# Patient Record
Sex: MALE | Race: WHITE
[De-identification: names, ages, dates, MRNs, and addresses within clinical notes are randomized per-mention and may not be internally consistent; named-entity substitution may affect disease eponyms.]

---

## 2017-12-21 NOTE — RAD
INDICATION: Cough     



COMPARISON: November 10, 2017

 

TECHNIQUE: PA and lateral dual-energy views were obtained.



FINDINGS: 



Bones/Soft Tissues: There are no acute bony findings. There is sternotomy



Cardiomediastinal: The cardiomediastinal silhouette is normal. 



Lungs: There are no infiltrates. There is mild right basilar atelectasis, unchanged



Pleura: There are no pleural effusions. 



Other: There is elevation right hemidiaphragm, unchanged



IMPRESSION:  NO ACTIVE DISEASE. CHRONIC ELEVATION OF THE RIGHT HEMIDIAPHRAGM

## 2017-12-21 NOTE — UC
Lauren BROCK Alfonso, scribed for Vitaliy Aviles MD on 12/21/17 at 1915 .





 General HPI





- HPI Summary


HPI Summary: 


 This patient is an 84 year old M presenting to [facility] referred by Dr. Montanez (

internist) accompanied by daughter with a chief complaint of productive cough 

since a few days ago. He will see Dr. Montanez tomorrow. The patient rates the 

pain 1/10 in severity. Symptoms aggravated and alleviated by nothing. Patient 

reports mild SOB. Patient denies fever, and CP.





- History of Current Complaint


Chief Complaint: UCRespiratory


Stated Complaint: COUGH


Time Seen by Provider: 12/21/17 19:06


Hx Obtained From: Patient


Onset/Duration: Gradual Onset, Lasting Days, Still Present


Timing: Constant


Current Severity: Mild


Pain Intensity: 1 - /10


Aggravating: nothing


Alleviating: nothing


Associated Signs & Symptoms: Positive: Other - mild SOB. Patient denies fever, 

and CP.





- Allergy/Home Medications


Allergies/Adverse Reactions: 


 Allergies











Allergy/AdvReac Type Severity Reaction Status Date / Time


 


No Known Allergies Allergy   Verified 05/07/16 15:28














PMH/Surg Hx/FS Hx/Imm Hx


Previously Healthy: No


Cardiovascular History: Other


Other Cardiovascular History: valvular disease





- Surgical History


Surgical History: Yes


Surgery Procedure, Year, and Place: FEB.6 2014 - AROTIC VALVE REPLACEMENT ( 

DAUGHTER WILL BRING AT TIME OF APT).  DIONNE KNEE REPLACEMENT





- Family History


Known Family History: Positive: Other - REVIEWED & NONCONTRIBUTORY





- Social History


Alcohol Use: None


Substance Use Type: None


Smoking Status (MU): Former Smoker


Type: Pipe


When Did the Patient Quit Smoking/Using Tobacco: 1985





Review of Systems


Constitutional: Other - Negative fever


Respiratory: Shortness Of Breath, Cough


Cardiovascular: Other - Negative CP


All Other Systems Reviewed And Are Negative: Yes





Physical Exam


Triage Information Reviewed: Yes


Vital Signs: 


 Initial Vital Signs











Temp  97.0 F   12/21/17 18:25


 


Pulse  70   12/21/17 18:25


 


Resp  20   12/21/17 18:25


 


BP  136/73   12/21/17 18:25


 


Pulse Ox  96   12/21/17 18:25











Vital Signs Reviewed: Yes





- Additional Comments





VITAL SIGNS: Reviewed.


GENERAL:  Patient is an elderly and nourished male who is lying comfortable in 

the stretcher.  Patient is not in any acute respiratory distress.


HEAD AND FACE: Normocephalic


EYES: PERRLA, EOMI x 2.


EARS: Hearing grossly intact.


MOUTH: Oropharynx within normal limits.


NECK: Supple, trachea is midline, no adenopathy, no JVD, no carotid bruit.


CHEST: Symmetric, no tenderness at palpation


LUNGS: Bilateral crackles. No wheezing.


CVS: Regular rate and rhythm, S1 and S2 present, no murmurs or gallops 

appreciated.


ABDOMEN: Soft, non-tender. Bowel sounds are normal. No abdominal abnormal 

pulsations.


EXTREMITIES: Full ROM in all major joints, no edema, no cyanosis or clubbing.


NEURO: Alert and oriented x 3. No acute neurological deficits. Speech is normal 

and follows commands.


SKIN: Dry and warm








Diagnostics





- Laboratory


Diagnostic Studies Completed/Ordered: CXR reveals, per radiologist, NO ACTIVE 

DISEASE. CHRONIC ELEVATION OF THE RIGHT HEMIDIAPHRAGM. [facility] physician has 

reviewed this radiology report.





Course/Dx





- Course


Course Of Treatment: This patient is an 84 year old M presenting to [facility] 

referred by Dr. Montanez (internist) accompanied by daughter with a chief 

complaint of productive cough since a few days ago. He will see Dr. Montanez 

tomorrow. The patient rates the pain 1/10 in severity. Symptoms aggravated and 

alleviated by nothing. Patient reports mild SOB. Patient denies fever, and CP. 

CXR reveals, per radiologist, NO ACTIVE DISEASE. CHRONIC ELEVATION OF THE RIGHT 

HEMIDIAPHRAGM. [facility] physician has reviewed this radiology report. Influenza A 

and B negative. Patient will be discharged with follow up from PCP. The patient 

is agreeable with this plan. The patient is hemodynamically stable, alert and 

oriented x3.





- Differential Dx - Multi-Symptom


Differential Diagnoses: Other - Pneumonia, Bronchitis, CHF,


Provider Diagnoses: Cough





Discharge





- Discharge Plan


Condition: Stable


Disposition: HOME


Patient Education Materials:  Acute Cough (ED)


Referrals: 


Vitaliy Montanez MD [Primary Care Provider] - 1 Day


Additional Instructions: 


RETURN TO THE EMERGENCY DEPARTMENT OR CONVENIENT CARE FOR CHANGING OR WORSENING 

SYMPTOMS.





The documentation as recorded by the Lauren mcdaniels Alfonso accurately reflects 

the service I personally performed and the decisions made by Stefan macedo Walter, MD.

## 2018-07-18 NOTE — ED
Psychiatric Complaint





- HPI Summary


HPI Summary: 





Pt with dementia who resides on dementia unit at [facility] presents w/ "

aggressive behavior". No other reports provided. Pt is poor historian.





- History Of Current Complaint


Chief Complaint: EDGeneral


Time Seen by Provider: 07/18/18 09:16


Hx Obtained From: Patient





- Allergies/Home Medications


Allergies/Adverse Reactions: 


 Allergies











Allergy/AdvReac Type Severity Reaction Status Date / Time


 


No Known Allergies Allergy   Verified 07/18/18 09:02











Home Medications: 


 Home Medications





Acetaminophen TAB* [Tylenol TAB*] 650 mg PO Q4H PRN 07/18/18 [History Confirmed 

07/18/18]


Aspirin EC TAB* [Ecotrin EC Low Dose 81 MG*] 81 mg PO DAILY 07/18/18 [History 

Confirmed 07/18/18]


Calcium Carbonate TAB* 1,250 mg PO BID 07/18/18 [History Confirmed 07/18/18]


Cholecalciferol TAB* [Vitamin D TAB*] 1,000 unit PO DAILY 07/18/18 [History 

Confirmed 07/18/18]


Donepezil TAB* [Aricept 5 MG TAB*] 10 mg PO BEDTIME 07/18/18 [History Confirmed 

07/18/18]


Enalapril TAB* [Vasotec TAB*] 10 mg PO DAILY 07/18/18 [History Confirmed 07/18/ 18]


Loperamide LIQ* [Imodium LIQ*] 2 mg PO DAILY PRN 07/18/18 [History Confirmed 07/ 18/18]


Metoprolol Tartrate TAB* [Lopressor TAB*] 50 mg PO BID 07/18/18 [History 

Confirmed 07/18/18]


Omega-3 Fatty Acids (Nf) [Fish Oil (NF)] 1,000 mg PO DAILY 07/18/18 [History 

Confirmed 07/18/18]


Simvastatin TAB(NF) [Zocor(NF)] 20 mg PO DAILY 07/18/18 [History Confirmed 07/18 /18]


Umeclidin/Vilant 62.5 MDI(NF) [ANORO 62.5/25 Ellipta DEVICE (NF)] 1 inh INH 

DAILY 07/18/18 [History Confirmed 07/18/18]


amLODIPine TAB* [Norvasc 5 mg TAB*] 5 mg PO DAILY 07/18/18 [History Confirmed 07 /18/18]











PMH/Surg Hx/FS Hx/Imm Hx


Previously Healthy: No - severe dementia


Endocrine/Hematology History: 


   Denies: Hx Diabetes


Cardiovascular History: Reports: Hx Hypertension - CONTROLLED WITH MEDS, Hx 

Peripheral Vascular Disease - RIGHT ILLIAC, Hx Rheumatic Fever - AS A TEEN, Hx 

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


   Denies: Hx Congestive Heart Failure, Hx Pacemaker/ICD, Other Cardiovascular 

Problems/Disorders


Respiratory History: 


   Denies: Hx Asthma, Hx Chronic Obstructive Pulmonary Disease (COPD), Other 

Respiratory Problems/Disorders


GI History: 


   Denies: Other GI Disorders


 History: 


   Denies: Hx Renal Disease


Musculoskeletal History: Reports: Hx Arthritis - HANDS, FEET


   Denies: Other Musculoskeletal History


Sensory History: Reports: Hx Cataracts - DIONNE, Hx Contacts or Glasses - GLASSES, 

Hx Hearing Aid


Opthamlomology History: Reports: Hx Cataracts - DIONNE, Hx Contacts or Glasses - 

GLASSES


Neurological History: Reports: Hx Nerve Disease - NEUROPATHY LEFT SIDE, Other 

Neuro Impairments/Disorders - DEMENTIA


Psychiatric History: 


   Denies: Hx Panic Disorder





- Cancer History


Cancer Type, Location and Year: PROSTATE


Hx Chemotherapy: Yes





- Surgical History


Surgery Procedure, Year, and Place: FEB.6 2014 - AROTIC VALVE REPLACEMENT ( 

DAUGHTER WILL BRING AT TIME OF APT).  DIONNE KNEE REPLACEMENT


Hx Anesthesia Reactions: Yes - AGGRESSIVE WITH VALVE REPLACEMENT


Infectious Disease History: Unable to Obtain/Confirm


Infectious Disease History: 


   Denies: Traveled Outside the [place] in Last 30 Days





- Family History


Known Family History: Positive: Other - level 5 caveat - dementia





- Social History


Occupation: Retired


Lives: At The Nursing Home


Alcohol Use: None


Hx Substance Use: No


Substance Use Type: Reports: None


Hx Tobacco Use: No


Smoking Status (MU): Former Smoker


Type: Pipe





Review of Systems





- ROS Summary


Review of Systems Summary: 





Level 5 caveat - significant dementia


Psychological: Other - aggressive behavior reported by nursing home staff


All Other Systems Reviewed And Are Negative: Yes





Physical Exam


Triage Information Reviewed: Yes


Vital Signs On Initial Exam: 


 Initial Vitals











Temp Pulse Resp BP Pulse Ox


 


 98 F   57   16   145/60   94 


 


 07/18/18 08:59  07/18/18 08:59  07/18/18 08:59  07/18/18 08:59  07/18/18 08:59











Vital Signs Reviewed: Yes


Appearance: Positive: Well-Appearing, No Pain Distress, Well-Nourished


Skin: Positive: Warm, Skin Color Reflects Adequate Perfusion, Dry - no s/sx of 

wounds/skin infection


Head/Face: Positive: Normal Head/Face Inspection - atraumatic


Eyes: Positive: Normal, EOMI, SILVIO, Conjunctiva Clear


ENT: Positive: Normal ENT inspection, Hearing grossly normal, Pharynx normal - 

mucosa moist - atraumatic, TMs normal.  Negative: Nasal congestion, Nasal 

drainage, Trismus, Muffled voice


Dental: Negative: Dental Fracture @


Neck: Positive: Supple, Nontender


Respiratory/Lung Sounds: Positive: Clear to Auscultation, Breath Sounds 

Present.  Negative: Rales, Rhonchi, Wheezes


Cardiovascular: Positive: RRR, S1, S2.  Negative: Leg Edema Left, Leg Edema 

Right


Abdomen Description: Positive: Nontender, No Organomegaly, Soft


Bowel Sounds: Positive: Present


Musculoskeletal: Positive: Strength/ROM Intact - spontaneous and appropriate


Neurological: Positive: Other - significant dementia - does not answer 

questions appropriately.  Negative: Alert, Oriented to Person Place, Time


Psychiatric: Positive: Normal - calm, seated on stretcher - pleasant and mostly 

cooperative w/ exam





Diagnostics





- Vital Signs


 Vital Signs











  Temp Pulse Resp BP Pulse Ox


 


 07/18/18 08:59  98 F  57  16  145/60  94














- Laboratory


Lab Results: 


 Lab Results











  07/18/18 07/18/18 Range/Units





  09:27 09:27 


 


WBC  7.1   (3.5-10.8)  10^3/ul


 


RBC  4.43   (4.00-5.40)  10^6/ul


 


Hgb  13.7 L   (14.0-18.0)  g/dl


 


Hct  40 L   (42-52)  %


 


MCV  91   (80-94)  fL


 


MCH  31   (27-31)  pg


 


MCHC  34   (31-36)  g/dl


 


RDW  15   (10.5-15)  %


 


Plt Count  174   (150-450)  10^3/ul


 


MPV  8.7   (7.4-10.4)  um3


 


Neut % (Auto)  65.4   (38-83)  %


 


Lymph % (Auto)  15.8 L   (25-47)  %


 


Mono % (Auto)  12.3 H   (0-7)  %


 


Eos % (Auto)  5.6   (0-6)  %


 


Baso % (Auto)  0.9   (0-2)  %


 


Absolute Neuts (auto)  4.6   (1.5-7.7)  10^3/ul


 


Absolute Lymphs (auto)  1.1   (1.0-4.8)  10^3/ul


 


Absolute Monos (auto)  0.9 H   (0-0.8)  10^3/ul


 


Absolute Eos (auto)  0.4   (0-0.6)  10^3/ul


 


Absolute Basos (auto)  0.1   (0-0.2)  10^3/ul


 


Absolute Nucleated RBC  0   10^3/ul


 


Nucleated RBC %  0   


 


Sodium   137  (135-145)  mmol/L


 


Potassium   4.1  (3.5-5.0)  mmol/L


 


Chloride   103  (101-111)  mmol/L


 


Carbon Dioxide   30  (22-32)  mmol/L


 


Anion Gap   4  (2-11)  mmol/L


 


BUN   21  (6-24)  mg/dL


 


Creatinine   1.17  (0.67-1.17)  mg/dL


 


Est GFR ( Amer)   71.7  (>60)  


 


Est GFR (Non-Af Amer)   59.2  (>60)  


 


BUN/Creatinine Ratio   17.9  (8-20)  


 


Glucose   107 H  ()  mg/dL


 


Calcium   9.3  (8.6-10.3)  mg/dL


 


Total Bilirubin   0.70  (0.2-1.0)  mg/dL


 


AST   22  (13-39)  U/L


 


ALT   24  (7-52)  U/L


 


Alkaline Phosphatase   68  ()  U/L


 


Total Protein   7.1  (6.4-8.9)  g/dL


 


Albumin   3.5  (3.2-5.2)  g/dL


 


Globulin   3.6  (2-4)  g/dL


 


Albumin/Globulin Ratio   1.0  (1-3)  


 


TSH   1.06  (0.34-5.60)  mcIU/mL


 


Salicylates   < 2.50  (<30)  mg/dL


 


Acetaminophen   < 15  mcg/mL


 


Serum Alcohol   < 10  (<10)  mg/dL











Result Diagrams: 


 07/18/18 09:27





 07/18/18 09:27


Lab Statement: Any lab studies that have been ordered have been reviewed, and 

results considered in the medical decision making process.





Course/Dx





- Course


Course Of Treatment: Pt w/ significant dementia presents from nursing home with 

"aggressive behevior". He is a poor historian and so w/u performed to assess 

for infection/injury. Labs are unremarkable for significant pathology and CXR 

is abnormal for effusion vs. infiltrate. W/o pt reporting sx of cough, SOB and 

having normal pulse ox, temp w/ normal WBC's, a CT was ordered to r/o new 

effusion. CT confirms infiltrate. CT brain was also ordered to r/o trauma - no 

acute findings but chronic atrophy correlates w/ dementia presentation.  

Discussed w/ Dr. Belcher.  Pt d/c'd back to nursing home w/ close f/u w/ PCP 

tomorrow. Danger s/sx of when to return to ED printed on d/c for nursing to 

review - nurse also made call to coordinate care plan (see nursing notes).





- Differential Dx/Clinical Impression


Provider Diagnosis: 


 Pneumonia








Discharge





- Sign-Out/Discharge


Documenting (check all that apply): Patient Departure





- Discharge Plan


Condition: Stable


Disposition: HOME


Prescriptions: 


Azithromycin TAB* [Zithromax TAB (Z-RANJAN) 250 mg #6 tabs] 250 mg PO DAILY #4 tab


ceFUROXime TAB(*) [Ceftin  MG(*)] 500 mg PO BID #9 tab


Patient Education Materials:  Bacterial Pneumonia (ED)


Referrals: 


Vitaliy Montanez MD [Primary Care Provider] - 


Additional Instructions: 


You appear to have a right sided pneumonia - complete medications as directed 

and follow-up with PCP in 2 days.


Take probiotics in between antibiotic doses to prevent diarrhea, c. diff


If you develop fever, chills, vomiting, shortness of breath or chest pain, 

return to the ED





- Billing Disposition and Condition


Condition: STABLE


Disposition: Home

## 2018-12-02 ENCOUNTER — HOSPITAL ENCOUNTER (EMERGENCY)
Dept: HOSPITAL 25 - ED | Age: 83
Discharge: HOME | End: 2018-12-02
Payer: MEDICARE

## 2018-12-02 VITALS — SYSTOLIC BLOOD PRESSURE: 108 MMHG | DIASTOLIC BLOOD PRESSURE: 56 MMHG

## 2018-12-02 DIAGNOSIS — Z87.891: ICD-10-CM

## 2018-12-02 DIAGNOSIS — I10: ICD-10-CM

## 2018-12-02 DIAGNOSIS — R55: ICD-10-CM

## 2018-12-02 DIAGNOSIS — Z96.653: ICD-10-CM

## 2018-12-02 DIAGNOSIS — Z95.2: ICD-10-CM

## 2018-12-02 DIAGNOSIS — R00.1: Primary | ICD-10-CM

## 2018-12-02 LAB
BASOPHILS # BLD AUTO: 0.1 10^3/UL (ref 0–0.2)
EOSINOPHIL # BLD AUTO: 0.2 10^3/UL (ref 0–0.6)
HCT VFR BLD AUTO: 38 % (ref 42–52)
HGB BLD-MCNC: 12.4 G/DL (ref 14–18)
INR PPP/BLD: 0.96 (ref 0.77–1.02)
LYMPHOCYTES # BLD AUTO: 1 10^3/UL (ref 1–4.8)
MCH RBC QN AUTO: 31 PG (ref 27–31)
MCHC RBC AUTO-ENTMCNC: 33 G/DL (ref 31–36)
MCV RBC AUTO: 93 FL (ref 80–94)
MONOCYTES # BLD AUTO: 1 10^3/UL (ref 0–0.8)
NEUTROPHILS # BLD AUTO: 8 10^3/UL (ref 1.5–7.7)
NRBC # BLD AUTO: 0 10^3/UL
NRBC BLD QL AUTO: 0.1
PLATELET # BLD AUTO: 162 10^3/UL (ref 150–450)
RBC # BLD AUTO: 4.06 10^6/UL (ref 4–5.4)
WBC # BLD AUTO: 10.4 10^3/UL (ref 3.5–10.8)

## 2018-12-02 PROCEDURE — 84443 ASSAY THYROID STIM HORMONE: CPT

## 2018-12-02 PROCEDURE — 99284 EMERGENCY DEPT VISIT MOD MDM: CPT

## 2018-12-02 PROCEDURE — 71046 X-RAY EXAM CHEST 2 VIEWS: CPT

## 2018-12-02 PROCEDURE — 82140 ASSAY OF AMMONIA: CPT

## 2018-12-02 PROCEDURE — 80053 COMPREHEN METABOLIC PANEL: CPT

## 2018-12-02 PROCEDURE — 83735 ASSAY OF MAGNESIUM: CPT

## 2018-12-02 PROCEDURE — 70450 CT HEAD/BRAIN W/O DYE: CPT

## 2018-12-02 PROCEDURE — 93005 ELECTROCARDIOGRAM TRACING: CPT

## 2018-12-02 PROCEDURE — 85610 PROTHROMBIN TIME: CPT

## 2018-12-02 PROCEDURE — 84484 ASSAY OF TROPONIN QUANT: CPT

## 2018-12-02 PROCEDURE — 96360 HYDRATION IV INFUSION INIT: CPT

## 2018-12-02 PROCEDURE — 86140 C-REACTIVE PROTEIN: CPT

## 2018-12-02 PROCEDURE — 83605 ASSAY OF LACTIC ACID: CPT

## 2018-12-02 PROCEDURE — 36415 COLL VENOUS BLD VENIPUNCTURE: CPT

## 2018-12-02 PROCEDURE — 83880 ASSAY OF NATRIURETIC PEPTIDE: CPT

## 2018-12-02 PROCEDURE — 85025 COMPLETE CBC W/AUTO DIFF WBC: CPT

## 2018-12-02 NOTE — ED
Syncope/Near Syncope





- HPI Summary


HPI Summary: 


An 86 y/o male presents brought in by [facility] ambulance presents to [facility] with a 

chief complaint of sycope.The patient is a LEVEL 5 CAVEAT: The history is 

limited due to the patient having dementia. Per daughter, Earl relayed the 

information that he was not responding at lunch time and that he was being sent 

out. The pt does not remember anything. The patient denies SOB. Per daughter 

the pt has a Hx of a heart valve replacement and he does not meet the threshold 

for O2.








- History Of Current Complaint


Chief Complaint: EDSyncope


Time Seen by Provider: 12/02/18 13:36


Hx Obtained From: Patient, Family/Caretaker


Hx From Patient Unobtainable Due To: Dementia


Onset/Duration: Sudden Onset, Lasting Hours, Resolved


Context: Witnessed


Activity At Onset: At Rest


Aggravating Factor(s): Nothing


Alleviating Factor(s): Nothing


Associated Signs And Symptoms: Negative





- Allergies/Home Medications


Allergies/Adverse Reactions: 


 Allergies











Allergy/AdvReac Type Severity Reaction Status Date / Time


 


No Known Allergies Allergy   Verified 07/18/18 09:02














PMH/Surg Hx/FS Hx/Imm Hx


Previously Healthy: No


Endocrine/Hematology History: 


   Denies: Hx Diabetes


Cardiovascular History: Reports: Hx Hypertension - CONTROLLED WITH MEDS, Hx 

Peripheral Vascular Disease - RIGHT ILLIAC, Hx Rheumatic Fever - AS A TEEN, Hx 

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


   Denies: Hx Congestive Heart Failure, Hx Pacemaker/ICD, Other Cardiovascular 

Problems/Disorders


Respiratory History: 


   Denies: Hx Asthma, Hx Chronic Obstructive Pulmonary Disease (COPD), Other 

Respiratory Problems/Disorders


GI History: 


   Denies: Other GI Disorders


 History: 


   Denies: Hx Renal Disease


Musculoskeletal History: Reports: Hx Arthritis - HANDS, FEET


   Denies: Other Musculoskeletal History


Sensory History: Reports: Hx Cataracts - DIONNE, Hx Contacts or Glasses - GLASSES, 

Hx Hearing Aid


Opthamlomology History: Reports: Hx Cataracts - DIONNE, Hx Contacts or Glasses - 

GLASSES


Neurological History: Reports: Hx Nerve Disease - NEUROPATHY LEFT SIDE, Other 

Neuro Impairments/Disorders - DEMENTIA


Psychiatric History: 


   Denies: Hx Panic Disorder





- Cancer History


Cancer Type, Location and Year: PROSTATE


Hx Chemotherapy: Yes





- Surgical History


Surgery Procedure, Year, and Place: FEB.6 2014 - AROTIC VALVE REPLACEMENT ( 

DAUGHTER WILL BRING AT TIME OF APT).  DIONNE KNEE REPLACEMENT


Hx Anesthesia Reactions: Yes - AGGRESSIVE WITH VALVE REPLACEMENT


Infectious Disease History: No


Infectious Disease History: 


   Denies: Traveled Outside the [place] in Last 30 Days





- Family History


Known Family History: Positive: Other - level 5 caveat - dementia





- Social History


Alcohol Use: None


Hx Substance Use: No


Substance Use Type: Reports: None


Hx Tobacco Use: No


Smoking Status (MU): Former Smoker


Type: Pipe





Review of Systems


Negative: Shortness Of Breath


Positive: Syncope


All Other Systems Reviewed And Are Negative: Yes





Physical Exam





- Summary


Physical Exam Summary: 





Appearance: The patient is well-nourished in no acute distress and in no acute 

pain.


 


Skin: The skin is warm and dry and skin color reflects adequate perfusion.





HEENT: The head is normocephalic and atraumatic. The pupils are equal and 

reactive. The conjunctivae are clear and without drainage. Nares are patent and 

without drainage. Mouth reveals moist mucous membranes and the throat is 

without erythema and exudate. The external ears are intact. The ear canals are 

patent and without drainage. The tympanic membranes are intact.


 


Neck: The neck is supple with full range of motion and non-tender. There are no 

carotid bruits. There is no neck vein distension.


 





Respiratory: Chest is non-tender. Lungs are clear to auscultation and breath 

sounds are symmetrical and equal.


 


Cardiovascular: Heart is regular rate and rhythm. There is no murmur or rub 

auscultated. There is no peripheral edema and pulses are symmetrical and equal.


 


Abdomen: The abdomen is soft and non-tender. There are normal bowel sounds 

heard in all four quadrants and there is no organomegaly palpated.


 


Musculoskeletal: There is no back tenderness noted. Extremities are non-tender 

with full range of motion. There is good capillary refill. There is no 

peripheral edema or calf tenderness elicited.


 


Neurological: Patient is alert and oriented to person, place and time. The 

patient has symmetrical motor strength in all four extremities. Cranial nerves 

are grossly intact. Deep tendon reflexes are symmetrical and equal in all four 

extremities.


 


Psychiatric: The patient has an appropriate affect and does not exhibit any 

anxiety or depression.


Triage Information Reviewed: Yes


Vital Signs On Initial Exam: 


 Initial Vitals











Temp Pulse Resp BP Pulse Ox


 


 97.7 F   67   19   113/61   93 


 


 12/02/18 13:12  12/02/18 13:12  12/02/18 13:12  12/02/18 13:12  12/02/18 13:12











Vital Signs Reviewed: Yes


Completion Of Physical Exam Limited Due To: Dementia





Diagnostics





- Vital Signs


 Vital Signs











  Temp Pulse Resp BP Pulse Ox


 


 12/02/18 13:12  97.7 F  67  19  113/61  93














- Laboratory


Result Diagrams: 


 12/02/18 14:20





 12/02/18 14:20


Lab Statement: Any lab studies that have been ordered have been reviewed, and 

results considered in the medical decision making process.





- Radiology


  ** CXR


Radiology Interpretation Completed By: Radiologist


Summary of Radiographic Findings: LOW LUNG VOLUMES, SMALL BIBASILAR INFILTRATES 

SUGGESTIVE OF ATELECTASIS.  ED physician has reviewed this imaging report.





- CT


  ** Brain


CT Interpretation Completed By: Radiologist


Summary of CT Findings: 1. NO EVIDENCE FOR ACUTE INTRACRANIAL ABNORMALITY.  2. 

ATROPHY AND FINDINGS CONSISTENT WITH CHRONIC SMALL VESSEL ISCHEMIC CHANGES.  ED 

physician has reviewed this imaging report.





- EKG


  ** 15:02


Cardiac Rate: Bradycardia - 55 bpm


EKG Rhythm: Sinus Rhythm


Summary of EKG Findings: Normal sinus rhythm, R defect.





Course/Dx


Course Of Treatment: Mr. Denis was sent into the emergency department because 

he had a near syncopal episode and was found to be bradycardic.  The ambulance 

found him to be not very alert and intermittently bradycardic and gave him 

atropine on the way in.  On arrival here he was awake and alert and nontoxic in 

appearance with stable vital signs.  He has dementia and cannot give any 

history.  Labs were obtained and he was kept on the monitor here and he 

remained stable for a period of time.  He may at some point need attention to 

this but his family is in agreement with that we do not need to do anything at 

this time and can send him back to the nursing home.





- Diagnoses


Provider Diagnoses: 


 Bradycardia








Discharge





- Sign-Out/Discharge


Documenting (check all that apply): Patient Departure - DC





- Discharge Plan


Condition: Stable


Disposition: HOME


Patient Education Materials:  Bradycardia (ED)


Referrals: 


Vitaliy Montanez MD [Primary Care Provider] -  (2-3 days)


Additional Instructions: 


Follow up with your PCP in 2-3 days. Return to the ED if you experience any new 

or worsening symptoms.





- Billing Disposition and Condition


Condition: STABLE


Disposition: Home





- Attestation Statements


Document Initiated by Scribe: Yes


Documenting Scribe: Cristofer Masterson


Provider For Whom Scribe is Documenting (Include Credential): Matias Jaramillo MD


Scribe Attestation: 


I, Cristofer Masterson, scribed for Matias Jaramillo MD on 12/02/18 at 2118. 


Scribe Documentation Reviewed: Yes


Provider Attestation: 


The documentation as recorded by the scribe, Cristofer Masterson accurately 

reflects the service I personally performed and the decisions made by me, 

Matias Jaramillo MD


Status of Scribe Document: Viewed

## 2018-12-29 ENCOUNTER — HOSPITAL ENCOUNTER (INPATIENT)
Dept: HOSPITAL 25 - ED | Age: 83
LOS: 18 days | Discharge: NURSING, REHAB OR LONG-TERM CARE | DRG: 884 | End: 2019-01-16
Attending: INTERNAL MEDICINE | Admitting: INTERNAL MEDICINE
Payer: MEDICARE

## 2018-12-29 DIAGNOSIS — I25.10: ICD-10-CM

## 2018-12-29 DIAGNOSIS — Z79.01: ICD-10-CM

## 2018-12-29 DIAGNOSIS — J81.1: ICD-10-CM

## 2018-12-29 DIAGNOSIS — E78.5: ICD-10-CM

## 2018-12-29 DIAGNOSIS — I27.20: ICD-10-CM

## 2018-12-29 DIAGNOSIS — Z79.82: ICD-10-CM

## 2018-12-29 DIAGNOSIS — R79.89: ICD-10-CM

## 2018-12-29 DIAGNOSIS — K58.9: ICD-10-CM

## 2018-12-29 DIAGNOSIS — R50.9: ICD-10-CM

## 2018-12-29 DIAGNOSIS — Z95.2: ICD-10-CM

## 2018-12-29 DIAGNOSIS — Z79.1: ICD-10-CM

## 2018-12-29 DIAGNOSIS — I11.9: ICD-10-CM

## 2018-12-29 DIAGNOSIS — I10: ICD-10-CM

## 2018-12-29 DIAGNOSIS — F03.91: Primary | ICD-10-CM

## 2018-12-29 DIAGNOSIS — M25.531: ICD-10-CM

## 2018-12-29 DIAGNOSIS — R55: ICD-10-CM

## 2018-12-29 DIAGNOSIS — Z95.5: ICD-10-CM

## 2018-12-29 DIAGNOSIS — Z66: ICD-10-CM

## 2018-12-29 DIAGNOSIS — I73.9: ICD-10-CM

## 2018-12-29 DIAGNOSIS — Z79.899: ICD-10-CM

## 2018-12-29 DIAGNOSIS — Z85.46: ICD-10-CM

## 2018-12-29 DIAGNOSIS — F05: ICD-10-CM

## 2018-12-29 DIAGNOSIS — M11.231: ICD-10-CM

## 2018-12-29 LAB
ALBUMIN SERPL BCG-MCNC: 3.1 G/DL (ref 3.2–5.2)
ALBUMIN/GLOB SERPL: 0.9 {RATIO} (ref 1–3)
ALP SERPL-CCNC: 61 U/L (ref 34–104)
ALT SERPL W P-5'-P-CCNC: 24 U/L (ref 7–52)
ANION GAP SERPL CALC-SCNC: 4 MMOL/L (ref 2–11)
APTT PPP: 25.6 SECONDS (ref 26–36.3)
AST SERPL-CCNC: 25 U/L (ref 13–39)
BASOPHILS # BLD AUTO: 0.1 10^3/UL (ref 0–0.2)
BUN SERPL-MCNC: 28 MG/DL (ref 6–24)
BUN/CREAT SERPL: 22.2 (ref 8–20)
CALCIUM SERPL-MCNC: 9 MG/DL (ref 8.6–10.3)
CHLORIDE SERPL-SCNC: 109 MMOL/L (ref 101–111)
CK SERPL-CCNC: 51 U/L (ref 10–223)
EOSINOPHIL # BLD AUTO: 0.2 10^3/UL (ref 0–0.6)
GLOBULIN SER CALC-MCNC: 3.3 G/DL (ref 2–4)
GLUCOSE SERPL-MCNC: 137 MG/DL (ref 70–100)
HCO3 SERPL-SCNC: 28 MMOL/L (ref 22–32)
HCT VFR BLD AUTO: 37 % (ref 42–52)
HGB BLD-MCNC: 11.9 G/DL (ref 14–18)
INR PPP/BLD: 0.98 (ref 0.77–1.02)
LYMPHOCYTES # BLD AUTO: 0.9 10^3/UL (ref 1–4.8)
MAGNESIUM SERPL-MCNC: 1.8 MG/DL (ref 1.9–2.7)
MCH RBC QN AUTO: 30 PG (ref 27–31)
MCHC RBC AUTO-ENTMCNC: 32 G/DL (ref 31–36)
MCV RBC AUTO: 93 FL (ref 80–94)
MONOCYTES # BLD AUTO: 0.7 10^3/UL (ref 0–0.8)
NEUTROPHILS # BLD AUTO: 6.1 10^3/UL (ref 1.5–7.7)
NRBC # BLD AUTO: 0 10^3/UL
NRBC BLD QL AUTO: 0
PLATELET # BLD AUTO: 181 10^3/UL (ref 150–450)
POTASSIUM SERPL-SCNC: 4.3 MMOL/L (ref 3.5–5)
PROT SERPL-MCNC: 6.4 G/DL (ref 6.4–8.9)
RBC # BLD AUTO: 3.91 10^6/UL (ref 4–5.4)
SODIUM SERPL-SCNC: 141 MMOL/L (ref 135–145)
TSH SERPL-ACNC: 1.89 MCIU/ML (ref 0.34–5.6)
WBC # BLD AUTO: 8 10^3/UL (ref 3.5–10.8)

## 2018-12-29 PROCEDURE — 80053 COMPREHEN METABOLIC PANEL: CPT

## 2018-12-29 PROCEDURE — 82553 CREATINE MB FRACTION: CPT

## 2018-12-29 PROCEDURE — 85652 RBC SED RATE AUTOMATED: CPT

## 2018-12-29 PROCEDURE — 85610 PROTHROMBIN TIME: CPT

## 2018-12-29 PROCEDURE — 71275 CT ANGIOGRAPHY CHEST: CPT

## 2018-12-29 PROCEDURE — 83735 ASSAY OF MAGNESIUM: CPT

## 2018-12-29 PROCEDURE — 85025 COMPLETE CBC W/AUTO DIFF WBC: CPT

## 2018-12-29 PROCEDURE — 84550 ASSAY OF BLOOD/URIC ACID: CPT

## 2018-12-29 PROCEDURE — 85379 FIBRIN DEGRADATION QUANT: CPT

## 2018-12-29 PROCEDURE — 80048 BASIC METABOLIC PNL TOTAL CA: CPT

## 2018-12-29 PROCEDURE — 87641 MR-STAPH DNA AMP PROBE: CPT

## 2018-12-29 PROCEDURE — 83880 ASSAY OF NATRIURETIC PEPTIDE: CPT

## 2018-12-29 PROCEDURE — 82550 ASSAY OF CK (CPK): CPT

## 2018-12-29 PROCEDURE — 36415 COLL VENOUS BLD VENIPUNCTURE: CPT

## 2018-12-29 PROCEDURE — 84443 ASSAY THYROID STIM HORMONE: CPT

## 2018-12-29 PROCEDURE — 94640 AIRWAY INHALATION TREATMENT: CPT

## 2018-12-29 PROCEDURE — 70450 CT HEAD/BRAIN W/O DYE: CPT

## 2018-12-29 PROCEDURE — 93306 TTE W/DOPPLER COMPLETE: CPT

## 2018-12-29 PROCEDURE — 71045 X-RAY EXAM CHEST 1 VIEW: CPT

## 2018-12-29 PROCEDURE — 99285 EMERGENCY DEPT VISIT HI MDM: CPT

## 2018-12-29 PROCEDURE — 87040 BLOOD CULTURE FOR BACTERIA: CPT

## 2018-12-29 PROCEDURE — 83605 ASSAY OF LACTIC ACID: CPT

## 2018-12-29 PROCEDURE — 84436 ASSAY OF TOTAL THYROXINE: CPT

## 2018-12-29 PROCEDURE — 93005 ELECTROCARDIOGRAM TRACING: CPT

## 2018-12-29 PROCEDURE — 94762 N-INVAS EAR/PLS OXIMTRY CONT: CPT

## 2018-12-29 PROCEDURE — 85730 THROMBOPLASTIN TIME PARTIAL: CPT

## 2018-12-29 PROCEDURE — 93970 EXTREMITY STUDY: CPT

## 2018-12-29 PROCEDURE — 80061 LIPID PANEL: CPT

## 2018-12-29 PROCEDURE — 81003 URINALYSIS AUTO W/O SCOPE: CPT

## 2018-12-29 PROCEDURE — 86140 C-REACTIVE PROTEIN: CPT

## 2018-12-29 PROCEDURE — C8929 TTE W OR WO FOL WCON,DOPPLER: HCPCS

## 2018-12-29 PROCEDURE — 84484 ASSAY OF TROPONIN QUANT: CPT

## 2018-12-29 NOTE — ED
Altered Mental Status





- HPI Summary


HPI Summary: 


Pt is an 84 y/o male brought in by EMS who presents to the ED c/o AMS. He was 

sent from [facility]. Pt was eating dinner then became unresponsive, 

possible syncope. He is normally walking around and talking. As per EMS, pt was 

mildly responsive, bradycardic, and hypotensive, with his HR in the 40s, BP in 

the 70's. Pt rouses to verbal, tactile and painful stimuli, and is combative. 

He was given a total of 1.5 mg Atropine by EMS, which didnt change his BP or 

mental status for EMS but pt's initial BP in the ED was 103 systolic and his HR 

was in the 60's (sinus rhythm). His daughter was left a message by [place]. Pt 

is a non-hospital DNR as per [facility]. No MOLST form is with pt. Vitals 

while in room: /58, HR 67 bpm, O2 sat 100%. Pt is a level 5 caveat due to 

his dementia and AMS. Spoke to daughter Petra Desai at [phone]. She is 

currently visitng in [place], but lives locally, and states her father has 

dementia and had a similar episode a few weeks ago. Dr. Montanez would like to do 

another sleep test and heart monitor soon. Her father does not have capacity, 

and daughter is his health care proxy and confirms that pt is a DNR DNI, which 

was witnessed by Jessy Trivedi, RN and Herlinda Pettit RN  by phone.








- History Of Current Complaint


Stated Complaint: UNRESPONSIVE


Hx Obtained From: Family/Caretaker - daughter Petra by phone, EMS, Other: - 

[facility]


Hx From Patient Unobtainable Due To: Altered Mental Status


Onset/Duration: Still Present, Suddenly - PTA


Timing: Constant


Severity Initially: Severe


Severity Currently: Mild


Character: Responsiveness - decreased


Aggravating Factor(s): Nothing


Alleviating Factor(s): Nothing


Associated Signs And Symptoms: Positive: Negative





- Allergies/Home Medications


Allergies/Adverse Reactions: 


 Allergies











Allergy/AdvReac Type Severity Reaction Status Date / Time


 


No Known Allergies Allergy   Verified 07/18/18 09:02














PMH/Surg Hx/FS Hx/Imm Hx


Previously Healthy: No


Endocrine/Hematology History: 


   Denies: Hx Diabetes


Cardiovascular History: Reports: Hx Hypertension - CONTROLLED WITH MEDS, Hx 

Peripheral Vascular Disease - RIGHT ILLIAC, Hx Rheumatic Fever - AS A TEEN, Hx 

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 


   Denies: Hx Congestive Heart Failure, Hx Pacemaker/ICD, Other Cardiovascular 

Problems/Disorders


Respiratory History: 


   Denies: Hx Asthma, Hx Chronic Obstructive Pulmonary Disease (COPD), Other 

Respiratory Problems/Disorders


GI History: 


   Denies: Other GI Disorders


 History: 


   Denies: Hx Renal Disease


Musculoskeletal History: Reports: Hx Arthritis - HANDS, FEET


   Denies: Other Musculoskeletal History


Sensory History: Reports: Hx Cataracts - DIONNE, Hx Contacts or Glasses - GLASSES, 

Hx Hearing Aid


Opthamlomology History: Reports: Hx Cataracts - DIONNE, Hx Contacts or Glasses - 

GLASSES


Neurological History: Reports: Hx Nerve Disease - NEUROPATHY LEFT SIDE, Other 

Neuro Impairments/Disorders - DEMENTIA


Psychiatric History: 


   Denies: Hx Panic Disorder





- Cancer History


Cancer Type, Location and Year: PROSTATE


Hx Chemotherapy: Yes





- Surgical History


Surgery Procedure, Year, and Place: FEB.6 2014 - AROTIC VALVE REPLACEMENT ( 

DAUGHTER WILL BRING AT TIME OF APT).  DIONNE KNEE REPLACEMENT


Hx Anesthesia Reactions: Yes - AGGRESSIVE WITH VALVE REPLACEMENT


Infectious Disease History: No


Infectious Disease History: 


   Denies: Traveled Outside the [place] in Last 30 Days





- Family History


Known Family History: Positive: Unknown - level 5 caveat - dementia





- Social History


[facility]


Alcohol Use: None


Hx Substance Use: No


Substance Use Type: Reports: None


Hx Tobacco Use: Yes


Smoking Status (MU): Former Smoker


Type: Pipe





Review of Systems


Positive: Other - syncope at dineer 


Positive: Other - Incontinence


Positive: incontinence - of yellow stool on admission into his brief 


Neurological: Other - AMS


Positive: Syncope


Positive: Other - combative with stimulation 


All Other Systems Reviewed And Are Negative: No





Physical Exam





- Summary


Physical Exam Summary: 


Appearance:chronically ill-appearing, no pain distress, well-nourished, 

combative, quiet for a few seconds then wakes up and swears


Skin: Warm, color reflects adequate perfusion, dry


Head: Normal Head/Face inspection, atraumatic


Eyes: Conjunctiva clear


ENT: Normal inspection


Neck: Supple, no nodes, no JVD


Respiratory: Rales in both bases, diminished breath sounds, no respiratory 

distress, breathing not labored


Cardio: RRR, No murmur, pulses normal, brisk capillary refill, no LE edema, 

midline chest scar 


Abdomen: Soft, nontender, incontinent of yellow stool, wearing Depends


Bowel sounds: Present 


Musculoskeletal: Strength Intact/ROM intact, no calf tenderness, no edema.


Psychological: dementia, combative then calm 


Neuro: Alert, muscle tone normal, no focal deficit, moves all extremities.





Triage Information Reviewed: Yes


Vital Signs On Initial Exam: 


 Initial Vitals











Temp Pulse Resp BP Pulse Ox


 


 96.7 F   67   22   103/58   99 


 


 12/29/18 18:49  12/29/18 18:49  12/29/18 18:49  12/29/18 18:49  12/29/18 18:49











Vital Signs Reviewed: Yes





Diagnostics





- Vital Signs


 Vital Signs











  Temp Pulse Resp BP Pulse Ox


 


 12/29/18 18:49  96.7 F  67  22  103/58  99














- Laboratory


Result Diagrams: 


 12/29/18 19:40





 12/29/18 19:40


Lab Statement: Any lab studies that have been ordered have been reviewed, and 

results considered in the medical decision making process.





- Radiology


  ** CXR


Radiology Interpretation Completed By: ED Physician


Summary of Radiographic Findings: Poor inspiration. No acute disease. Pending 

official radiology report.





- EKG


  ** 18:49


Cardiac Rate: NL - 61 bpm


EKG Rhythm: Sinus Rhythm


EKG Comparison: No Significant Change - Compared to 7/18/18.


Summary of EKG Findings: An EKG at 18:49 reveals nml AV/IV CT, nml QTc, 1st 

degree AVB, V1 with artifact.





Re-Evaluation





- Re-Evaluation


  ** First Eval


Re-Evaluation Time: 19:35


Change: Unchanged


Comment: Pt is lying comfortably getting his blood drawn.





  ** Second Eval


Re-Evaluation Time: 21:00


Change: Unchanged


Comment: /99 P 64. Pt going to CTA.





Altered Mental Statu Course/Dx





- Course


Course Of Treatment: Pt is an 84 y/o male brought in by EMS who presents to the 

ED c/o AMS. He was sent from [facility]. Pt was eating dinner then 

became unresponsive, possible syncope. As per EMS, pt was mildly responsive, 

bradycardic, and hypotensive, with his HR in the 40s, BP in the 70's. Pt rouses 

to verbal, tactile and painful stimuli, and is combative. Vitals while in room: 

/58, HR 67 bpm, O2 sat 100%. Pt is a level 5 caveat due to his dementia 

and AMS. Spoke to daughter who states her father has dementia and had a similar 

episode a few weeks ago. Dr. Montanez would like to do another sleep test and 

heart monitor soon. Her father is not at capacity, and is a DNR DNI. A physical 

exam revealed combative, quiet for a few seconds then wakes up and swears, 

Rales in both bases, diminished breath sounds, no respiratory distress, 

breathing not labored, midline chest surgical scar, incontinent of yellow stool

, wearing Depends. A CXR revealed poor inspiration and no acute disease. An EKG 

revealed 1st degree AVB, V1 with artifact. Bloodwork reviewed. Final dx are 

syncope and arrhythmia. Dr. Peacock accepts pt for admission. CTA chest was 

ordered for Dr. Peacock. MOLST form completed. Pt admitted to Dr. Peacock with CTA 

chest and CT brain pending.





- Diagnoses


Differential Diagnosis/HQI/PQRI: Hypoglycemia, Hypoxia, Intracranial Bleed, 

Medication Reaction, Metabolic Disorder


Provider Diagnoses: 


 Syncope, Arrhythmia








- Provider Notifications


Discussed Care Of Patient With: Addie Peacock


Time Discussed With Above Provider: 20:47


Instructed by Provider To: Admit As Inpatient





Discharge





- Sign-Out/Discharge


Documenting (check all that apply): Patient Departure - Admit





- Discharge Plan


Condition: Stable


Disposition: ADMITTED TO [facility]


Referrals: 


Vitaliy Montanez MD [Primary Care Provider] - 





- Billing Disposition and Condition


Condition: STABLE


Disposition: Admitted to [facility]





- Attestation Statements


Document Initiated by Pattie: Yes


Documenting Scribe: Indiana Franz


Provider For Whom Scribe is Documenting (Include Credential): Fiona Guerra MD


Scribe Attestation: 


Indiana BROCK scribed for Fiona Guerra MD on 12/29/18 at 2105. 


Scribe Documentation Reviewed: Yes


Provider Attestation: 


The documentation as recorded by the Indiana mcdaniels accurately reflects the 

service I personally performed and the decisions made by me, Fiona Guerra MD


Status of Scribe Document: Viewed
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
## 2018-12-29 NOTE — XMS REPORT
Continuity of Care Document (CCD)

 Created on:2018



Patient:Albina Denis

Sex:Male

:1933

External Reference #:[id number]





Demographics







 Address  [address] [place] [address]

 

 Home Phone  [phone]

 

 Email Address  [email or web address]

 

 Preferred Language  en

 

 Marital Status  Not  or 

 

 Congregation Affiliation  Unknown

 

 Race  White

 

 Ethnic Group  Not  or 









Author







 Name  Cyndy Fischer









Support







 Name  Relationship  Address  Phone

 

 Kim Desai  Unavailable  Unavailable  [phone]

 

 Cristofer Desai  Unavailable  Unavailable  [phone]

 

 Eladio Denis  Unavailable  Unavailable  [phone]









Care Team Providers







 Name  Role  Phone

 

 Vitaliy Montanez III, MD  Primary Care Physician  Unavailable









Payers







 Type  Date  Identification Numbers  Payment Provider  Subscriber

 

     Policy Number: [id number]  [facility]  Albina Denis









 PayID: [id number]  [address] 









 [place] [address]









     Policy Number: [id number]  Medicare  Albina Marquezn









 PayID: [id number]  [address]









 [place] [place] [address]









   Effective: 1998  Policy Number: [id number]  Medicare  Albina Marquezn









 Expires: 2015  PayID: [id number]  [address]









 [place] [address]









   Expires: 2015  Policy Number: [id number]  [facility]  Albina Denis









 PayID: [id number]  [address] [address]









 [place] [address]









   Expires: 2017  Policy Number:  [facility]  Albina Denis



     [id number]    









 PayID: [id number]  [address]









 [place] [address]







Advance Directives







 Type  Date  Description  Status  Comment

 

 Other Directive  2014  Health Care Proxy  Current and Verified  







Problems







 Date  Description  Provider  Status

 

 Onset: 10/05/2011  Benign essential hypertension  Vitaliy Montanez M.D.  
Active

 

 Onset: 10/05/2011  Pure hypercholesterolemia  Vitaliy Montanez M.D.  Active

 

 Onset: 10/05/2011  Aortic valve disorder  Vitaliy Montanez M.D.  Active

 

 Onset: 10/05/2011  Osteoarthritis of knee  Vitaliy Montanez M.D.  Active

 

 Onset: 10/05/2011  History of malignant neoplasm of  Vitaliy Montanez M.D.  
Active



   prostate    

 

 Onset: 10/10/2012  Mitral valve disorder  [facility]  Active

 

 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



   non-rheumatic    

 

 Onset: 04/10/2013  Aortic aneurysm  Vitaliy Montanez M.D.  Active

 

 Onset: 2014  Heart murmur  Qutaybeh S. Maghaydah, Active M.D.  

 

 Onset: 05/10/2017  Heart valve replacement  Vitaliy Montanez M.D.  Active

 

 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  Active



   behavioral disturbance    

 

 Onset: 2016  Essential hypertension  Vitaliy Montanez M.D.  Active







Family History







 Date  Family Member(s)  Problem(s)  Comments

 

   Father  Heart Disease  

 

   Father   due to Cancer  ()

 

   Mother   due to MI  () - possible

 

   Children  None  

 

   First Brother  MI  at 60's

 

   First Sister  knee replacement  

 

   Second Sister  Unknown  







Social History







 Type  Date  Description  Comments

 

 Birth Sex    Unknown  

 

 Marital Status      

 

 Occupation    Retired  works part time at



       [employer] in



       [employer], retired



       from bank

 

 Tobacco Use  Start: Unknown End:  Former Cigarette Smoker  



   Unknown    

 

 Smoking Status  Reviewed: 18  Former Cigarette Smoker  

 

 ETOH Use    Occasionally consumes  



     alcohol  

 

 Tobacco Use  Start: Unknown End:  Patient is a former  quit , 15-20



   Unknown  smoker  years. 1 pack per 3



       days, pipe twice a



       day

 

 Recreational Drug Use    Denies Drug Use  

 

 Exercise Type/Frequency    Exercises regularly  

 

 Exercise Type/Frequency    walks regularly.  







Allergies, Adverse Reactions, Alerts







 Description

 

 No Known Drug Allergies







Medications







 Medication  Date  Status  Form  Strength  Qnty  SIG  Indications  Ordering



                 Provider

 

 Melatonin    Active  Capsules  10mg  30cap  1 tab by            s  mouth at    Lisseth,



             bedtime as    M.D.



             needed for    



             insomnia    

 

 Antifungal    Active  Powder  2%  71gm  use on              skin    Lisseth,



             irritation    M.D.



             as needed    

 

 Desitin    Active  Cream  13%  1unit  apply            s  every    Lisseth,



             shift and    M.D.



             as needed    

 

 Amoxicillin  05/23  Active  Tablets  500mg  30tab  take one    Vitaliy E.



           s  by mouth    Lisseth,



             three    M.D.



             times a    



             day until    



             finished    

 

 Donepezil HCL    Active  Tablets  10mg  90tab  take 1  F03.90          s  tablet by    Lisseth,



             mouth once    M.D.



             daily at    



             bedtime    

 

 Memantine HCL    Active  Tablets  10mg  180ta  take one    .



           bs  tab by    Lisseth



             mouth    M.DMartell



             twice a    



             day    

 

 Bengay Greaseless    Active  Cream  10-15%  113gm  apply              twice    Lisseth,



             daily to    M.D.



             affected    



             area    

 

 Anoro Ellipta  11/10  Active  Aerosol  62.5-25mc  3unit  1 puff  R09.02  
Vitaliy Martell



         g/Inh  s  daily    SREE Montanez

 

 Depend Real-Fit    Active  Misc    150un  for use    Vitaliy [employer]  /2017        its  5-6 times    Lisseth



 L/XL Maximum            daily dx:    CLIFFDMartell



 Absorbency            c61,    



             n39.42,    



             f01.51    

 

 Depend Pant Large  10/31  Active  Misc    180un  use 4-6    Vitaliy Martell



           its  times a    Lisseth,



             day    MARISA.SANTIAGO

 

 Hydrochlorothiazi    Active  Tablets  12.5mg  90tab  1 by mouth    Vitalyi RODRÍGUEZ



         s  every day    SREE Montanez

 

 Imodium A-D    Active  Liquid  1mg/7.5ML  4oz  20    Martell



             milliliter    Lisseth,



             s (4mg) by    MARISA.SANTIAGO



             mouth with    



             1st loose    



             stool and    



             10    



             milliliter    



             s (2mg)    



             with    



             subsiquent    



             loose    



             stool as    



             needed    



             verbal ord    

 

 Fish Oil    Active  Capsules  1000mg  90cap  1 by mouth    Vitaliy RODRÍGUEZ



           s  daily dx    SREE Montanez

 

 Tylenol  10/15  Active  Tablets  325mg  100ta  2 tab by    Vitaliy RODRÍGUEZ



   /        bs  mouth    Lisseth,



             every 4    M.D.



             hours as    



             needed    



             pain    

 

 Vitamin D-1000    Active  Tablets  1000Unit  90tab  1 by mouth  M17.9  
Vitaliy RODRÍGUEZ



 Maximum Strength  /        s  every day    Lisseth,



             dx: m17.9    M.DMartell



             osteoarthi    



             tis    

 

 Amlodipine    Active  Tablets  10mg  45tab  1/2 by    Vitaliy RODRÍGUEZ



 Besylate          s  mouth    Lisseth,



             every day    M.DMartell

 

 Metoprolol    Active  Tablets  50mg  180ta  1 by mouth    Vitaliy RODRÍGUEZ



 Tartrate          bs  twice a    Lisseth,



             day dx:    M.D.



             htn    

 

 Simvastatin    Active  Tablets  20mg  90tab  take 1 by  E78.0  Vitaliy E.



           s  mouth    Lisseth,



             every day    M.D.

 

 Aspir-81    Active  Tablets DR  81mg  30tab  1 by mouth  E78.0  Vitaliy E.



   /        s  every day    Lisseth,



             dx:    M.DMartell



             hyperchole    



             sterol    

 

 Calcium 600    Active  Tablets  600mg  60tab  1 by mouth    Vitaliy E.



   /        s  twice a    Lisseth,



             day dx    M.D.



             code:    



             v70.0    



             routine    



             general    



             examinatio    



             n    

 

 Enalapril Maleate    Active  Tablets  10mg  90tab  1 by mouth    Vitaliy E.



   /        s  every day    SREE Montanez

 

                 

 

 Bactrim DS    Hx  Tablets  800-160mg  14tab  1 by mouth    Vitaliy E.



           s  twice a    Lisseth,



   -          day    M.DMartell



   10/17              



   /2018              

 

 Ampicillin    Hx  Capsules  500mg  12cap  take 1 tab    Vitaliy E.



           s  by mouth 4    Lisseth,



   -          times a    M.D.



             day for 3    



   /2018          days    

 

 Amoxicillin    Hx  Tablets  500mg  4tabs  take 4              tablets by    Lisseth,



   -          mouth 1    M.D.



   05/23          hour    



   /2018          before    



             dental    



             procedure    

 

 Namzaric  11/10  Hx  Caps ER  28-10mg  90cap  1 po daily  F03.  Vitaliy E.



       24HR    s      Devin Montanez M.D.



                 

 

 Namenda XR    Hx  Caps ER  28mg  90cap  1 by mouth  F03.90  Vitaliy E.



       24HR    s  every day    Devin Montanez M.D.



   11/10              



   /2017              

 

 Namenda XR  05/10  Hx  Caps ER  7mg  1caps  titration  F03.90  Vitaliy E.



       24HR      pack:    Lisseth



   -          start    M.DMaretll



             daily and    



   /          increase    



             dose as    



             per    



             directions    

 

 Debrox    Hx  Solution  6.5%  30ml  5 to 10    Vitaliy E.



             drops in    Lisseth,



   -          each ear    M.D.



             twice    



             daily; not    



             to exceed    



             beyond 4    



             days. Pt    



             may use    



             own.    

 

 Loperamide HCL    Hx  Liquid  1mg/5ML  200ml  10ml by    Vitaliy E.



             mouth as    Lisseth,



   -          needed for    M.D.



             diarrhea    



                 

 

 Systane    Hx  Solution  0.4-0.3%  60ml  one drop    Vitaliy RODRÍGUEZ



             in left    Lisseth,



   -          eye as    M.D.



   05/10          needed for    



             dry eye    



             syndrome    

 

 Keflex    Hx  Capsules  500mg  10cap  1 by mouth  680.2  Vitaliy E.



           s  twice a    Lisseth,



   -          day    M.D.



                 

 

 Imodium A-D    Hx  Liquid  1mg/ 5 ML  4Oz  20    Vitaliy E.



             milliliter    Lisseth,



   -          s (4mg) by    M.D.



             mouth with    



             1st loose    



             stool and    



             10    



             milliliter    



             s (2mg)    



             with    



             subsiquent    



             loose    



             stool as    



             needed    



             verbal    



             orde    

 

 Imodium A-D    Hx  Chewtabs  2mg  20uni  2 tabs by    Vitaliy E.



           ts  mouth as    Lisseth,



   -          needed for    M.D.



             diarrhea.    



                 

 

 Imodium A-D    Hx  Liquid  1mg/7.5ML  120ml  as needed    Vitaliy E.



             for    Lisseth,



   -          diarrhea    M.D.



                 

 

 Saline Nasal    Hx  Solution  0.65%  3unit  use prn    Vitaliy RODRÍGUEZ



 Spray          s      Lisseth,



   -              M.D.



                 

 

 Fish Oil Double    Hx  Capsules  1200mg  30cap  2 in    Qutaybeh



 Strength  /        s  a.m.and 1    S.



   -          at night    Maghaydah



                 , M.D.



                 

 

 Fish Oil Extra    Hx  Capsules  1200mg  30cap  1 by mouth    Vitaliy EMartell



 Strength          s  every day    Lisseth,



   -          DX code:    .D.



             v70.0    



             General    



             examinatio    



             n    

 

 Fish Oil Ultra    Hx  Capsules  1000mg  30cap  1 by mouth    Vitaliy EMartell



   /        s  daily dx    Devin Montanez          v70.0    .D.



                 

 

 Vitamin D    Hx  Tablets  1000Unit  30tab  1 po qd dx    Vitaliy RODRÍGUEZ



           s  code:    Lisseth



   -          v70.0    M.DMartell



   04/24          Routine    



   /2014          general    



             examinatio    



             n    

 

 Centrum    Hx  Tablets    30tab  take 1 po    Vitaliy Martell



           s  daily, Dx    Lisseth,



   -          code:    M.DMartell



             v70.0    



             General    



             Health    



             Screeing    



             examinatio    



             n    

 

 Donepezil HCL    Hx  Tablets  5mg  60tab  2 by mouth  F03.90  Martell



       Dispers    s  every day    Devin Montanez M.D.



   11/10              



   /2017              

 

 Amiodarone HCL    Hx  Tablets  200mg  100ta  1 po bid            bs  for 21    Ordering



   -          days    Provider



                 

 

 Lasix    Hx  Tablets  40mg  30tab  1 po qd            s  for 10    Ordering



   -          days    Provider



                 

 

 Potassium    Hx  Tablets ER  10Meq    1 po qd x    Other



 Chloride           10 days    Ordering



   -              Provider



                 

 

 Hydrochlorothiazi    Hx  Tablets  25mg  90tab  1 by mouth    iVtaliy RODRÍGUEZ



         s  every day    Devin Montanez          resume    M.D.



   05/12          after    



   /2017          finishing    



             lasix    

 

 Hydrochlorothiazi  04/10  Hx  Tablets  10mg  90tab  1 po qd    Vitaliy RODRÍGUEZ



         s      Devin Montanez M.D.



                 

 

 Anucort-HC  04/10  Hx  Suppository  25mg  10uni  1 tab  V10.46  Vitaliy Martell



           ts  daily per    Devin Montanez          rectum    M.DMartell



   10/10          after any    



   /2013          rectal    



             bleeding    

 

 Norvasc    Hx  Tablets  10mg  90tab  1 PO qd    Romario



   /        s      Devin Hansen M.D.,FACP



                 

 

 Anusol-HC    Hx  Suppository  25mg  25uni  1per    Vitaliy RODRÍGUEZ



           ts  rectum bid    Lisseth



   -          x 1 w,then    M.D.



   10/05          qd x 1 wk    



             then d/c    

 

 Glucosamine    Hx  Capsules  500-400    1 PO bid    Lisseth



   /0000              III,



   -              Vitaliy



   10/05              MD ANNE



                 

 

 Zocor  00/  Hx  Tablets  20mg  90tab  one po qhs    Vitaliy E.



   /0000        s      Devin Montanez M.D.



                 

 

 Norvasc    Hx  Tablets  5mg  90tab  1 PO qd    Pedro,



   /        nette Moe MD



   -              



                 

 

 Hydrochlorothiazi    Hx  Tablets  25mg  90tab  1 po qd    Vitaliy RODRÍGUEZ



 de  /        s      Devin Montanez M.D.



   04/10              



   /2013              

 

 Centrum Silver    Hx  Tablets  1  30tab  take 1 po    Lisseth



   /0000        s  daily, Dx    III,



   -          code:    Vitaliy



             v70.0    MD ANNE



   /          General    



             Health    



             Screeing    



             examinatio    



             n    

 

 Fish Oil    Hx  Capsules  1200/2000  30cap  1 by mouth    Vitaliy E.



 W/Vitamin D  /0000      mg  s  every day    Lisseth,



   -          dx code:    M.SANTIAGO



             v70.0    



             routine    



             general    



             screening/    



             examinatio    



             n    

 

 Vitamin A&D    Hx  Capsules  5000/400        Unknown



   /0000              



   -              



   10/10              



   /2012              

 

 Selenium    Hx  Tablets  200mcg        Unknown



   /0000              



   -              



                 

 

 Magnesium    Hx  Tablets  250mg    1 po qd    Unknown



   /0000              



   -              



                 

 

 Anusol HC    Hx    25mgM  21uni  1    Vitaliy E.



   /0000      Supp  ts  suppositor    Lisseth



   -          y per    M.DMartell



             rectum tid    



   /          max 2    



             weeks    

 

 Norvasc    Hx  Tablets  5mg  180ta  1 po bid    Vitaliy E.



   /0000        bs      Devin Montanez              MCAROL



                 

 

 Vasotec    Hx  Tablets  10mg  90tab  Take 1    Vitaliy E.



   /0000        s  Tablet    Lisseth,



   -          Daily    M.D.



   10/10              



   /2013              

 

 Vitamin D-1000    Hx  Tablets  1000Unit  30tab  1 po qd dx    Unknown



   /0000        s  code:    



   -          V70.0    



             Routine    



             general    



             examinatio    



             n    

 

 Cyclobenzaprine    Hx  Tablets  10mg    one by    Unknown



 HCL  /0000          mouth    



   -          three    



             times           day as    



             needed    



             spasm    

 

 Acetaminophen-Cod    Hx  Tablets  300-30mg    1 tab by    Unknown



 eine #3  /0000          mouth q 6    



   -          hrs prn    



             for pain    



   /2017              







Medications Administered in Office







 Medication  Date  Status  Form  Strength  Qnty  SIG  Indications  Ordering



                 Provider

 

 PPD    Administered  Injection          Vitaliy Montanez M.D.







Immunizations







 CPT Code  Status  Date  Vaccine  Lot #

 

 [id number]  Given  2017  Influenza Virus Vaccine, Quadrivalent, Split,  



       Preservative Free  

 

 [id number]  Given  10/21/2015  Influenza Virus Vaccine, Quadrivalent, Split,  



       Preservative Free  

 

 37337  Given  2015  Pneumococcal Conjugate Vaccine 13 Valent For  [id number]



       Intramuscular Use  

 

   Given  10/08/2014  Flu Vaccine NOS  

 

 87730  Given  10/10/2013  Flu Vaccine Split Virus Preservative Free For  
[id number]



       Indiv 3Yr Older  

 

   Given  10/05/2011  Fluzone Vaccine  

 

 78981  Given  10/05/2010  Influenza Virus 3Yrs & Over  [id number]

 

 15575  Given  2009  Influenza Virus Vaccine, Pandemic Formulation  

 

 93219  Given  2009  Tetanus And Diptheria (Td) For Adult Use  



       Preservative Free  

 

 86074  Given  2008  Influenza Virus 3Yrs & Over  

 

 76296  Given  10/04/2007  Influenza Virus 3Yrs & Over  [id number]

 

 96484  Given  10/03/2007  Influenza Virus 3Yrs & Over  

 

 51801  Given  1998  Pneumonia Vaccine  

 

 20106  Given  1998  Pneumovax (History By Patient)  







Vital Signs







 Date  Vital  Result  Comment

 

 2018  3:08pm  Height  68 inches  5'8" not checked









 Weight  207.00 lb  

 

 Heart Rate  69 /min  

 

 BP Systolic Sitting  120 mmHg  

 

 BP Diastolic Sitting  60 mmHg  

 

 O2 % BldC Oximetry  92 %  

 

 BMI (Body Mass Index)  31.5 kg/m2  









 2018 10:16am  Height  68 inches  5'8" not checked









 Weight  205.38 lb  

 

 Heart Rate  74 /min  

 

 BP Systolic Sitting  124 mmHg  

 

 BP Diastolic Sitting  68 mmHg  

 

 O2 % BldC Oximetry  95 %  

 

 BMI (Body Mass Index)  31.2 kg/m2  









 2018  1:59pm  Weight  209.25 lb  









 Heart Rate  62 /min  

 

 BP Systolic Sitting  122 mmHg  

 

 BP Diastolic Sitting  68 mmHg  

 

 O2 % BldC Oximetry  91 %  









 2018 10:39am  Weight  204.00 lb  









 Heart Rate  61 /min  

 

 BP Systolic Sitting  120 mmHg  

 

 BP Diastolic Sitting  72 mmHg  

 

 Body Temperature  97.1 F  

 

 O2 % BldC Oximetry  95 %  dropped to 91% with walking









 11/10/2017 11:09am  Height  68 inches  5'8"









 Weight  202.00 lb  

 

 Heart Rate  53 /min  

 

 BP Systolic Sitting  116 mmHg  

 

 BP Diastolic Sitting  64 mmHg  

 

 Body Temperature  97.5 F  

 

 O2 % BldC Oximetry  96 %  dropped to 82% with walking

 

 BMI (Body Mass Index)  30.7 kg/m2  









 05/10/2017  1:19pm  Height  68 inches  5'8"









 Weight  198.00 lb  

 

 Heart Rate  56 /min  

 

 BP Systolic Sitting  126 mmHg  

 

 BP Diastolic Sitting  82 mmHg  

 

 Respiratory Rate  14 /min  

 

 O2 % BldC Oximetry  97 %  87 %  after walking 120 feet

 

 BMI (Body Mass Index)  30.1 kg/m2  









 2016  2:13pm  Weight  190.00 lb  









 Heart Rate  62 /min  

 

 BP Systolic Sitting  128 mmHg  

 

 BP Diastolic Sitting  80 mmHg  

 

 Respiratory Rate  14 /min  

 

 O2 % BldC Oximetry  96 %  









 2016 11:49am  Weight  190.00 lb  









 Heart Rate  63 /min  

 

 BP Systolic Sitting  130 mmHg  

 

 BP Diastolic Sitting  73 mmHg  

 

 O2 % BldC Oximetry  93 %  









 2016  2:02pm  Height  68 inches  5'8"









 Weight  195.00 lb  

 

 Heart Rate  55 /min  

 

 BP Systolic Sitting  124 mmHg  

 

 BP Diastolic Sitting  72 mmHg  

 

 Respiratory Rate  14 /min  

 

 Body Temperature  97.7 F  

 

 O2 % BldC Oximetry  95 %  

 

 BMI (Body Mass Index)  29.6 kg/m2  









 2015  3:02pm  Height  68 inches  5'8"









 Weight  185.00 lb  

 

 Heart Rate  58 /min  

 

 BP Systolic Sitting  124 mmHg  

 

 BP Diastolic Sitting  82 mmHg  

 

 O2 % BldC Oximetry  97 %  

 

 BMI (Body Mass Index)  28.1 kg/m2  









 2015  2:45pm  Height  68 inches  5'8"









 Weight  187.00 lb  

 

 Heart Rate  60 /min  

 

 BP Systolic Sitting  144 mmHg  

 

 BP Diastolic Sitting  80 mmHg  

 

 Respiratory Rate  14 /min  

 

 BMI (Body Mass Index)  28.4 kg/m2  









 2014  9:57am  Height  66.75 inches  5'6.75"









 Weight  162.00 lb  

 

 Heart Rate  58 /min  

 

 BP Systolic Sitting  162 mmHg  

 

 BP Diastolic Sitting  68 mmHg  

 

 Body Temperature  96.7 F  

 

 BMI (Body Mass Index)  25.6 kg/m2  









 2014  1:15pm  Weight  166.00 lb  









 Heart Rate  58 /min  

 

 BP Systolic Sitting  100 mmHg  

 

 BP Diastolic Sitting  16 mmHg  

 

 Body Temperature  98.7 F  









 2014  1:56pm  Height  67 inches  5'7"









 Weight  165.00 lb  

 

 Heart Rate  82 /min  

 

 BP Systolic Sitting  144 mmHg  

 

 BP Diastolic Sitting  70 mmHg  

 

 Respiratory Rate  16 /min  

 

 BMI (Body Mass Index)  25.8 kg/m2  









 10/10/2013  9:59am  Height  67 inches  5'7"









 Weight  165.50 lb  

 

 Heart Rate  68 /min  

 

 BP Systolic Sitting  130 mmHg  

 

 BP Diastolic Sitting  64 mmHg  

 

 BMI (Body Mass Index)  25.9 kg/m2  









 04/10/2013  8:44am  Height  67.25 inches  5'7.25"









 Weight  174.00 lb  

 

 Heart Rate  74 /min  

 

 BP Systolic Sitting  114 mmHg  

 

 BP Diastolic Sitting  78 mmHg  

 

 BMI (Body Mass Index)  27.0 kg/m2  









 2012  9:33am  Height  67.25 inches  5'7.25"









 Weight  174.00 lb  

 

 Heart Rate  60 /min  irregular

 

 BP Systolic Sitting  154 mmHg  

 

 BP Diastolic Sitting  76 mmHg  

 

 BMI (Body Mass Index)  27.0 kg/m2  









 10/10/2012  8:54am  Height  67.56 inches  5'7.56"









 Weight  176.00 lb  

 

 Heart Rate  88 /min  

 

 BP Systolic Sitting  152 mmHg  

 

 BP Diastolic Sitting  72 mmHg  

 

 BMI (Body Mass Index)  27.1 kg/m2  









 2012  1:16pm  Height  68.50 inches  5'8.50"









 Weight  180.00 lb  

 

 Heart Rate  58 /min  

 

 BP Systolic Sitting  153 mmHg  

 

 BP Diastolic Sitting  68 mmHg  

 

 BMI (Body Mass Index)  27.0 kg/m2  









 10/05/2011 12:50pm  Height  68.50 inches  5'8.50"









 Weight  181.00 lb  

 

 Heart Rate  68 /min  

 

 BP Systolic Sitting  160 mmHg  

 

 BP Diastolic Sitting  80 mmHg  

 

 BMI (Body Mass Index)  27.1 kg/m2  









 2011 11:10am  Weight  180.00 lb  









 Heart Rate  79 /min  

 

 BP Systolic Sitting  138 mmHg  

 

 BP Diastolic Sitting  78 mmHg  

 

 O2 % BldC Oximetry  94 %  









 10/05/2010 10:07am  Height  67 inches  5'7"









 Weight  187.00 lb  

 

 Heart Rate  82 /min  

 

 BP Systolic Sitting  124 mmHg  

 

 BP Diastolic Sitting  70 mmHg  

 

 BMI (Body Mass Index)  29.3 kg/m2  









 2010  8:54am  Weight  184.00 lb  









 Heart Rate  74 /min  

 

 BP Systolic Sitting  120 mmHg  

 

 BP Diastolic Sitting  70 mmHg  









 2010 11:27am  Weight  186.00 lb  









 Heart Rate  64 /min  

 

 BP Systolic Sitting  130 mmHg  

 

 BP Diastolic Sitting  80 mmHg  









 2009  9:47am  Height  67 inches  5'7"









 Weight  182.00 lb  down 8#

 

 Heart Rate  68 /min  

 

 BP Systolic Sitting  124 mmHg  

 

 BP Diastolic Sitting  68 mmHg  

 

 BMI (Body Mass Index)  28.5 kg/m2  









 2009 11:15am  Height  67 inches  5'7"









 Weight  190.00 lb  

 

 Heart Rate  60 /min  

 

 BP Systolic Sitting  124 mmHg  

 

 BP Diastolic Sitting  70 mmHg  

 

 BMI (Body Mass Index)  29.8 kg/m2  









 2008 11:32am  Height  67 inches  5'7"









 Weight  186.00 lb  

 

 Heart Rate  76 /min  

 

 BP Systolic Sitting  110 mmHg  

 

 BP Diastolic Sitting  70 mmHg  

 

 BMI (Body Mass Index)  29.1 kg/m2  









 2008 12:13pm  Height  67 inches  5'7"









 Weight  190.00 lb  

 

 Heart Rate  76 /min  

 

 BP Systolic Sitting  146 mmHg  

 

 BP Diastolic Sitting  74 mmHg  

 

 BMI (Body Mass Index)  29.8 kg/m2  









 2007  9:37am  Height  67 inches  5'7"









 Weight  191.00 lb  

 

 Heart Rate  76 /min  

 

 BP Systolic Sitting  138 mmHg  

 

 BP Diastolic Sitting  76 mmHg  

 

 BMI (Body Mass Index)  29.9 kg/m2  







Results







 Test  Date  Facility  Test  Result  H/L  Range  Note

 

 Laboratory test  2018  [facility]  Ammonia  40 mcmol/L  N  16-
53  



 finding    [address]          



     [place] [address] [phone]          









 B-Type Natriuretic Peptide BNP  155 pg/mL  High  <=100  









 Laboratory test  2018  [facility]  Magnesium  1.9 mg/dL  N  
1.9-2.7  



 finding    [address]          



     [place] [address] [phone]          









 C Reactive Protein  4.89 mg/L  N  <8.01  

 

 Troponin-I (TnI)  0.01 ng/mL    <0.04  1

 

 TSH (Thyroid Stim Horm)  1.67 mcIU/mL  N  0.34-5.60  









 Inr/Protime  2018  [facility]  Inr  0.96  N  0.77-1.02  



      [address]          



     [place] [address] [phone]          

 

 CBC Auto Diff  2018  [facility]  White Blood  10.4  N  3.5-
10.8  



      [address]  Count  10^3/uL      



     [place] [address] [phone]          









 Red Blood Count  4.06 10^6/uL  N  4.00-5.40  

 

 Hemoglobin  12.4 g/dL  Low  14.0-18.0  

 

 Hematocrit  38 %  Low  42-52  

 

 Mean Corpuscular Volume  93 fL  N  80-94  

 

 Mean Corpuscular Hemoglobin  31 pg  N  27-31  

 

 Mean Corpuscular HGB Conc  33 g/dL  N  31-36  

 

 Red Cell Distribution Width  15 %  N  10.5-15  

 

 Platelet Count  162 10^3/uL  N  150-450  

 

 Mean Platelet Volume  9.2 fL  N  7.4-10.4  

 

 Abs Neutrophils  8.0 10^3/uL  High  1.5-7.7  

 

 Abs Lymphocytes  1.0 10^3/uL  N  1.0-4.8  

 

 Abs Monocytes  1.0 10^3/uL  High  0-0.8  

 

 Abs Eosinophils  0.2 10^3/uL  N  0-0.6  

 

 Abs Basophils  0.1 10^3/uL  N  0-0.2  

 

 Abs Nucleated RBC  0 10^3/uL      

 

 Granulocyte %  77.5 %      

 

 Lymphocyte %  9.6 %      

 

 Monocyte %  9.6 %      

 

 Eosinophil %  2.4 %      

 

 Basophil %  0.9 %      

 

 Nucleated Red Blood Cells %  0.1      









 Comp Metabolic Panel  2018  [facility]  Sodium  138 mmol/L  N
  135-145  



     [address]          



     [place] [address] [phone]          









 Potassium  4.3 mmol/L  N  3.5-5.0  

 

 Chloride  108 mmol/L  N  101-111  

 

 Co2 Carbon Dioxide  28 mmol/L  N  22-32  

 

 Anion Gap  2 mmol/L  N  2-11  

 

 Glucose  102 mg/dL  High    

 

 Blood Urea Nitrogen  27 mg/dL  High  6-24  

 

 Creatinine  1.27 mg/dL  High  0.67-1.17  

 

 BUN/Creatinine Ratio  21.3  High  8-20  

 

 Calcium  9.0 mg/dL  N  8.6-10.3  

 

 Total Protein  6.5 g/dL  N  6.4-8.9  

 

 Albumin  3.4 g/dL  N  3.2-5.2  

 

 Globulin  3.1 g/dL  N  2-4  

 

 Albumin/Globulin Ratio  1.1  N  1-3  

 

 Total Bilirubin  0.40 mg/dL  N  0.2-1.0  

 

 Alkaline Phosphatase  67 U/L  N    

 

 Alt  20 U/L  N  7-52  

 

 Ast  22 U/L  N  13-39  

 

 Egfr Non-  53.9    >60  

 

 Egfr   65.2    >60  2









 Laboratory test  2018  [facility]  Lactic Acid  0.9 mmol/L  N
  0.5-2.0  3



 finding    [address]          



     [place] [address]          



     [phone]          

 

 Urine Culture And  2018  [facility]  Urine  SEE RESULT      4
, 5



 Sensitivities    [address]  Culture  BELOW      



     [place] [address] [phone]          

 

 Urinalysis Profile  2018  [facility]  Urine Color  Straw      



     [address]          



     [place] [address] [phone]          









 Urine Appearance  Clear      

 

 Urine Specific Gravity  1.010  N  1.010-1.030  

 

 Urine pH  6.0  N  5-9  

 

 Urine Urobilinogen  Negative    Negative  

 

 Urine Ketones  Negative    Negative  

 

 Urine Protein  Negative    Negative  

 

 Urine Leukocytes  Negative    Negative  

 

 Urine Blood  Negative    Negative  

 

 Urine Nitrite  Negative    Negative  

 

 Urine Bilirubin  Negative    Negative  

 

 Urine Glucose  Negative    Negative  

 

 Urine White Blood Cell  Trace(0-5/hpf)    Absent  

 

 Urine Red Blood Cell  Trace(0-2/hpf)    Absent  

 

 Urine Bacteria  Absent    Absent  

 

 Urine Squamous Epithelial Cell  Present  Abnormal  Absent  









 Laboratory test  2018  [facility]  B-Type  96 pg/mL      6



 finding    [address]  Natriuretic        



     [place] [address]  Peptide BNP        



     [phone]          

 

 CBC Auto Diff  2018  [facility]  White Blood Count  7.1 10^3/
uL  N  3.5-10  



     [address]        .[address]  



     [place] [address] [phone]          









 Red Blood Count  4.43 10^6/uL  N  4.00-5.40  

 

 Hemoglobin  13.7 g/dL  Low  14.0-18.0  

 

 Hematocrit  40 %  Low  42-52  

 

 Mean Corpuscular Volume  91 fL  N  80-94  

 

 Mean Corpuscular Hemoglobin  31 pg  N  27-31  

 

 Mean Corpuscular HGB Conc  34 g/dL  N  31-36  

 

 Red Cell Distribution Width  15 %  N  10.5-15  

 

 Platelet Count  174 10^3/uL  N  150-450  

 

 Mean Platelet Volume  8.7 um3  N  7.4-10.4  

 

 Abs Neutrophils  4.6 10^3/uL  N  1.5-7.7  

 

 Abs Lymphocytes  1.1 10^3/uL  N  1.0-4.8  

 

 Abs Monocytes  0.9 10^3/uL  High  0-0.8  

 

 Abs Eosinophils  0.4 10^3/uL  N  0-0.6  

 

 Abs Basophils  0.1 10^3/uL  N  0-0.2  

 

 Abs Nucleated RBC  0 10^3/uL      

 

 Granulocyte %  65.4 %  N  38-83  

 

 Lymphocyte %  15.8 %  Low  25-47  

 

 Monocyte %  12.3 %  High  0-7  

 

 Eosinophil %  5.6 %  N  0-6  

 

 Basophil %  0.9 %  N  0-2  

 

 Nucleated Red Blood Cells %  0      









 Comp Metabolic Panel  2018  [facility]  Sodium  137 mmol/L  N
  135-145  



     [address]          



     [place] [address] [phone]          









 Potassium  4.1 mmol/L  N  3.5-5.0  

 

 Chloride  103 mmol/L  N  101-111  

 

 Co2 Carbon Dioxide  30 mmol/L  N  22-32  

 

 Anion Gap  4 mmol/L  N  2-11  

 

 Glucose  107 mg/dL  High    

 

 Blood Urea Nitrogen  21 mg/dL  N  6-24  

 

 Creatinine  1.17 mg/dL  N  0.67-1.17  

 

 BUN/Creatinine Ratio  17.9  N  8-20  

 

 Calcium  9.3 mg/dL  N  8.6-10.3  

 

 Total Protein  7.1 g/dL  N  6.4-8.9  

 

 Albumin  3.5 g/dL  N  3.2-5.2  

 

 Globulin  3.6 g/dL  N  2-4  

 

 Albumin/Globulin Ratio  1.0  N  1-3  

 

 Total Bilirubin  0.70 mg/dL  N  0.2-1.0  

 

 Alkaline Phosphatase  68 U/L  N    

 

 Alt  24 U/L  N  7-52  

 

 Ast  22 U/L  N  13-39  

 

 Egfr Non-  59.2    >60  

 

 Egfr   71.7    >60  7









 Laboratory test  2018  [facility]  Acetaminophen  < 15 g/mL
      8



 finding    [address]          



     [place] [address] [phone]          









 Alcohol  < 10 mg/dL  N  <10  

 

 Salicylate  < 2.50 mg/dL    <30  

 

 TSH (Thyroid Stim Horm)  1.06 mcIU/mL  N  0.34-5.60  









 Urine Drug  2018  [facility]  Amphetamine Ur  None Detected  
  None Detect  



 [facility] ED &    [address]  Screen        



 [place] [address] [phone]          









 Barbiturates Urine Screen  None Detected    None Detect  

 

 Benzodiazepine Urine Screen  None Detected    None Detect  

 

 Urine Cannabinoids Screen  None Detected    None Detect  

 

 Urine Cocaine Screen  None Detected    None Detect  

 

 Urine Opiates Screen  None Detected    None Detect  

 

 Urine Phencyclidine Screen  None Detected    None Detect  9









 Urinalysis Profile  2018  [facility]  Urine Color  Yellow    
  



     [address]          



     [place] [address] [phone]          









 Urine Appearance  Clear      

 

 Urine Specific Gravity  1.016  N  1.010-1.030  

 

 Urine pH  6.0  N  5-9  

 

 Urine Urobilinogen  Negative    Negative  

 

 Urine Ketones  Negative    Negative  

 

 Urine Protein  Negative    Negative  

 

 Urine Leukocytes  Negative    Negative  

 

 Urine Blood  Negative    Negative  

 

 *  *  Abnormal  Negative  10

 

 Urine Nitrite  Negative    Negative  

 

 Urine Bilirubin  Negative    Negative  

 

 Urine Glucose  Negative    Negative  









 Urine Culture And  2018  [facility]  Urine Culture  SEE 
RESULT      11, 12



 Sensitivities    [address]    BELOW      



     [place] [address] [phone]          

 

 Urinalysis Profile  2018  [facility]  Urine Color  Yellow    
  13



     [address]          



     [place] [address] [phone]          









 Urine Appearance  Clear      

 

 Urine Specific Gravity  1.019  N  1.010-1.030  

 

 Urine pH  6.0  N  5-9  

 

 Urine Urobilinogen  Negative    Negative  

 

 Urine Ketones  Negative    Negative  

 

 Urine Protein  Negative    Negative  

 

 Urine Leukocytes  Negative    Negative  

 

 Urine Blood  Negative    Negative  

 

 Urine Nitrite  Negative    Negative  

 

 Urine Bilirubin  Negative    Negative  

 

 Urine Glucose  Negative    Negative  









 Urine Culture And  2018  [facility]  Urine Culture  SEE 
RESULT      14



 Sensitivities    [address]    BELOW      



     [place] [address] [phone]          

 

 Lipid Profile  2018  [facility]  Triglycerides  110 mg/dL    
  15, 16



 (Trig/Chol/HDL)    [address]          



     [place] [address] [phone]          









 Cholesterol  158 mg/dL      17

 

 HDL Cholesterol  47.1 mg/dL      18

 

 LDL Cholesterol  89 mg/dL      19









 Comp Metabolic Panel  2018  [facility]  Sodium  139 mmol/L  N
  139-145  



      [address]          



     [place] [address] [phone]          









 Potassium  4.4 mmol/L  N  3.5-5.0  

 

 Chloride  103 mmol/L  N  101-111  

 

 Co2 Carbon Dioxide  28 mmol/L  N  22-32  

 

 Anion Gap  8 mmol/L  N  2-11  

 

 Glucose  94 mg/dL  N    

 

 Blood Urea Nitrogen  24 mg/dL  N  6-24  

 

 Creatinine  1.24 mg/dL  High  0.67-1.17  

 

 BUN/Creatinine Ratio  19.4  N  8-20  

 

 Calcium  9.5 mg/dL  N  8.6-10.3  

 

 Total Protein  7.0 g/dL  N  6.4-8.9  

 

 Albumin  3.5 g/dL  N  3.2-5.2  

 

 Globulin  3.5 g/dL  N  2-4  

 

 Albumin/Globulin Ratio  1.0  N  1-3  

 

 Total Bilirubin  0.40 mg/dL  N  0.2-1.0  

 

 Alkaline Phosphatase  67 U/L  N    

 

 Alt  24 U/L  N  7-52  

 

 Ast  25 U/L  N  13-39  

 

 Egfr Non-  55.4    >60  

 

 Egfr   71.3    >60  20









 Rapid Influenza  2017  [facility]  Influenza A  NEGATIVE    
Negative  21



 A & B Molecular    [address]  Molecular        



     [place] [address] [phone]          









 Influenza B Molecular  NEGATIVE    Negative  









 Basic Metabolic  11/10/2017  [facility]  Sodium  131 mmol/L  Low  
133-145  



 Panel    [address] [place] [address] [phone]          









 Potassium  4.1 mmol/L  N  3.5-5.0  

 

 Chloride  95 mmol/L  Low  101-111  

 

 Co2 Carbon Dioxide  31 mmol/L  N  22-32  

 

 Anion Gap  5 mmol/L  N  2-11  

 

 Glucose  92 mg/dL  N    

 

 Blood Urea Nitrogen  24 mg/dL  N  6-24  

 

 Creatinine  1.24 mg/dL  High  0.67-1.17  

 

 BUN/Creatinine Ratio  19.4  N  8-20  

 

 Calcium  9.7 mg/dL  N  8.6-10.3  

 

 Egfr Non-  55.5    >60  

 

 Egfr   71.4    >60  22









 Basic Metabolic  05/10/2017  [facility]  Sodium  128 mmol/L  Low  
133-145  



 Panel    [address]          



     [place] [address] [phone]          









 Potassium  4.5 mmol/L  N  3.5-5.0  

 

 Chloride  94 mmol/L  Low  101-111  

 

 Co2 Carbon Dioxide  29 mmol/L  N  22-32  

 

 Anion Gap  5 mmol/L  N  2-11  

 

 Glucose  102 mg/dL  High    

 

 Blood Urea Nitrogen  23 mg/dL  N  6-24  

 

 Creatinine  1.02 mg/dL  N  0.67-1.17  

 

 BUN/Creatinine Ratio  22.5  High  8-20  

 

 Calcium  9.3 mg/dL  N  8.6-10.3  

 

 Egfr Non-  69.6  N  >60  

 

 Egfr   89.5  N  >60  23









 Lipid Profile  2017  [facility]  Triglycerides  115 mg/dL  N 
   24, 25



 (Trig/Chol/HDL)    [address]          



     [place] [address] [phone]          









 Cholesterol  142 mg/dL  N    26

 

 HDL Cholesterol  44.2 mg/dL  N    27

 

 LDL Cholesterol  75 mg/dL  N    28









 Comp Metabolic Panel  2017  [facility]  Sodium  128 mmol/L  
Low  133-145  



     [address]          



     [place] [address] [phone]          









 Potassium  3.8 mmol/L  N  3.5-5.0  

 

 Chloride  96 mmol/L  Low  101-111  

 

 Co2 Carbon Dioxide  26 mmol/L  N  22-32  

 

 Anion Gap  6 mmol/L  N  2-11  

 

 Glucose  89 mg/dL  N    

 

 Blood Urea Nitrogen  21 mg/dL  N  6-24  

 

 Creatinine  1.07 mg/dL  N  0.67-1.17  

 

 BUN/Creatinine Ratio  19.6  N  8-20  

 

 Calcium  9.2 mg/dL  N  8.6-10.3  

 

 Total Protein  6.8 g/dL  N  6.4-8.9  

 

 Albumin  3.6 g/dL  N  3.2-5.2  

 

 Globulin  3.2 g/dL  N  2-4  

 

 Albumin/Globulin Ratio  1.1  N  1-3  

 

 Total Bilirubin  0.70 mg/dL  N  0.2-1.0  

 

 Alkaline Phosphatase  52 U/L  N    

 

 Alt  14 U/L  N  7-52  

 

 Ast  19 U/L  N  13-39  

 

 Egfr Non-  65.8  N  >60  

 

 Egfr   84.7  N  >60  29









 Urinalysis Profile  2017  [facility]  Urine Color  Yellow  N 
   30



     [address]          



     [place] [address] [phone]          









 Urine Appearance  Clear  N    

 

 Urine Specific Gravity  1.017  N  1.010-1.030  

 

 Urine pH  5.0  N  5-9  

 

 Urine Urobilinogen  Negative  N  Negative  

 

 Urine Ketones  Negative  N  Negative  

 

 Urine Protein  Negative  N  Negative  

 

 Urine Leukocytes  Negative  N  Negative  

 

 Urine Blood  Negative  N  Negative  

 

 Urine Nitrite  Negative  N  Negative  

 

 Urine Bilirubin  Negative  N  Negative  

 

 Urine Glucose  Negative  N  Negative  









 Urine Culture And  2017  [facility]  Urine Culture  SEE 
RESULT      31



 Sensitivities    [address]    BELOW      



     [place] [address] [phone]          

 

 Laboratory test  2016  [facility]  Erythrocyte Sed  35 mm/Hr  
N  0-40  



 finding    [address]  Rate        



     [place] [address] [phone]          









 C Reactive Protein  13.76 mg/L  High  < 5.00  32

 

 Uric Acid  8.3 mg/dL  High  4.4-7.6  









 Lipid Profile  2016  [facility]  Triglycerides  99 mg/dL  N  
  33



 (Trig/Chol/HDL)    [address]          



     [place] [address] [phone]          









 Cholesterol  150 mg/dL  N    34

 

 HDL Cholesterol  50.1 mg/dL  N    35

 

 LDL Cholesterol  80 mg/dL  N    36









 Comp Metabolic Panel  2016  [facility]  Sodium  137 mmol/L  N
  133-145  



     [address]          



     [place] [address] [phone]          









 Potassium  4.0 mmol/L  N  3.5-5.0  

 

 Chloride  103 mmol/L  N  101-111  

 

 Co2 Carbon Dioxide  26 mmol/L  N  22-32  

 

 Anion Gap  8 mmol/L  N  2-11  

 

 Glucose  93 mg/dL  N    

 

 Blood Urea Nitrogen  25 mg/dL  High  6-24  

 

 Creatinine  1.33 mg/dL  High  0.67-1.17  

 

 BUN/Creatinine Ratio  18.8  N  8-20  

 

 Calcium  9.5 mg/dL  N  8.6-10.3  

 

 Total Protein  6.9 g/dL  N  6.4-8.9  

 

 Albumin  3.9 g/dL  N  3.2-5.2  

 

 Globulin  3.0 g/dL  N  2-4  

 

 Albumin/Globulin Ratio  1.3  N  1-3  

 

 Total Bilirubin  0.70 mg/dL  N  0.2-1.0  

 

 Alkaline Phosphatase  54 U/L  N    

 

 Alt  23 U/L  N  7-52  

 

 Ast  26 U/L  N  13-39  

 

 Egfr Non-  51.3  N  >60  

 

 Egfr   66.0  N  >60  37









 Laboratory test  2016  [facility]  PSA Screening  4.854  High
  0-4.000  38



 finding    [address]    ng/mL      



     [place] [address] [phone]          

 

 Lipid Profile  2015  [facility]  Triglycerides  133 mg/dL  N 
   39



 (Trig/Chol/HDL)    [address]          



     [place] [address] [phone]          









 Cholesterol  170 mg/dL  N    40

 

 HDL Cholesterol  51.4 mg/dL  N    41

 

 LDL Cholesterol  92 mg/dL  N    42









 Comp Metabolic Panel  2015  [facility]  Sodium  134 mmol/L  N
  133-145  



     [address]          



     [place] [address] [phone]          









 Potassium  4.3 mmol/L  N  3.5-5.0  

 

 Chloride  103 mmol/L  N  101-111  

 

 Co2 Carbon Dioxide  26 mmol/L  N  22-32  

 

 Anion Gap  5 mmol/L  N  2-11  

 

 Glucose  93 mg/dL  N    

 

 Blood Urea Nitrogen  23 mg/dL  N  6-24  

 

 Creatinine  1.21 mg/dL  High  0.67-1.17  

 

 BUN/Creatinine Ratio  19.0  N  8-20  

 

 Calcium  9.6 mg/dL  N  8.6-10.3  

 

 Total Protein  7.4 g/dL  N  6.4-8.9  

 

 Albumin  4.0 g/dL  N  3.2-5.2  

 

 Globulin  3.4 g/dL  N  2-4  

 

 Albumin/Globulin Ratio  1.2  N  1-3  

 

 Total Bilirubin  0.70 mg/dL  N  0.2-1.0  

 

 Alkaline Phosphatase  61 U/L  N    

 

 Alt  19 U/L  N  7-52  

 

 Ast  26 U/L  N  13-39  

 

 Egfr Non-  57.6  N  >60  

 

 Egfr   74.0  N  >60  43









 Laboratory test  2015  [facility]  PSA Diagnostic  3.484  N  0
-4.000  44



 finding    [address]    ng/mL      



     [place] [address] [phone]          

 

 Creatinine  2014  [facility]  Creatinine  1.14 mg/dL  N  0.67-
1.17  



     [address]          



     [place] [address] [phone]          









 Egfr Non-  61.7  N  >60  

 

 Egfr   79.3  N  >60  45









 CBC No Diff  2014  [facility]  White Blood  6.3 10^3/uL    4.8
-10.8  



     [address]  Count        



     [place] [address] [phone]          









 Red Blood Count  4.52 10^6/uL    4.0-5.4  

 

 Hemoglobin  13.6 g/dL  Low  14.0-18.0  

 

 Hematocrit  42 %    42-52  

 

 Mean Corpuscular Volume  92 fL    80-94  

 

 Mean Corpuscular Hemoglobin  30 pg    27-31  

 

 Mean Corpuscular HGB Conc  33 g/dL    31-36  

 

 Red Cell Distribution Width  13 %    10.5-15  

 

 Platelet Count  231 10^3/uL    150-450  

 

 Mean Platelet Volume  9 um3    7.4-10.4  









 Inr/Protime  2014  [facility]  Inr  0.89    0.85-1.06  



     [address]          



     [place] [address] [phone]          

 

 Basic Metabolic  2014  [facility]  Sodium  138 mmol/L    133-
145  



 Panel    [address]          



     [place] [address] [phone]          









 Potassium  4.1 mmol/L    3.5-5.0  

 

 Chloride  101 mmol/L    101-111  

 

 Co2 Carbon Dioxide  30.0 mmol/L    22-32  

 

 Anion Gap  7.0 mmol/L    2-11  

 

 Glucose  82 mg/dL      

 

 Blood Urea Nitrogen  19 mg/dL    6-24  

 

 Creatinine  1.00 mg/dL    0.50-1.40  

 

 BUN/Creatinine Ratio  19.0    8-20  

 

 Calcium  9.9 mg/dL    8.1-9.9  

 

 Egfr Non-  71.9    >60  

 

 Egfr   92.5    >60  46









 CBC Auto Diff  2013  [facility]  White Blood  5.2 10^3/uL    
4.8-10.8  



     [address]  Count        



     [place] [address] [phone]          









 Red Blood Count  4.29 10^6/uL    4.0-5.4  

 

 Hemoglobin  13.7 g/dL  Low  14.0-18.0  

 

 Hematocrit  40 %  Low  42-52  

 

 Mean Corpuscular Volume  94 fL    80-94  

 

 Mean Corpuscular Hemoglobin  32 pg  High  27-31  

 

 Mean Corpuscular HGB Conc  34 g/dL    31-36  

 

 Red Cell Distribution Width  14 %    10.5-15  

 

 Platelet Count  198 10^3/uL    150-450  

 

 Mean Platelet Volume  9 um3    7.4-10.4  

 

 Abs Neutrophils  3.3 10^3/uL    1.5-7.7  

 

 Abs Lymphocytes  1.2 10^3/uL    1.0-4.8  

 

 Abs Monocytes  0.5 10^3/uL    0-0.8  

 

 Abs Eosinophils  0.1 10^3/uL    0-0.6  

 

 Abs Basophils  0 10^3/uL    0-0.2  

 

 Abs Nucleated RBC  0 10^3/uL      

 

 Granulocyte %  63.4 %    38-83  

 

 Lymphocyte %  22.7 %  Low  25-47  

 

 Monocyte %  10.6 %  High  1-9  

 

 Eosinophil %  2.5 %    0-6  

 

 Basophil %  0.8 %    0-2  

 

 Nucleated Red Blood Cells %  0.1      









 Comp Metabolic Panel  2013  [facility]  Sodium  138 mmol/L    
133-145  



     [address]          



     [place] [address] [phone]          









 Potassium  3.9 mmol/L    3.5-5.0  

 

 Chloride  102 mmol/L    101-111  

 

 Co2 Carbon Dioxide  30.0 mmol/L    22-32  

 

 Anion Gap  6.0 mmol/L    2-11  

 

 Glucose  94 mg/dL      

 

 Blood Urea Nitrogen  17 mg/dL    6-24  

 

 Creatinine  0.90 mg/dL    0.50-1.40  

 

 BUN/Creatinine Ratio  18.9    8-20  

 

 Calcium  9.3 mg/dL    8.1-9.9  

 

 Total Protein  6.7 g/dL    6.2-8.1  

 

 Albumin  3.9 g/dL    3.2-5.2  

 

 Globulin  2.8 g/dL    2-4  

 

 Albumin/Globulin Ratio  1.4    1-3  

 

 Total Bilirubin  0.9 mg/dL    0.4-1.5  

 

 Alkaline Phosphatase  51 U/L      

 

 Alt  4 U/L  Low  14-54  

 

 Ast  30 U/L    12-42  

 

 Egfr Non-  81.2    >60  

 

 Egfr   104.4    >60  47









 Lipid Profile  2013  [facility]  Triglycerides  77 mg/dL    40
-200  



 (Trig/Chol/HDL)    [address]          



     [place] [address] [phone]          









 Cholesterol  147 mg/dL    Less than 200  

 

 HDL Cholesterol  61 mg/dL  High  40-60  48

 

 Cholesterol/HDL Ratio  2.4 Average    1-4.44  

 

 LDL Cholesterol  70.6    Less Than 100  49









 Laboratory test  2013  [facility]  PSA Diagnostic  1.83 ng/mL
    0-4.0  50



 finding    [address]          



     [place] [address] [phone]          

 

 CBC Auto Diff  10/12/2012  [facility]  White Blood  5.6    4.8-10.8
  



     [address]  Count  10^3/uL      



     [place] [address] [phone]          









 Red Blood Count  4.30 10^6/uL    4.0-5.4  

 

 Hemoglobin  13.5 g/dL  Low  14.0-18.0  

 

 Hematocrit  40 %  Low  42-52  

 

 Mean Corpuscular Volume  93 fL    80-94  

 

 Mean Corpuscular Hemoglobin  31 pg    27-31  

 

 Mean Corpuscular HGB Conc  34 g/dL    31-36  

 

 Red Cell Distribution Width  13 %    10.5-15  

 

 Platelet Count  190 10^3/uL    150-450  

 

 Mean Platelet Volume  9 um3    7.4-10.4  

 

 Abs Neutrophils  3.1 10^3/uL    1.5-7.7  

 

 Abs Lymphocytes  1.4 10^3/uL    1.0-4.8  

 

 Abs Monocytes  0.9 10^3/uL  High  0-0.8  

 

 Abs Eosinophils  0.2 10^3/uL    0-0.6  

 

 Abs Basophils  0 10^3/uL    0-0.2  

 

 Abs Nucleated RBC  0 10^3/uL      

 

 Granulocyte %  55.7 %    38-83  

 

 Lymphocyte %  24.9 %  Low  25-47  

 

 Monocyte %  15.4 %  High  1-9  

 

 Eosinophil %  3.4 %    0-6  

 

 Basophil %  0.6 %    0-2  

 

 Nucleated Red Blood Cells %  0.1      









 Comp Metabolic Panel  10/12/2012  [facility]  Sodium  138 mmol/L    
133-145  



     [address] [place] [address] [phone]          









 Potassium  4.0 mmol/L    3.5-5.0  

 

 Chloride  101 mmol/L    101-111  

 

 Co2 Carbon Dioxide  31.0 mmol/L    22-32  

 

 Anion Gap  6.0 mmol/L    2-11  

 

 Glucose  93 mg/dL      

 

 Blood Urea Nitrogen  18 mg/dL    6-24  

 

 Creatinine  1.00 mg/dL    0.50-1.40  

 

 BUN/Creatinine Ratio  18.0    8-20  

 

 Calcium  10.0 mg/dL  High  8.1-9.9  

 

 Total Protein  7.4 GM/DL    6.2-8.1  

 

 Albumin  4.2 GM/DL    3.2-5.2  

 

 Globulin  3.2 GM/DL    2-4  

 

 Albumin/Globulin Ratio  1.3    1-3  

 

 Total Bilirubin  1.0 mg/dL    0.1-1.0  51

 

 Alkaline Phosphatase  60 U/L      

 

 Alt  19 U/L    14-54  

 

 Ast  31 U/L    12-42  

 

 Egfr Non-  72.1    >60  

 

 Egfr   92.7    >60  52









 Lipid Profile  10/12/2012  [facility]  Triglycerides  63 mg/dL    40
-200  



 (Trig/Chol/HDL)    [address] [place], [place] [id number] [phone]          









 Cholesterol  160 mg/dL    Less than 200  53

 

 HDL Cholesterol  72 mg/dL  High  40-60  54

 

 Cholesterol/HDL Ratio  2.2 AVERAGE    1-4.44  

 

 LDL Cholesterol  75.4 mg/dL    Less Than 100  









 Laboratory test  2012  [facility]  PSA,Diagnostic  1.38 NG/ML
    0-4  55



 finding    [address]          



     [place] [address] [phone]          

 

 Laboratory test  2011  [facility]  PSA,Diagnostic  1.16 NG/ML
    0-4  56



 finding    [address]          



     [place] [address] [phone]          

 

 DR [facility]  2011    TSH  0.94    0.34-5.6  



 Panel        MIU/ML    0  

 

 Comp Metabolic  2011    Sodium  141 mmol/L    135-145  



 Panel              









 Potassium  4.0 mmol/L    3.5-5.0  

 

 Chloride  103 mmol/L    101-111  

 

 Co2 (Carbon Dioxide)  31.0 mmol/L    22-32  

 

 Anion Gap  7.0 mmol/L    2-11  57

 

 Glucose  101 mg/dL  High    

 

 BUN  14 mg/dL    6-24  

 

 Creatinine  1.00 mg/dL    0.50-1.40  

 

 One Over Creatinine  1.00      

 

 BUN/Creatinine Ratio  14.0    8-20  

 

 Calcium  9.6 mg/dL    8.1-9.9  

 

 Total Protein  7.0 GM/DL    6.2-8.1  

 

 Albumin  4.1 GM/DL    3.2-5.2  

 

 Globulin  2.9 GM/DL    2-4  

 

 Albumin/Globulin Ratio  1.4    1-3  

 

 Bilirubin Total  0.9 mg/dL    0.4-1.5  58

 

 Alkaline Phosphatase  51 U/L      

 

 Alt (SGPT)  16 U/L  Low  17-63  

 

 Ast (Sgot)  24 U/L    12-42  

 

 eGFR Non-  72.3    > 60  

 

 eGFR   92.9    > 60  59









 Lipid Profile (Trig/Chol/HDL)  2011    Triglyceride  99 mg/dL      









 Cholesterol  141 mg/dL    Less Than 200  60

 

 High Density Lipoprotein  50 mg/dL    40-60  61

 

 Cholesterol/HDL Ratio  2.82 AVERAGE    1-4.97  

 

 Low Density Lipoprotein  71 mg/dL    Less Than 100  62









 CBC Auto Diff  2011    White Blood Count  5.2 CUMM    4.8-10.8  









 Red Cell Count  4.21 CUMM  Low  4.6-6.2  

 

 Hemoglobin  13.4 g/dL  Low  14.0-18.0  

 

 Hematocrit  39 %  Low  42-52  

 

 Mean Corpuscular Volume  92 um3    80-94  

 

 Mean Corpuscular Hemoglob  32 pg  High  27-31  

 

 Mean Corpuscular HGB Cone  35 g/dL    32-36  

 

 Redcell Distribution WDTH  14 %    10.5-15  

 

 Platelet Count  209 CUMM    150-450  

 

 Mean Platelet Volume  9.1 um3    7.4-10.4  

 

 Gran %  60.8 %    38-83  

 

 Lymph %  23.7 %  Low  25-47  

 

 Mononuclear %  10.9 %  High  1-9  

 

 Eosinophil %  4.0 %    0-6  

 

 Basophil %  0.6 %    0-2  

 

 Abs Lymphs  1.2    1.0-4.8  

 

 Abs Mononuclear  0.6    0-0.8  

 

 Absolute Neutrophil Count  3.1    1.5-7.7  

 

 Abs Eosinophils  0.2    0-0.6  

 

 Abs Basophils  0    0-0.2  









 Laboratory test  2011  [facility]  PSA,Diagnostic  0.83 NG/ML
    0-4  63



 finding    [address] [place], [place] [id number] [phone]          

 

 Laboratory test  10/29/2010  [facility]  Release Date  11/01/10    
  64



 finding    [address] [place] [address] [phone]          

 

 RBC Leukoreduced  10/29/2010  [facility]  RBC Leukoreduced  
[id number]      65



     [address]  [facility]        O <SEE      



     [place] [address]    NOTE>      



     [phone]          









 RBC Leukoreduced  [id number]      O <SEE NOTE>      66

 

 Patient Blood Type  O POSITIVE      

 

 Antibody Screen  NEGATIVE      









 Comp Metabolic Panel  2010  [facility]  Sodium  139 mmol/L    
135-145  



     [address] [place] [address] [phone]          









 Potassium  3.8 mmol/L    3.5-5.0  

 

 Chloride  102 mmol/L    101-111  

 

 Co2 (Carbon Dioxide)  29.0 mmol/L    22-32  

 

 Anion Gap  8.0 mmol/L    2-11  67

 

 Glucose  85 mg/dL      68

 

 BUN  18 mg/dL    6-24  

 

 Creatinine  1.00 mg/dL    0.50-1.40  

 

 One Over Creatinine  1.00      

 

 BUN/Creatinine Ratio  18.0    8-20  

 

 Calcium  9.5 mg/dL    8.1-9.9  69

 

 Total Protein  7.2 GM/DL    6.2-8.1  

 

 Albumin  4.0 GM/DL    3.2-5.2  

 

 Globulin  3.2 GM/DL    2-4  

 

 Albumin/Globulin Ratio  1.3    1-3  

 

 Bilirubin Total  0.9 mg/dL    0.4-1.5  70

 

 Alkaline Phosphatase  67 U/L      

 

 Alt (SGPT)  18 U/L    17-63  

 

 Ast (Sgot)  28 U/L    12-42  

 

 eGFR Non-  77.0    > 60  

 

 eGFR   93.2    > 60  71









 DR Montanez's Lab  2010  [facility]  TSH  0.92 MIU/ML    0.34-
5.60  



 Panel    [address]          



     [place] [address] [phone]          

 

 CBC With  2010  [facility]  White Blood  6.1 CUMM    4.8-10.8
  



 Electronic Diff    [address]  Count        



     [place] [address] [phone]          









 Red Cell Count  4.51 CUMM  Low  4.6-6.2  

 

 Hemoglobin  14.3 g/dL    14.0-18.0  

 

 Hematocrit  42 %    42-52  

 

 Mean Corpuscular Volume  93 um3    80-94  

 

 Mean Corpuscular Hemoglob  32 pg  High  27-31  

 

 Mean Corpuscular HGB Cone  34 g/dL    32-36  

 

 Redcell Distribution WDTH  14 %    10.5-15  

 

 Platelet Count  217 CUMM    150-450  

 

 Mean Platelet Volume  8.2 um3    7.4-10.4  

 

 Gran %  66.3 %    38-83  

 

 Lymph %  18.9 %  Low  25-47  

 

 Mononuclear %  11.4 %  High  1-9  

 

 Eosinophil %  2.8 %    0-6  

 

 Basophil %  0.6 %    0-2  

 

 Abs Lymphs  1.2    1.0-4.8  

 

 Abs Mononuclear  0.7    0-0.8  

 

 Absolute Neutrophil Count  4.0    1.5-7.7  

 

 Abs Eosinophils  0.2    0-0.6  

 

 Abs Basophils  0    0-0.2  72









 Lipid Profile  2010  [facility]  Triglyceride  121 mg/dL    40
-200  



 (Trig/Chol/HDL)    [address]          



     [place] [address] [phone]          









 Cholesterol  163 mg/dL    Less Than 200  73

 

 High Density Lipoprotein  52 mg/dL    40-60  74

 

 Cholesterol/HDL Ratio  3.13 AVERAGE    1-4.97  

 

 Low Density Lipoprotein  87 mg/dL    Less Than 100  75









 Laboratory test  2010  [facility]  PSA,Diagnostic  0.57 NG/ML
    0-4  76



 finding    [address]          



     [place] [address] [phone]          

 

 Lipid Profile  2009  [facility]  Triglyceride  73 mg/dL    40-
200  77



 (Trig/Chol/HDL)    [address]          



     [place] [address] [phone]          









 Cholesterol  153 mg/dL    Less Than 200  78

 

 High Density Lipoprotein  52 mg/dL    40-60  79

 

 Cholesterol/HDL Ratio  2.94 AVERAGE    1-4.97  

 

 Low Density Lipoprotein  86 mg/dL    Less Than 100  80









 Comp Metabolic Panel  2009  [facility]  Sodium  138 mmol/L    
135-145  



     [address]          



     [place] [address] [phone]          









 Potassium  4.0 mmol/L    3.5-5.0  

 

 Chloride  106 mmol/L    101-111  

 

 Co2 (Carbon Dioxide)  30.0 mmol/L    22-32  

 

 Anion Gap  2.0 mmol/L    2-11  81

 

 Glucose  102 mg/dL  High    82

 

 BUN  22 mg/dL    6-24  

 

 Creatinine  1.00 mg/dL    0.50-1.40  

 

 One Over Creatinine  1.00      

 

 BUN/Creatinine Ratio  22.0  High  8-20  

 

 Calcium  9.1 mg/dL    8.1-9.9  83

 

 Total Protein  6.2 GM/DL    6.2-8.1  

 

 Albumin  3.5 GM/DL    3.2-5.2  

 

 Globulin  2.7 GM/DL    2-4  

 

 Albumin/Globulin Ratio  1.3    1-3  

 

 Bilirubin Total  0.9 mg/dL    0.4-1.5  84

 

 Alkaline Phosphatase  64 U/L      

 

 Alt (SGPT)  22 U/L    17-63  

 

 Ast (Sgot)  28 U/L    12-42  

 

 eGFR Non-  77.2    > 60  

 

 eGFR   93.4    > 60  85









 CBC With  2009  [facility]  White Blood  6.4 CUMM    4.8-10.8
  



 Electronic Diff    [address]  Count        



     [place] [address] [phone]          









 Red Cell Count  4.12 CUMM  Low  4.6-6.2  

 

 Hemoglobin  12.9 g/dL  Low  14.0-18.0  

 

 Hematocrit  39 %  Low  42-52  

 

 Mean Corpuscular Volume  93 um3    80-94  

 

 Mean Corpuscular Hemoglob  31 pg    27-31  

 

 Mean Corpuscular HGB Cone  33 g/dL    32-36  

 

 Redcell Distribution WDTH  13 %    10.5-15  

 

 Platelet Count  273 CUMM    150-450  

 

 Mean Platelet Volume  8.1 um3    7.4-10.4  

 

 Gran %  69.8 %    38-83  

 

 Lymph %  15.6 %  Low  25-47  

 

 Mononuclear %  10.4 %  High  1-9  

 

 Eosinophil %  3.6 %    0-6  

 

 Basophil %  0.6 %    0-2  

 

 Abs Lymphs  1.0    1.0-4.8  

 

 Abs Mononuclear  0.7    0-0.8  

 

 Absolute Neutrophil Count  4.4    1.5-7.7  

 

 Abs Eosinophils  0.2    0-0.6  

 

 Abs Basophils  0    0-0.2  86









 Laboratory test  2009  [facility]  TSH  0.61 MIU/ML    0.34-
5.60  



 finding    [address] [place] [address] [phone]          

 

 Laboratory test  2009  [facility]  PSA,Diagnost  0.40 NG/ML  
  0-4  87



 finding    [address]  [place] [address] [phone]          

 

 Laboratory test  2008  [facility]  PSA,Diagnost  0.35 NG/ML  
  0-4  88



 finding    [address] [place] [address] [phone]          

 

 Comp Metabolic  2008  [facility]  Sodium  141 mmol/L    135-
145  89



 Panel    [address] [place] [address] [phone]          









 Potassium  3.7 mmol/L    3.5-5.0  

 

 Chloride  107 mmol/L    101-111  

 

 Co2 (Carbon Dioxide)  31.0 mmol/L    22-32  

 

 Anion Gap  3.0 mmol/L    2-11  90

 

 Glucose  92 mg/dL      

 

 BUN  15 mg/dL    6-24  

 

 Creatinine  1.1 mg/dL    0.5-1.4  

 

 One Over Creatinine  0.90      

 

 BUN/Creatinine Ratio  13.6    8-20  

 

 Calcium  9.3 mg/dL    8.1-9.9  91

 

 Total Protein  7.0 GM/DL    6.2-8.1  

 

 Albumin  3.7 GM/DL    3.2-5.2  

 

 Globulin  3.3 GM/DL    2-4  

 

 Albumin/Globulin Ratio  1.1    1-3  

 

 Bilirubin Total  0.8 mg/dL    0.4-1.5  

 

 Alkaline Phosphatase  67 U/L      

 

 Alt (SGPT)  21 U/L    17-63  

 

 Ast (Sgot)  32 U/L    12-42  









 Lipid Profile  2008  [facility]  Triglyceride  88 mg/dL    40-
200  



 (Trig/Chol/HDL)    [address]          



     [place] [address] [phone]          









 Cholesterol  186 mg/dL    Less Than 200  92

 

 High Density Lipoprotein  52 mg/dL    40-60  93

 

 Cholesterol/HDL Ratio  3.58 AVERAGE    1-4.97  

 

 Low Density Lipoprotein  116 mg/dL  High  Less Than 100  94









 Laboratory test  2008  [facility]  TSH  0.44 MIU/ML    0.34-
5.60  



 finding    [address]          



     [place] [address] [phone]          

 

 CBC With  2008  [facility]  White Blood  4.9 CUMM    4.8-10.8
  



 Electronic Diff    [address]  Count        



     [place] [address] [phone]          









 Red Cell Count  4.32 CUMM  Low  4.6-6.2  

 

 Hemoglobin  13.2 g/dL  Low  14.0-18.0  

 

 Hematocrit  39 %  Low  42-52  

 

 Mean Corpuscular Volume  90 um3    80-94  

 

 Mean Corpuscular Hemoglob  31 pg    27-31  

 

 Mean Corpuscular HGB Cone  34 g/dL    32-36  

 

 Redcell Distribution WDTH  14 %    10.5-15  

 

 Platelet Count  240 CUMM    150-450  

 

 Mean Platelet Volume  8.8 um3    7.4-10.4  

 

 Gran %  63.8 %    38-83  

 

 Lymph %  20.2 %    20-45  

 

 Mononuclear %  12.3 %  High  1-9  

 

 Eosinophil %  2.9 %    0-6  

 

 Basophil %  0.8 %    0-2  

 

 Abs Lymphs  1.0    1.0-4.8  

 

 Abs Mononuclear  0.6    0-0.8  

 

 Absolute Neutrophil Count  3.1    1.5-7.7  

 

 Abs Eosinophils  0.1    0-0.6  

 

 Abs Basophils  0    0-0.2  









 Laboratory test  2008  [facility]  PSA Screening  0.24 NG/ML 
   0-4  95



 finding    [address]          



     [place] [address] [phone]          









 1  Troponin-I testing on Plasma Separator Tubes (PST) has a



   known false positive rate of 0.20-0.40%.  All positive



   troponins reflex immediate secondary confirmatory testing.

 

 2  *******Because ethnic data is not always readily available,



   this report includes an eGFR for both -Americans and



   non- Americans.****



   The National Kidney Disease Education Program (NKDEP) does



   not endorse the use of the MDRD equation for patients that



   are not between the ages of 18 and 70, are pregnant, have



   extremes of body size, muscle mass, or nutritional status,



   or are non- or non-.



   According to the National Kidney Foundation, irrespective of



   diagnosis, the stage of the disease is based on the level of



   kidney function:



   Stage Description                      GFR(mL/min/1.73 m(2))



   1     Kidney damage with normal or decreased GFR       90



   2     Kidney damage with mild decrease in GFR          60-89



   3     Moderate decrease in GFR                         30-59



   4     Severe decrease in GFR                           15-29



   5     Kidney failure                       <15 (or dialysis)

 

 3  [facility] Severe Sepsis and Septic Shock Management Bundle Measure



   requires all lactic acids initially measuring >2.0 mmol/L be



   repeated.

 

 4  [id number]

 

 5  SEE RESULT BELOW



   -----------------------------------------------------------------------------
---------------



   Name:  ALBINA DENIS               : 1933    Attend Dr: Vitaliy Montanez III, MD



   Acct:  [id number]  Unit: [id number]  AGE: 85            Location:  [facility]



   Re18                        SEX: M             Status:    REG REF



   -----------------------------------------------------------------------------
---------------



   



   SPEC: [id number]         NATALIO:       [facility] DR: Vitaliy Montanez III, MD



   REQ:  [id number]              RECD:   



   STATUS: COMP             OTHR DR: Earl



   _



   SOURCE: URINE          SPDESC:



   ORDERED:  Urine Culture



   COMMENTS: [id number]



   QUERIES:  Urine Source: Random



   



   -----------------------------------------------------------------------------
---------------



   Procedure                         Result                         Reported   
        Site



   -----------------------------------------------------------------------------
---------------



   Urine Culture  Final                                             18-
1414      ML



   



   Organism 1                     PROTEUS MIRABILIS



   [place] Count                10-25,000 (Moderate) CFU/ML



   



   1. PROTEUS MIRABILIS



   M.I.C.      RX



   --------- ------



   Ampicillin                     <=2         S



   Cefazolin                      8           S



   Cefepime                       <=1         S



   Ceftriaxone                    <=1         S



   Ciprofloxacin                  <=0.25      S



   Gentamicin                     <=1         S



   Levofloxacin                   <=0.12      S



   Meropenem                      0.5         S



   Nitrofurantoin                 128         R



   Tetracycline                   >=16        R



   Pipercillin/Tazobactam         <=4         S



   Trimethoprim/Sulfamethoxazole  <=20        S



   Amoxicillin/Clavulanic Acid    <=2         S



   Aztreonam                      <=1         S



   



   



   Contact the Microbiology Department for any additional



   antibiotic reporting.



   



   -----------------------------------------------------------------------------
---------------



   * ML - Main Lab



   .



   



   ** END OF REPORT **



   



   DEPARTMENT OF PATHOLOGY,  [address] [place] [address]



   Phone # [phone]      Fax #[phone]



   Andrea Jerome M.D. Director     [facility] # [id number]

 

 6  >100 to <200 pg/mL: likely compensated congestive heart



   failure (CHF)



   200 to 400 pg/mL: likely moderate CHF



   >400 pg/mL: likely moderate to severe CHF

 

 7  *******Because ethnic data is not always readily available,



   this report includes an eGFR for both -Americans and



   non- Americans.****



   The National Kidney Disease Education Program (NKDEP) does



   not endorse the use of the MDRD equation for patients that



   are not between the ages of 18 and 70, are pregnant, have



   extremes of body size, muscle mass, or nutritional status,



   or are non- or non-.



   According to the National Kidney Foundation, irrespective of



   diagnosis, the stage of the disease is based on the level of



   kidney function:



   Stage Description                      GFR(mL/min/1.73 m(2))



   1     Kidney damage with normal or decreased GFR       90



   2     Kidney damage with mild decrease in GFR          60-89



   3     Moderate decrease in GFR                         30-59



   4     Severe decrease in GFR                           15-29



   5     Kidney failure                       <15 (or dialysis)

 

 8  Therapeutic concentration: <50 ug/mL



   Toxic concentration: >120 ug/mL

 

 9  The urine specimen was tested at the listed cutoffs:



   Drug class                       test level



   (ng/mL)



   Amphetamines                        500



   Barbiturates                        200



   Benzodiazepine metabolites          200



   Cocaine metabolites                 150



   Cannabinoids                         50



   Opiates                             300



   Pcp                                  25



   



   Specimen was received without chain of custody. Results



   should be used for medical purposes only.

 

 10  *Ascorbic acid is present which may interfere with detection



   of blood.

 

 11  [id number]

 

 12  SEE RESULT BELOW



   -----------------------------------------------------------------------------
---------------



   Name:  ALBINA DENIS               : 1933    Attend Dr: Vitaliy Montanez III, MD



   Acct:  [id number]  Unit: [id number]  AGE: 85            Location:  [facility]



   Re18                        SEX: M             Status:    REG REF



   -----------------------------------------------------------------------------
---------------



   



   SPEC: [id number]         NATALIO:   18-    SUBM DR: Vitaliy Montanez III, MD



   REQ:  [id number]              RECD:   



   STATUS: COMP



   _



   SOURCE: URINE          SPDESC:



   ORDERED:  Urine Culture



   COMMENTS: [id number]



   



   -----------------------------------------------------------------------------
---------------



   Procedure                         Result                         Reported   
        Site



   -----------------------------------------------------------------------------
---------------



   Urine Culture  Final                                             18-
0945      ML



   



   Organism 1                     AEROCOCCUS URINAE



   [place] Count                ,000 (Many) CFU/ML



   Organism 2                     NORMAL IVIS



   [place] Count                1-10,000 (Few) CFU/ML



   



   Aerococcus isolates are too fastidious for routine



   susceptibility studies.  Aerococcus are usually susceptible



   to penicillin, amoxicillin, piperacillin, cefipime, rifampin



   and vancomycin.  Moderate to good activity occurs with the



   quinolones, tetracyclines and erythromycin.  (Meghna's



   Color [place] and Textbook of Diagnostic Microbiology 6th Ed.



   2006, p. 705-6.)



   



   -----------------------------------------------------------------------------
---------------



   * ML - Main Lab



   .



   



   



   



   



   



   



   



   



   



   



   



   



   



   



   ** END OF REPORT **



   



   DEPARTMENT OF PATHOLOGY,  [address] [place] [address]



   Phone # [phone]      Fax #[phone]



   Andrea Jerome M.D. Director     [facility] # [id number]

 

 13  [id number]

 

 14  SEE RESULT BELOW



   -----------------------------------------------------------------------------
---------------



   Name:  ALBINA DENIS               : 1933    Attend Dr: Vitaliy Montanez III, MD



   Acct:  [id number]  Unit: [id number]  AGE: 85            Location:  [facility]



   Re18                        SEX: M             Status:    REG REF



   -----------------------------------------------------------------------------
---------------



   



   SPEC: [id number]         NATALIO:       [facility] DR: Vitaliy Montanez III, MD



   REQ:  [id number]              RECD:   



   STATUS: COMP



   _



   SOURCE: URINE          [facility]:



   ORDERED:  Urine Culture



   COMMENTS: [id number]



   Urine Source: Random



   



   -----------------------------------------------------------------------------
---------------



   Procedure                         Result                         Reported   
        Site



   -----------------------------------------------------------------------------
---------------



   Urine Culture  Final                                             18-
1404      ML



   



   Organism 1                     AEROCOCCUS URINAE



   [place] Count                25-50,000 (Moderate) CFU/ML



   Organism 2                     NORMAL IVIS



   [place] Count                10-25,000 (Moderate) CFU/ML



   



   Aerococcus isolates are too fastidious for routine



   susceptibility studies.  Aerococcus are usually susceptible



   to penicillin, amoxicillin, piperacillin, cefipime, rifampin



   and vancomycin.  Moderate to good activity occurs with the



   quinolones, tetracyclines and erythromycin.  (Meghna's



   Color [place] and Textbook of Diagnostic Microbiology 6th Ed.



   2006, p. 705-6.)



   



   -----------------------------------------------------------------------------
---------------



   * ML - Main Lab



   .



   



   



   



   



   



   



   



   



   



   



   



   



   ** END OF REPORT **



   



   DEPARTMENT OF PATHOLOGY,  [address] [place] [address]



   Phone # [phone]      Fax #[phone]



   Andrea Jerome M.D. Director     [facility] # [id number]

 

 15  [id number]

 

 16  Desirable: <150



   Borderline High: 150-199



   High: 200-499



   Very High: >500

 

 17  Desirable: <200



   Borderline High: 200-239



   High: >239

 

 18  Low: <40



   Desirable: 40-60



   High: >60

 

 19  Desirable: <100



   Near Optimal: 100-129



   Borderline High: 130-159



   High: 160-189



   Very High: >189

 

 20  *******Because ethnic data is not always readily available,



   this report includes an eGFR for both -Americans and



   non- Americans.****



   The National Kidney Disease Education Program (NKDEP) does



   not endorse the use of the MDRD equation for patients that



   are not between the ages of 18 and 70, are pregnant, have



   extremes of body size, muscle mass, or nutritional status,



   or are non- or non-.



   According to the National Kidney Foundation, irrespective of



   diagnosis, the stage of the disease is based on the level of



   kidney function:



   Stage Description                      GFR(mL/min/1.73 m(2))



   1     Kidney damage with normal or decreased GFR       90



   2     Kidney damage with mild decrease in GFR          60-89



   3     Moderate decrease in GFR                         30-59



   4     Severe decrease in GFR                           15-29



   5     Kidney failure                       <15 (or dialysis)

 

 21  : [id number]

 

 22  *******Because ethnic data is not always readily available,



   this report includes an eGFR for both -Americans and



   non- Americans.****



   The National Kidney Disease Education Program (NKDEP) does



   not endorse the use of the MDRD equation for patients that



   are not between the ages of 18 and 70, are pregnant, have



   extremes of body size, muscle mass, or nutritional status,



   or are non- or non-.



   According to the National Kidney Foundation, irrespective of



   diagnosis, the stage of the disease is based on the level of



   kidney function:



   Stage Description                      GFR(mL/min/1.73 m(2))



   1     Kidney damage with normal or decreased GFR       90



   2     Kidney damage with mild decrease in GFR          60-89



   3     Moderate decrease in GFR                         30-59



   4     Severe decrease in GFR                           15-29



   5     Kidney failure                       <15 (or dialysis)

 

 23  *******Because ethnic data is not always readily available,



   this report includes an eGFR for both -Americans and



   non- Americans.****



   The National Kidney Disease Education Program (NKDEP) does



   not endorse the use of the MDRD equation for patients that



   are not between the ages of 18 and 70, are pregnant, have



   extremes of body size, muscle mass, or nutritional status,



   or are non- or non-.



   According to the National Kidney Foundation, irrespective of



   diagnosis, the stage of the disease is based on the level of



   kidney function:



   Stage Description                      GFR(mL/min/1.73 m(2))



   1     Kidney damage with normal or decreased GFR       90



   2     Kidney damage with mild decrease in GFR          60-89



   3     Moderate decrease in GFR                         30-59



   4     Severe decrease in GFR                           15-29



   5     Kidney failure                       <15 (or dialysis)

 

 24  [id number]

 

 25  Desirable <150



   Borderline high 150-199



   High 200-499



   Very High >500

 

 26  Desirable <200



   Borderline high 200-239



   High >239

 

 27  Low <40



   Desirable: 40-60



   High: >60

 

 28  Desirable: <100 mg/dL



   Near Optimal: 100-129 mg/dL



   Borderline High: 130-159 mg/dL



   High: 160-189 mg/dL



   Very High: >189 mg/dL

 

 29  *******Because ethnic data is not always readily available,



   this report includes an eGFR for both -Americans and



   non- Americans.****



   The National Kidney Disease Education Program (NKDEP) does



   not endorse the use of the MDRD equation for patients that



   are not between the ages of 18 and 70, are pregnant, have



   extremes of body size, muscle mass, or nutritional status,



   or are non- or non-.



   According to the National Kidney Foundation, irrespective of



   diagnosis, the stage of the disease is based on the level of



   kidney function:



   Stage Description                      GFR(mL/min/1.73 m(2))



   1     Kidney damage with normal or decreased GFR       90



   2     Kidney damage with mild decrease in GFR          60-89



   3     Moderate decrease in GFR                         30-59



   4     Severe decrease in GFR                           15-29



   5     Kidney failure                       <15 (or dialysis)

 

 30  [id number]

 

 31  SEE RESULT BELOW



   -----------------------------------------------------------------------------
---------------



   Name:  ALBINA DENIS               : 1933    Attend Dr: Vitaliy Montanez III, MD



   Acct:  [id number]  Unit: [id number]  AGE: 84            Location:  [facility]



   Re17                        SEX: M             Status:    REG REF



   -----------------------------------------------------------------------------
---------------



   



   SPEC: [id number]         NATALIO:   17-    [facility] DR: Vitaliy Montanez III, MD



   REQ:  [id number]              RECD:   



   STATUS: COMP



   _



   SOURCE: URINE          SPDESC:



   ORDERED:  Urine Culture



   COMMENTS: [id number]



   



   -----------------------------------------------------------------------------
---------------



   Procedure                         Result                         Reported   
        Site



   -----------------------------------------------------------------------------
---------------



   Urine Culture  Final                                             17-
1329      ML



   No Growth (<1,000 CFU/mL)



   



   -----------------------------------------------------------------------------
---------------



   * ML - MAIN LAB ([facility])



   .



   



   



   



   



   



   



   



   



   



   



   



   



   



   



   



   



   



   



   



   



   



   



   



   



   



   



   ** END OF REPORT **



   



   * ML=Testing performed at Main Lab



   DEPARTMENT OF PATHOLOGY,  [address] [place] [address]



   Phone # [phone]      Fax #[phone]



   Andrea Jerome M.D. Director     [facility] # [id number]

 

 32  Acute inflammation:  >10.00

 

 33  Desirable <150



   Borderline high 150-199



   High 200-499



   Very High >500

 

 34  Desirable <200



   Borderline high 200-239



   High >239

 

 35  Low <40



   Desirable: 40-60



   High: >60

 

 36  Desirable: <100 mg/dL



   Near Optimal: 100-129 mg/dL



   Borderline High: 130-159 mg/dL



   High: 160-189 mg/dL



   Very High: >189 mg/dL

 

 37  *******Because ethnic data is not always readily available,



   this report includes an eGFR for both -Americans and



   non- Americans.****



   The National Kidney Disease Education Program (NKDEP) does



   not endorse the use of the MDRD equation for patients that



   are not between the ages of 18 and 70, are pregnant, have



   extremes of body size, muscle mass, or nutritional status,



   or are non- or non-.



   According to the National Kidney Foundation, irrespective of



   diagnosis, the stage of the disease is based on the level of



   kidney function:



   Stage Description                      GFR(mL/min/1.73 m(2))



   1     Kidney damage with normal or decreased GFR       90



   2     Kidney damage with mild decrease in GFR          60-89



   3     Moderate decrease in GFR                         30-59



   4     Severe decrease in GFR                           15-29



   5     Kidney failure                       <15 (or dialysis)

 

 38  Serum levels of PSA measured using the [employer]jel[employer] Rezzcard



   DXI Hybritech immunoassay should not be interpreted as



   absolute evidence of the presence or absence of disease.



   The PSA value should be used in conjunction with other



   pertinent clinical diagnostic procedures.



   



   The values obtained with different assay methods or kits



   cannot be used interchangeably.

 

 39  Desirable <150



   Borderline high 150-199



   High 200-499



   Very High >500

 

 40  Desirable <200



   Borderline high 200-239



   High >239

 

 41  Low <40



   Desirable: 40-60



   High: >60

 

 42  Desirable <100



   Near Optimal 100-129



   Borderline high 130-159



   High 160-189



   Very High >189

 

 43  *******Because ethnic data is not always readily available,



   this report includes an eGFR for both -Americans and



   non- Americans.****



   The National Kidney Disease Education Program (NKDEP) does



   not endorse the use of the MDRD equation for patients that



   are not between the ages of 18 and 70, are pregnant, have



   extremes of body size, muscle mass, or nutritional status,



   or are non- or non-.



   According to the National Kidney Foundation, irrespective of



   diagnosis, the stage of the disease is based on the level of



   kidney function:



   Stage Description                      GFR(mL/min/1.73 m(2))



   1     Kidney damage with normal or decreased GFR       90



   2     Kidney damage with mild decrease in GFR          60-89



   3     Moderate decrease in GFR                         30-59



   4     Severe decrease in GFR                           15-29



   5     Kidney failure                       <15 (or dialysis)

 

 44  Serum levels of PSA measured using the [employer]



   DXI Hybritech immunoassay should not be interpreted as



   absolute evidence of the presence or absence of disease.



   The PSA value should be used in conjunction with other



   pertinent clinical diagnostic procedures.



   



   The values obtained with different assay methods or kits



   cannot be used interchangeably.

 

 45  *******Because ethnic data is not always readily available,



   this report includes an eGFR for both -Americans and



   non- Americans.****



   The National Kidney Disease Education Program (NKDEP) does



   not endorse the use of the MDRD equation for patients that



   are not between the ages of 18 and 70, are pregnant, have



   extremes of body size, muscle mass, or nutritional status,



   or are non- or non-.



   According to the National Kidney Foundation, irrespective of



   diagnosis, the stage of the disease is based on the level of



   kidney function:



   Stage Description                      GFR(mL/min/1.73 m(2))



   1     Kidney damage with normal or decreased GFR       90



   2     Kidney damage with mild decrease in GFR          60-89



   3     Moderate decrease in GFR                         30-59



   4     Severe decrease in GFR                           15-29



   5     Kidney failure                       <15 (or dialysis)

 

 46  *******Because ethnic data is not always readily available,



   this report includes an eGFR for both -Americans and



   non- Americans.****



   The National Kidney Disease Education Program (NKDEP) does



   not endorse the use of the MDRD equation for patients that



   are not between the ages of 18 and 70, are pregnant, have



   extremes of body size, muscle mass, or nutritional status,



   or are non- or non-.



   According to the National Kidney Foundation, irrespective of



   diagnosis, the stage of the disease is based on the level of



   kidney function:



   Stage Description                      GFR(mL/min/1.73 m(2))



   1     Kidney damage with normal or decreased GFR       90



   2     Kidney damage with mild decrease in GFR          60-89



   3     Moderate decrease in GFR                         30-59



   4     Severe decrease in GFR                           15-29



   5     Kidney failure                       <15 (or dialysis)

 

 47  *******Because ethnic data is not always readily available,



   this report includes an eGFR for both -Americans and



   non- Americans.****



   The National Kidney Disease Education Program (NKDEP) does



   not endorse the use of the MDRD equation for patients that



   are not between the ages of 18 and 70, are pregnant, have



   extremes of body size, muscle mass, or nutritional status,



   or are non- or non-.



   According to the National Kidney Foundation, irrespective of



   diagnosis, the stage of the disease is based on the level of



   kidney function:



   Stage Description                      GFR(mL/min/1.73 m(2))



   1     Kidney damage with normal or decreased GFR       90



   2     Kidney damage with mild decrease in GFR          60-89



   3     Moderate decrease in GFR                         30-59



   4     Severe decrease in GFR                           15-29



   5     Kidney failure                       <15 (or dialysis)

 

 48  HDL Interpretation:



   Undesirable: High Risk:  Less than 40 mg/dL



   Desirable:  Low Risk:  Greater than 60 mg/dL

 

 49  LDL Interpretation:



   Low Risk Optimal Level:  LDL Less than 100 mg/dL



   Near or Above Optimal:  -129 mg/dL



   Borderline High Risk:  -159 mg/dL



   High Risk:  -189 mg/dL



   Very High Risk:  LDL Greater than 189 mg/dL

 

 50  Serum levels of PSA measured using the [employer] Rezzcard



   DXI Hybritech immunoassay should not be interpreted as



   absolute evidence of the presence or absence of disease.



   The PSA value should be used in conjunction with other



   pertinent clinical diagnostic procedures.



   



   The values obtained with different assay methods or kits



   cannot be used interchangeably.

 

 51  A metabolite of Naproxen, O-desmethylnaproxen, has been



   shown to interfere with the [place] method for



   measuring total bilirubin.  Samples from patients who have



   taken Naproxen have shown spurious elevation in total



   bilirubin levels.

 

 52  *******Because ethnic data is not always readily available,



   this report includes an eGFR for both -Americans and



   non- Americans.****



   The National Kidney Disease Education Program (NKDEP) does



   not endorse the use of the MDRD equation for patients that



   are not between the ages of 18 and 70, are pregnant, have



   extremes of body size, muscle mass, or nutritional status,



   or are non- or non-.



   According to the National Kidney Foundation, irrespective of



   diagnosis, the stage of the disease is based on the level of



   kidney function:



   Stage Description                      GFR(mL/min/1.73 m(2))



   1     Kidney damage with normal or decreased GFR       90



   2     Kidney damage with mild decrease in GFR          60-89



   3     Moderate decrease in GFR                         30-59



   4     Severe decrease in GFR                           15-29



   5     Kidney failure                       <15 (or dialysis)

 

 53  Desirable:  Less than 200 MG/DL



   Borderline-High Risk:  200-239 MG/DL



   High-Risk:  240 MG/DL and over

 

 54  HDL Interpretation:



   Undesirable: High Risk:  Less than 40 MG/DL



   Desirable:  Low Risk:  Greater than 60 MG/DL

 

 55  *



   SERUM LEVELS OF PSA MEASURED USING THE [employer]ICA Musicane



   ACCESS HYBRITECH IMMUNOASSAY SHOULD NOT BE INTERPRETED AS



   ABSOLUTE EVIDENCE OF THE PRESENCE OR ABSENCE OF DISEASE.



   THE PSA VALUE SHOULD BE USED IN CONJUNCTION WITH OTHER



   PERTINENT CLINICAL DIAGNOSTIC PROCEDURES.



   



   The values obtained with different assay methods or kits



   cannot be used interchangeably.

 

 56  *



   SERUM LEVELS OF PSA MEASURED USING THE AN[employer]ICA Musicane



   ACCESS HYBRITECH IMMUNOASSAY SHOULD NOT BE INTERPRETED AS



   ABSOLUTE EVIDENCE OF THE PRESENCE OR ABSENCE OF DISEASE.



   THE PSA VALUE SHOULD BE USED IN CONJUNCTION WITH OTHER



   PERTINENT CLINICAL DIAGNOSTIC PROCEDURES.

 

 57  Anion gap measurement may be of limited value in the



   presence of any alkalosis, especially in a combined acid



   base disorder.



   .

 

 58  A metabolite of Naproxen, O-desmethylnaproxen, has been



   shown to interfere with the [place] method for



   measuring total bilirubin.  Samples from patients who have



   taken Naproxen have shown spurious elevation in total



   bilirubin levels.

 

 59  *******Because ethnic data is not always readily available,



   this report includes an eGFR for both -Americans and



   non- Americans.****



   The National Kidney Disease Education Program (NKDEP) does



   not endorse the use of the MDRD equation for patients that



   are not between the ages of 18 and 70, are pregnant, have



   extremes of body size, muscle mass, or nutritional status,



   or are non- or non-.



   According to the National Kidney Foundation, irrespective of



   diagnosis, the stage of the disease is based on the level of



   kidney function:



   Stage Description                      GFR(mL/min/1.73 m(2))



   1     Kidney damage with normal or decreased GFR       90



   2     Kidney damage with mild decrease in GFR          60-89



   3     Moderate decrease in GFR                         30-59



   4     Severe decrease in GFR                           15-29



   5     Kidney failure                       <15 (or dialysis)

 

 60  CHOLESTEROL INTERPRETATION:



   Desirable:  Less than 200 MG/DL



   Borderline-High Risk:  200-239 MG/DL



   High-Risk:  240 MG/DL and over

 

 61  HDL INTERPRETATION:



   Undesirable: High Risk:  Less than 40 MG/DL



   Desirable:  Low Risk:  Greater than 60 MG/DL

 

 62  LDL INTERPRETATION:



   Low Risk Optimal Level:  LDL Less than 100 MG/DL



   Near or Above Optimal:  -129 MG/DL



   Borderline High Risk:  -159 MG/DL



   High Risk:  -189 MG/DL



   Very High Risk:  LDL Greater than 189 MG/DL

 

 63  *



   SERUM LEVELS OF PSA MEASURED USING THE [employer] Music[employer]



   ACCESS HYBRITECH IMMUNOASSAY SHOULD NOT BE INTERPRETED AS



   ABSOLUTE EVIDENCE OF THE PRESENCE OR ABSENCE OF DISEASE.



   THE PSA VALUE SHOULD BE USED IN CONJUNCTION WITH OTHER



   PERTINENT CLINICAL DIAGNOSTIC PROCEDURES.

 

 64  ANY BLOOD NOT GIVEN WILL BE RELEASED AT 0700 ON THE



   ABOVE DATE UNLESS DOCTOR NOTIFIES LAB OTHERWISE.

 

 65  [id number]      ON        PC



   TRANSFUSED     10/29/10 0944

 

 66  [id number]      ON        PC



   TRANSFUSED     10/29/10 1224

 

 67  Anion gap measurement may be of limited value in the



   presence of any alkalosis, especially in a combined acid



   base disorder.



   .

 

 68  ** Note change in reference range as of 08.  The



   change was based on recommendations from the American



   Diabetes Association.

 

 69  Please note change in reference range effective 08



   .

 

 70  A metabolite of Naproxen, O-desmethylnaproxen, has been



   shown to interfere with the Jendrassik-[place] method for



   measuring total bilirubin.  Samples from patients who have



   taken Naproxen have shown spurious elevation in total



   bilirubin levels.

 

 71  *******Because ethnic data is not always readily available,



   this report includes an eGFR for both -Americans and



   non- Americans.****



   The National Kidney Disease Education Program (NKDEP) does



   not endorse the use of the MDRD equation for patients that



   are not between the ages of 18 and 70, are pregnant, have



   extremes of body size, muscle mass, or nutritional status,



   or are non- or non-.



   According to the National Kidney Foundation, irrespective of



   diagnosis, the stage of the disease is based on the level of



   kidney function:



   Stage Description                      GFR(mL/min/1.73 m(2))



   1     Kidney damage with normal or decreased GFR       90



   2     Kidney damage with mild decrease in GFR          60-89



   3     Moderate decrease in GFR                         30-59



   4     Severe decrease in GFR                           15-29



   5     Kidney failure                       <15 (or dialysis)

 

 72  Lymphopenia %

 

 73  CHOLESTEROL INTERPRETATION:



   Desirable:  Less than 200 MG/DL



   Borderline-High Risk:  200-239 MG/DL



   High-Risk:  240 MG/DL and over

 

 74  HDL INTERPRETATION:



   Undesirable: High Risk:  Less than 40 MG/DL



   Desirable:  Low Risk:  Greater than 60 MG/DL

 

 75  LDL INTERPRETATION:



   Low Risk Optimal Level:  LDL Less than 100 MG/DL



   Near or Above Optimal:  -129 MG/DL



   Borderline High Risk:  -159 MG/DL



   High Risk:  -189 MG/DL



   Very High Risk:  LDL Greater than 189 MG/DL

 

 76  *



   SERUM LEVELS OF PSA MEASURED USING THE [employer]



   ACCESS HYBRITECH IMMUNOASSAY SHOULD NOT BE INTERPRETED AS



   ABSOLUTE EVIDENCE OF THE PRESENCE OR ABSENCE OF DISEASE.



   THE PSA VALUE SHOULD BE USED IN CONJUNCTION WITH OTHER



   PERTINENT CLINICAL DIAGNOSTIC PROCEDURES.



   A PSA value in the range of 0.1 to 0.6 ng/ml is



   indeterminate if being used as an indicator of recurrent or



   residual disease.



   .

 

 77  FASTING

 

 78  CHOLESTEROL INTERPRETATION:



   Desirable:  Less than 200 MG/DL



   Borderline-High Risk:  200-239 MG/DL



   High-Risk:  240 MG/DL and over

 

 79  HDL INTERPRETATION:



   Undesirable: High Risk:  Less than 40 MG/DL



   Desirable:  Low Risk:  Greater than 60 MG/DL

 

 80  LDL INTERPRETATION:



   Low Risk Optimal Level:  LDL Less than 100 MG/DL



   Near or Above Optimal:  -129 MG/DL



   Borderline High Risk:  -159 MG/DL



   High Risk:  -189 MG/DL



   Very High Risk:  LDL Greater than 189 MG/DL

 

 81  Anion gap measurement may be of limited value in the



   presence of any alkalosis, especially in a combined acid



   base disorder.



   .

 

 82  ** Note change in reference range as of 08.  The



   change was based on recommendations from the American



   Diabetes Association.

 

 83  Please note change in reference range effective 08



   



   



   .

 

 84  A metabolite of Naproxen, O-desmethylnaproxen, has been



   shown to interfere with the Jendrassik-Checo method for



   measuring total bilirubin.  Samples from patients who have



   taken Naproxen have shown spurious elevation in total



   bilirubin levels.

 

 85  *******Because ethnic data is not always readily available,



   this report includes an eGFR for both -Americans and



   non- Americans.****



   The National Kidney Disease Education Program (NKDEP) does



   not endorse the use of the MDRD equation for patients that



   are not between the ages of 18 and 70, are pregnant, have



   extremes of body size, muscle mass, or nutritional status,



   or are non- or non-.



   According to the National Kidney Foundation, irrespective of



   diagnosis, the stage of the disease is based on the level of



   kidney function:



   Stage Description                      GFR(mL/min/1.73 m(2))



   1     Kidney damage with normal or decreased GFR       90



   2     Kidney damage with mild decrease in GFR          60-89



   3     Moderate decrease in GFR                         30-59



   4     Severe decrease in GFR                           15-29



   5     Kidney failure                       <15 (or dialysis)

 

 86  Lymphopenia %

 

 87  *



   SERUM LEVELS OF PSA MEASURED USING THE [employer]



   ACCESS HYBRITECH IMMUNOASSAY SHOULD NOT BE INTERPRETED AS



   ABSOLUTE EVIDENCE OF THE PRESENCE OR ABSENCE OF DISEASE.



   THE PSA VALUE SHOULD BE USED IN CONJUNCTION WITH OTHER



   PERTINENT CLINICAL DIAGNOSTIC PROCEDURES.



   



   A PSA value in the range of 0.1 to 0.6 ng/ml is



   indeterminate if being used as an indicator of recurrent or



   residual disease.



   .

 

 88  *



   SERUM LEVELS OF PSA MEASURED USING THE ANJELICA CAROL



   ACCESS HYBRITECH IMMUNOASSAY SHOULD NOT BE INTERPRETED AS



   ABSOLUTE EVIDENCE OF THE PRESENCE OR ABSENCE OF DISEASE.



   THE PSA VALUE SHOULD BE USED IN CONJUNCTION WITH OTHER



   PERTINENT CLINICAL DIAGNOSTIC PROCEDURES.



   



   A PSA value in the range of 0.1 to 0.6 ng/ml is



   indeterminate if being used as an indicator of recurrent or



   residual disease.



   .

 

 89  PATIENT MAY HAVE RESULTS PER DOCTOR'S AUTHORIZATION. Questions regarding 
this



   report should be directed to your doctor.

 

 90  Anion gap measurement may be of limited value in the



   presence of any alkalosis, especially in a combined acid



   base disorder.



   .

 

 91  Please note change in reference range effective 08



   



   



   .

 

 92  CHOLESTEROL INTERPRETATION:



   Desirable:  Less than 200 MG/DL



   Borderline-High Risk:  200-239 MG/DL



   High-Risk:  240 MG/DL and over

 

 93  HDL INTERPRETATION:



   Undesirable: High Risk:  Less than 40 MG/DL



   Desirable:  Low Risk:  Greater than 60 MG/DL

 

 94  LDL INTERPRETATION:



   Low Risk Optimal Level:  LDL Less than 100 MG/DL



   Near or Above Optimal:  -129 MG/DL



   Borderline High Risk:  -159 MG/DL



   High Risk:  -189 MG/DL



   Very High Risk:  LDL Greater than 189 MG/DL

 

 95  *



   SERUM LEVELS OF PSA MEASURED USING THE [employer]



   ACCESS HYBRITECH IMMUNOASSAY SHOULD NOT BE INTERPRETED AS



   ABSOLUTE EVIDENCE OF THE PRESENCE OR ABSENCE OF DISEASE.



   THE PSA VALUE SHOULD BE USED IN CONJUNCTION WITH OTHER



   PERTINENT CLINICAL DIAGNOSTIC PROCEDURES.



   



   A PSA value in the range of 0.1 to 0.6 ng/ml is



   indeterminate if being used as an indicator of recurrent or



   residual disease.



   .







Procedures







 Date  Code  Description  Status

 

 2014  25284  ECHO Transthoracic, Real-Time 2D With Doppler And Color  
Completed



     Flow  

 

 2014  27298  EKG Tracing & Interpretation  Completed

 

 2013  97417  Color Flow Doppler/Interp & Reprt  Completed

 

 2013  77461  Pulse Wave/Continuous-Interp.RPT  Completed

 

 2013  87538  ECHO Transthorasic Realtime 2D W Doppler & Color Flow  
Completed



     Hosp  

 

 10/10/2012  98538  ECHO Transthoracic, Real-Time 2D With Doppler And Color  
Completed



     Flow  

 

 11/10/2010  16295  Rad Exam; Knee, Ap&L  Completed

 

 10/26/2010  13534  TKR Total Knee Replacement  Completed

 

 10/26/2010  12562  TKR Total Knee Replacement  Completed

 

 10/05/2010  27943  EKG Tracing & Interpretation  Completed

 

 2010  33644  Xray Knee 3 Views  Completed

 

 2010  66811  Rad Exam; Knee, Ap&L  Completed

 

 2010  29964  ECHO Transthoracic, Real-Time 2D With Doppler And Color  
Completed



     Flow  

 

 2009  77338  EKG Tracing & Interpretation  Completed

 

 2008  89486  Color Doppler  Completed

 

 2008  95549  Color Doppler  Completed

 

 2008  36402  Pulse Doppler & Continuous Wave  Completed

 

 2008  53115  Pulse Doppler & Continuous Wave  Completed

 

 2008  29500  Pulse Doppler & Continuous Wave  Completed

 

 2008  86054  Echocardiogram  Completed

 

 2008  26323  Echocardiogram  Completed

 

 2007  71174  Color Doppler  Completed

 

 2007  74850  Color Doppler  Completed

 

 2007  53832  Pulse Doppler & Continuous Wave  Completed

 

 2007  08641  Echocardiogram  Completed

 

 2007  80209  Echocardiogram  Completed

 

 2007  [id number]  Colonoscopy  Completed







Encounters







 Type  Date  Location  Provider  Dx  Diagnosis

 

 Office Visit  2018  [facility]  Vitaliy RODRÍGUEZ  R60.0  Localized edema



   2:00p  Malu Montanez M.D.    



     [facility]      

 

 Office Visit  2018  Todd RODRÍGUEZ  R09.02  Hypoxemia



   10:20a  Malu Montanez M.D.    



     [facility]      

 

 Office Visit  11/10/2017  Todd RODRÍGUEZ  I10  Essential (primary)



   10:40a  Malu Montanez M.D.    hypertension



     [facility]      









 E78.00  Pure hypercholesterolemia, unspecified

 

 F03.90  Unspecified dementia without behavioral disturbance

 

 R09.02  Hypoxemia









 Office Visit  2016  2:20p  Todd RODRÍGUEZ  M79.672  Pain in 
left foot



     Malu Montanez M.D.    



     [place]      

 

 Office Visit  2016 11:40a  Todd RODRÍGUEZ  M54.2  Cervicalgia



     Malu Montanez M.D.    



     [place]      









 Z85.46  Personal history of malignant neoplasm of prostate









 Office Visit  2015  2:40p  Todd RODRÍGUEZ  680.2  Carbuncle &



     Malu Montanez M.D.    [facility]



     [place]      









 401.1  Hypertension Benign









 Office Visit  2014 10:00a  Todd RODRÍGUEZ  V70.0  Examination



     Malu Montanez M.D.    General Medical



     [place]      Routine [facility]



           Care Facility









 290.0  Dementia Senile Uncomplicated

 

 401.1  Hypertension Benign

 

 424.1  Aortic Valve Disorder

 

 V74.1  Screening Examination Pulmonary Tuberculosis

 

 272.0  Hypercholesterolemia Pure

 

 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









 Office Visit  2014  1:20p  Todd RODRÍGUEZ  424.1  Aortic Valve



     Malu Montanez M.D.    Disorder



     Ck      









 401.1  Hypertension Benign









 Office Visit  2014  [place] Qutaybeh SMartell  401.1  Hypertension



   1:40p  Cardiology  SREE Benito    Benign









 272.0  Hypercholesterolemia Pure

 

 424.1  Aortic Valve Disorder

 

 424.0  Mitral Valve Disorder

 

 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

 

 427.61  Premature Beats Supraventricular

 

 785.2  Murmur Cardiac Undiagnosed









 Office Visit  10/10/2013 10:00a  [facility]  Vitaliy RODRÍGUEZ  401.1  Hypertension 
[facility]



     SREE Petersen      









 272.0  Hypercholesterolemia Pure

 

 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

 

 424.1  Aortic Valve Disorder

 

 V04.81  Need For Prophylactic Vaccination & Inoculation/Influenza









 Office Visit  2012  9:40a  [facility] Internal  Vitaliy RODRÍGUEZ  401.1  Hypertension 
[facility]



     SREE Petersen      









 424.1  Aortic Valve Disorder









 Office Visit  10/10/2012  9:00a  [facility]  Vitaliy RODRÍGUEZ  401.1  Hypertension 
[facility]



     SREE Petersen      









 272.0  Hypercholesterolemia Pure

 

 424.1  Aortic Valve Disorder

 

 715.96  Osteoarthrosis Unspec Genlzd Or Localized Lower Leg

 

 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









 Office Visit  2012  1:00p  [facility] Berenice RODRÍGUEZ  401.1  Hypertension 
SREE Matamoros      









 272.0  Hypercholesterolemia Pure

 

 424.1  Aortic Valve Disorder

 

 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









 Office Visit  10/05/2011  1:00p  DO Not Use Todd RODRÍGUEZ  V70.0  Examination



     AT Mary Montanez M.D.    General Medical



           Routine [facility]



           Care Facility









 401.1  Hypertension Benign

 

 272.0  Hypercholesterolemia Pure

 

 424.1  Aortic Valve Disorder

 

 715.96  Osteoarthrosis Unspec Genlzd Or Localized Lower Leg

 

 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

 

 V04.81  Need For Prophylactic Vaccination & Inoculation/Influenza









 Office Visit  10/03/2011  Orthopedic  Ashley Pereira  715.96  Osteoarthrosis



   10:45a  Services Of  RPA-C    Unspec Genlzd Or



     C.M.A.      Localized Lower Leg

 

 Office Visit  2011  Orthopedic  Norbert Dennis  715.96  Osteoarthrosis



   1:45p  Services Of  M.D.    Unspec Genlzd Or



     C.M.A.      Localized Lower Leg

 

 Office Visit  2011  DO Not Use Cma  Vitaliy E.  401.1  Hypertension Benign



   11:00a  AT Mary Montanez M.D.    









 272.0  Hypercholesterolemia Pure

 

 424.1  Aortic Valve Disorder









 Office Visit  10/05/2010 10:00a  DO Not Use Cma  Vitaliy E.  V72.83  Examination



     AT Mary Montanez M.D.    Preoperative Other



           Spec









 V72.81  Examination Preoperative Cardiovascular

 

 719.46  Pain Joint Lower Leg

 

 401.1  Hypertension Benign

 

 272.0  Hypercholesterolemia Pure

 

 424.1  Aortic Valve Disorder

 

 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

 

 V04.81  Need For Prophylactic Vaccination & Inoculation/Influenza









 Office Visit  2010  2:45p  Orthopedic  Dirk Sonny,  715.96  
Osteoarthrosis



     Services Of  M.D.    Unspec Genlzd Or



     C.M.A.      Localized Lower Leg

 

 Office Visit  2010  9:00a  DO Not Use Cma  Vitaliy E.  401.1  
Hypertension Benign



     AT Mary Montanez M.D.    









 272.0  Hypercholesterolemia Pure

 

 424.1  Aortic Valve Disorder

 

 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

 

 715.09  Osteoarthrosis Generalized Multiple Sites









 Office Visit  2010 11:00a  DO Not Use Cma  Vitaliy E.  401.1  
Hypertension Benign



     AT Mary Montanez M.D.    









 272.0  Hypercholesterolemia Pure

 

 424.1  Aortic Valve Disorder









 Office Visit  2009  9:30a  Chavez Burks EMartell  785.2  Murmur Cardiac



     Assoc AT  SREE Montanez    Undiagnosed



     [facility]      









 272.0  Hypercholesterolemia Pure

 

 401.1  Hypertension Benign









 Office Visit  2009 11:00a  Chavez Burks EMartell  785.2  Murmur Cardiac



     Assoc AT  SREE Montanez    Undiagnosed



     [facility]      









 272.0  Hypercholesterolemia Pure

 

 401.1  Hypertension Benign

 

 V06.5  Tetanus Diphtheria (DT)









 Office Visit  2008  Chavez Burks EMartell  272.0  Hypercholesterolemia 
Pure



   11:15a  Assoc AT  SREE Montanez    



     [facility]      









 401.1  Hypertension Benign









 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



   11:30a  Assoc AT  SREE Montanez    Other



     [facility]      

 

 Office Visit  2007  [facility]  Vitaliy RODRÍGUEZ  272.0  Hypercholesterolemia 
Pure



   9:45a  Assoc AT  SREE Montanez    



     [facility]      









 401.1  Hypertension Benign







Plan of Treatment

Future Appointment(s):2019  2:20 pm - Vitaliy Montanez M.D. at [facility] 
Internal Medicine [facility]2018 - Vitaliy Montanez M.D.R55 Syncope and 
collapseNew Orders:Overnight Oximetry, Ordered: 18Holter Monitor, Ordered
: 18I10 Essential (primary) hypertensionFollow up:wellness exam in 6 
months or prnE78.00 Pure hypercholesterolemia, [id number] Dyspnea, 
unspecified

## 2018-12-29 NOTE — XMS REPORT
Continuity of Care Document (CCD)

 Created on:2018



Patient:Albina Denis

Sex:Male

:1933

External Reference #:[id number]





Demographics







 Address  [address] [place] [address]

 

 Home Phone  [phone]

 

 Email Address  [email or web address]

 

 Preferred Language  en

 

 Marital Status  Not  or 

 

 Jehovah's witness Affiliation  Unknown

 

 Race  White

 

 Ethnic Group  Not  or 









Author







 Name  Cyndy Fischer









Support







 Name  Relationship  Address  Phone

 

 Kim Desai  Unavailable  Unavailable  [phone]

 

 Cristofer Desai  Unavailable  Unavailable  [phone]

 

 Eladio Denis  Unavailable  Unavailable  [phone]









Care Team Providers







 Name  Role  Phone

 

 Vitaliy Montanez III, MD  Primary Care Physician  Unavailable









Payers







 Type  Date  Identification Numbers  Payment Provider  Subscriber

 

     Policy Number: [id number]  [facility]  Albina Denis









 PayID: [id number]  [address] 









 [place] [address]









     Policy Number: [id number]  Medicare  Albina Marquezn









 PayID: [id number]  [address]









 [place] [place] [address]









   Effective: 1998  Policy Number: [id number]  Medicare  Albina Marquezn









 Expires: 2015  PayID: [id number]  [address]









 [place] [address]









   Expires: 2015  Policy Number: [id number]  [facility]  Albina Denis









 PayID: [id number]  [address] [address]









 [place] [address]









   Expires: 2017  Policy Number:  [facility]  Albina Denis



     [id number]    









 PayID: [id number]  [address]









 [place] [address]







Advance Directives







 Type  Date  Description  Status  Comment

 

 Other Directive  2014  Health Care Proxy  Current and Verified  







Problems







 Date  Description  Provider  Status

 

 Onset: 10/05/2011  Benign essential hypertension  Vitaliy Montanez M.D.  
Active

 

 Onset: 10/05/2011  Pure hypercholesterolemia  Vitaliy Montanez M.D.  Active

 

 Onset: 10/05/2011  Aortic valve disorder  Vitaliy Montanez M.D.  Active

 

 Onset: 10/05/2011  Osteoarthritis of knee  Vitaliy Montanez M.D.  Active

 

 Onset: 10/05/2011  History of malignant neoplasm of  Vitaliy Montanez M.D.  
Active



   prostate    

 

 Onset: 10/10/2012  Mitral valve disorder  [facility]  Active

 

 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



   non-rheumatic    

 

 Onset: 04/10/2013  Aortic aneurysm  Vitaliy Montanez M.D.  Active

 

 Onset: 2014  Heart murmur  Qutaybeh S. Maghaydah, Active M.D.  

 

 Onset: 05/10/2017  Heart valve replacement  Vitaliy Montanez M.D.  Active

 

 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  Active



   behavioral disturbance    

 

 Onset: 2016  Essential hypertension  Vitaliy Montanez M.D.  Active







Family History







 Date  Family Member(s)  Problem(s)  Comments

 

   Father  Heart Disease  

 

   Father   due to Cancer  ()

 

   Mother   due to MI  () - possible

 

   Children  None  

 

   First Brother  MI  at 60's

 

   First Sister  knee replacement  

 

   Second Sister  Unknown  







Social History







 Type  Date  Description  Comments

 

 Birth Sex    Unknown  

 

 Marital Status      

 

 Occupation    Retired  works part time at



       [employer] in



       Summer, retired



       from bank

 

 Tobacco Use  Start: Unknown End:  Former Cigarette Smoker  



   Unknown    

 

 Smoking Status  Reviewed: 18  Former Cigarette Smoker  

 

 ETOH Use    Occasionally consumes  



     alcohol  

 

 Tobacco Use  Start: Unknown End:  Patient is a former  quit , 15-20



   Unknown  smoker  years. 1 pack per 3



       days, pipe twice a



       day

 

 Recreational Drug Use    Denies Drug Use  

 

 Exercise Type/Frequency    Exercises regularly  

 

 Exercise Type/Frequency    walks regularly.  







Allergies, Adverse Reactions, Alerts







 Description

 

 No Known Drug Allergies







Medications







 Medication  Date  Status  Form  Strength  Qnty  SIG  Indications  Ordering



                 Provider

 

 Melatonin    Active  Capsules  10mg  30cap  1 tab by            s  mouth at    Lisseth,



             bedtime as    M.D.



             needed for    



             insomnia    

 

 Antifungal    Active  Powder  2%  71gm  use on              skin    Lisseth,



             irritation    M.D.



             as needed    

 

 Desitin    Active  Cream  13%  1unit  apply            s  every    Lisseth,



             shift and    M.D.



             as needed    

 

 Amoxicillin  05/23  Active  Tablets  500mg  30tab  take one    Vitaliy E.



           s  by mouth    Lisseth,



             three    M.D.



             times a    



             day until    



             finished    

 

 Donepezil HCL    Active  Tablets  10mg  90tab  take 1  F03.90          s  tablet by    Lisseth,



             mouth once    M.D.



             daily at    



             bedtime    

 

 Memantine HCL    Active  Tablets  10mg  180ta  take one    .



           bs  tab by    Lisseth



             mouth    M.DMartell



             twice a    



             day    

 

 Bengay Greaseless    Active  Cream  10-15%  113gm  apply              twice    Lisseth,



             daily to    M.D.



             affected    



             area    

 

 Anoro Ellipta  11/10  Active  Aerosol  62.5-25mc  3unit  1 puff  R09.02  
Vitaliy Martell



         g/Inh  s  daily    SREE Montanez

 

 Depend Real-Fit    Active  Misc    150un  for use    Vitaliy [employer]  /2017        its  5-6 times    Lisseth



 L/XL Maximum            daily dx:    CLIFFDMartell



 Absorbency            c61,    



             n39.42,    



             f01.51    

 

 Depend Pant Large  10/31  Active  Misc    180un  use 4-6    Vitaliy Martell



           its  times a    Lisseth,



             day    MARISA.SANTIAGO

 

 Hydrochlorothiazi    Active  Tablets  12.5mg  90tab  1 by mouth    Vitaliy RODRÍGUEZ



         s  every day    SREE Montanez

 

 Imodium A-D    Active  Liquid  1mg/7.5ML  4oz  20    Martell



             milliliter    Lisseth,



             s (4mg) by    MARISA.SANTIAGO



             mouth with    



             1st loose    



             stool and    



             10    



             milliliter    



             s (2mg)    



             with    



             subsiquent    



             loose    



             stool as    



             needed    



             verbal ord    

 

 Fish Oil    Active  Capsules  1000mg  90cap  1 by mouth    Vitaliy RODRÍGUEZ



           s  daily dx    SREE Montanez

 

 Tylenol  10/15  Active  Tablets  325mg  100ta  2 tab by    Vitaliy RODRÍGUEZ



   /        bs  mouth    Lisseth,



             every 4    M.D.



             hours as    



             needed    



             pain    

 

 Vitamin D-1000    Active  Tablets  1000Unit  90tab  1 by mouth  M17.9  
Vitaliy RODRÍGUEZ



 Maximum Strength  /        s  every day    Lisseth,



             dx: m17.9    M.DMartell



             osteoarthi    



             tis    

 

 Amlodipine    Active  Tablets  10mg  45tab  1/2 by    Vitaliy RODRÍGUEZ



 Besylate          s  mouth    Lisseth,



             every day    M.DMartell

 

 Metoprolol    Active  Tablets  50mg  180ta  1 by mouth    Vitaliy RODRÍGUEZ



 Tartrate          bs  twice a    Lisseth,



             day dx:    M.D.



             htn    

 

 Simvastatin    Active  Tablets  20mg  90tab  take 1 by  E78.0  Vitaliy E.



           s  mouth    Lisseth,



             every day    M.D.

 

 Aspir-81    Active  Tablets DR  81mg  30tab  1 by mouth  E78.0  Vitaliy E.



   /        s  every day    Lisseth,



             dx:    M.DMartell



             hyperchole    



             sterol    

 

 Calcium 600    Active  Tablets  600mg  60tab  1 by mouth    Vitaliy E.



   /        s  twice a    Lisseth,



             day dx    M.D.



             code:    



             v70.0    



             routine    



             general    



             examinatio    



             n    

 

 Enalapril Maleate    Active  Tablets  10mg  90tab  1 by mouth    Vitaliy E.



   /        s  every day    SREE Montanez

 

                 

 

 Bactrim DS    Hx  Tablets  800-160mg  14tab  1 by mouth    Vitaliy E.



           s  twice a    Lisseth,



   -          day    M.DMartell



   10/17              



   /2018              

 

 Ampicillin    Hx  Capsules  500mg  12cap  take 1 tab    Vitaliy E.



           s  by mouth 4    Lisseth,



   -          times a    M.D.



             day for 3    



   /2018          days    

 

 Amoxicillin    Hx  Tablets  500mg  4tabs  take 4              tablets by    Lisseth,



   -          mouth 1    M.D.



   05/23          hour    



   /2018          before    



             dental    



             procedure    

 

 Namzaric  11/10  Hx  Caps ER  28-10mg  90cap  1 po daily  F03.  Vitaliy E.



       24HR    s      Devin Montanez M.D.



                 

 

 Namenda XR    Hx  Caps ER  28mg  90cap  1 by mouth  F03.90  Vitaliy E.



       24HR    s  every day    Devin Montanez M.D.



   11/10              



   /2017              

 

 Namenda XR  05/10  Hx  Caps ER  7mg  1caps  titration  F03.90  Vitaliy E.



       24HR      pack:    Lisseth



   -          start    M.DMartell



             daily and    



   /          increase    



             dose as    



             per    



             directions    

 

 Debrox    Hx  Solution  6.5%  30ml  5 to 10    Vitaliy E.



             drops in    Lisseth,



   -          each ear    M.D.



             twice    



             daily; not    



             to exceed    



             beyond 4    



             days. Pt    



             may use    



             own.    

 

 Loperamide HCL    Hx  Liquid  1mg/5ML  200ml  10ml by    Vitaliy E.



             mouth as    Lisseth,



   -          needed for    M.D.



             diarrhea    



                 

 

 Systane    Hx  Solution  0.4-0.3%  60ml  one drop    Vitaliy RODRÍGUEZ



             in left    Lisseth,



   -          eye as    M.D.



   05/10          needed for    



             dry eye    



             syndrome    

 

 Keflex    Hx  Capsules  500mg  10cap  1 by mouth  680.2  Vitaliy E.



           s  twice a    Lisseth,



   -          day    M.D.



                 

 

 Imodium A-D    Hx  Liquid  1mg/ 5 ML  4Oz  20    Vitaliy E.



             milliliter    Lisseth,



   -          s (4mg) by    M.D.



             mouth with    



             1st loose    



             stool and    



             10    



             milliliter    



             s (2mg)    



             with    



             subsiquent    



             loose    



             stool as    



             needed    



             verbal    



             orde    

 

 Imodium A-D    Hx  Chewtabs  2mg  20uni  2 tabs by    Vitaliy E.



           ts  mouth as    Lisseth,



   -          needed for    M.D.



             diarrhea.    



                 

 

 Imodium A-D    Hx  Liquid  1mg/7.5ML  120ml  as needed    Vitaliy E.



             for    Lisseth,



   -          diarrhea    M.D.



                 

 

 Saline Nasal    Hx  Solution  0.65%  3unit  use prn    Vitaliy RODRÍGUEZ



 Spray          s      Lisseth,



   -              M.D.



                 

 

 Fish Oil Double    Hx  Capsules  1200mg  30cap  2 in    Qutaybeh



 Strength  /        s  a.m.and 1    S.



   -          at night    Maghaydah



                 , M.D.



                 

 

 Fish Oil Extra    Hx  Capsules  1200mg  30cap  1 by mouth    Vitaliy EMartell



 Strength          s  every day    Lisseth,



   -          DX code:    .D.



             v70.0    



             General    



             examinatio    



             n    

 

 Fish Oil Ultra    Hx  Capsules  1000mg  30cap  1 by mouth    Vitaliy EMartell



   /        s  daily dx    Devin Montanez          v70.0    .D.



                 

 

 Vitamin D    Hx  Tablets  1000Unit  30tab  1 po qd dx    Vitaliy RODRÍGUEZ



           s  code:    Lisseth



   -          v70.0    M.DMartell



   04/24          Routine    



   /2014          general    



             examinatio    



             n    

 

 Centrum    Hx  Tablets    30tab  take 1 po    Vitaliy Martell



           s  daily, Dx    Lisseth,



   -          code:    M.DMartell



             v70.0    



             General    



             Health    



             Screeing    



             examinatio    



             n    

 

 Donepezil HCL    Hx  Tablets  5mg  60tab  2 by mouth  F03.90  Martell



       Dispers    s  every day    Devin Montanez M.D.



   11/10              



   /2017              

 

 Amiodarone HCL    Hx  Tablets  200mg  100ta  1 po bid            bs  for 21    Ordering



   -          days    Provider



                 

 

 Lasix    Hx  Tablets  40mg  30tab  1 po qd            s  for 10    Ordering



   -          days    Provider



                 

 

 Potassium    Hx  Tablets ER  10Meq    1 po qd x    Other



 Chloride           10 days    Ordering



   -              Provider



                 

 

 Hydrochlorothiazi    Hx  Tablets  25mg  90tab  1 by mouth    Vitaliy RODRÍGUEZ



         s  every day    Devin Montanez          resume    M.D.



   05/12          after    



   /2017          finishing    



             lasix    

 

 Hydrochlorothiazi  04/10  Hx  Tablets  10mg  90tab  1 po qd    Vitaliy RODRÍGUEZ



         s      Devin Montanez M.D.



                 

 

 Anucort-HC  04/10  Hx  Suppository  25mg  10uni  1 tab  V10.46  Vitaliy Martell



           ts  daily per    Devin Montanez          rectum    M.DMartell



   10/10          after any    



   /2013          rectal    



             bleeding    

 

 Norvasc    Hx  Tablets  10mg  90tab  1 PO qd    Romario



   /        s      Devin Hansen M.D.,FACP



                 

 

 Anusol-HC    Hx  Suppository  25mg  25uni  1per    Vitaliy RODRÍGUEZ



           ts  rectum bid    Lisseth



   -          x 1 w,then    M.D.



   10/05          qd x 1 wk    



             then d/c    

 

 Glucosamine    Hx  Capsules  500-400    1 PO bid    Lisseth



   /0000              III,



   -              Vitaliy



   10/05              MD ANNE



                 

 

 Zocor  00/  Hx  Tablets  20mg  90tab  one po qhs    Vitaliy E.



   /0000        s      Devin Montanez M.D.



                 

 

 Norvasc    Hx  Tablets  5mg  90tab  1 PO qd    Pedro,



   /        nette Moe MD



   -              



                 

 

 Hydrochlorothiazi    Hx  Tablets  25mg  90tab  1 po qd    Vitaliy RODRÍGUEZ



 de  /        s      Devin Montanez M.D.



   04/10              



   /2013              

 

 Centrum Silver    Hx  Tablets  1  30tab  take 1 po    Lisseth



   /0000        s  daily, Dx    III,



   -          code:    Vitaliy



             v70.0    MD ANNE



   /          General    



             Health    



             Screeing    



             examinatio    



             n    

 

 Fish Oil    Hx  Capsules  1200/2000  30cap  1 by mouth    Vitaliy E.



 W/Vitamin D  /0000      mg  s  every day    Lisseth,



   -          dx code:    M.SANTIAGO



             v70.0    



             routine    



             general    



             screening/    



             examinatio    



             n    

 

 Vitamin A&D    Hx  Capsules  5000/400        Unknown



   /0000              



   -              



   10/10              



   /2012              

 

 Selenium    Hx  Tablets  200mcg        Unknown



   /0000              



   -              



                 

 

 Magnesium    Hx  Tablets  250mg    1 po qd    Unknown



   /0000              



   -              



                 

 

 Anusol HC    Hx    25mgM  21uni  1    Vitaliy E.



   /0000      Supp  ts  suppositor    Lisseth



   -          y per    M.DMartell



             rectum tid    



   /          max 2    



             weeks    

 

 Norvasc    Hx  Tablets  5mg  180ta  1 po bid    Vitaliy E.



   /0000        bs      Devin Montanez              MCAROL



                 

 

 Vasotec    Hx  Tablets  10mg  90tab  Take 1    Vitaliy E.



   /0000        s  Tablet    Lisseth,



   -          Daily    M.D.



   10/10              



   /2013              

 

 Vitamin D-1000    Hx  Tablets  1000Unit  30tab  1 po qd dx    Unknown



   /0000        s  code:    



   -          V70.0    



             Routine    



             general    



             examinatio    



             n    

 

 Cyclobenzaprine    Hx  Tablets  10mg    one by    Unknown



 HCL  /0000          mouth    



   -          three    



             times           day as    



             needed    



             spasm    

 

 Acetaminophen-Cod    Hx  Tablets  300-30mg    1 tab by    Unknown



 eine #3  /0000          mouth q 6    



   -          hrs prn    



             for pain    



   /2017              







Medications Administered in Office







 Medication  Date  Status  Form  Strength  Qnty  SIG  Indications  Ordering



                 Provider

 

 PPD    Administered  Injection          Vitaliy Montanez M.D.







Immunizations







 CPT Code  Status  Date  Vaccine  Lot #

 

 [id number]  Given  2017  Influenza Virus Vaccine, Quadrivalent, Split,  



       Preservative Free  

 

 61489  Given  10/21/2015  Influenza Virus Vaccine, Quadrivalent, Split,  



       Preservative Free  

 

 04284  Given  2015  Pneumococcal Conjugate Vaccine 13 Valent For  [id number]



       Intramuscular Use  

 

   Given  10/08/2014  Flu Vaccine NOS  

 

 73402  Given  10/10/2013  Flu Vaccine Split Virus Preservative Free For  
[id number]



       Indiv 3Yr Older  

 

   Given  10/05/2011  Fluzone Vaccine  

 

 32561  Given  10/05/2010  Influenza Virus 3Yrs & Over  [id number]

 

 22200  Given  2009  Influenza Virus Vaccine, Pandemic Formulation  

 

 71732  Given  2009  Tetanus And Diptheria (Td) For Adult Use  



       Preservative Free  

 

 45408  Given  2008  Influenza Virus 3Yrs & Over  

 

 98533  Given  10/04/2007  Influenza Virus 3Yrs & Over  [id number]

 

 54874  Given  10/03/2007  Influenza Virus 3Yrs & Over  

 

 04403  Given  1998  Pneumonia Vaccine  

 

 49016  Given  1998  Pneumovax (History By Patient)  







Vital Signs







 Date  Vital  Result  Comment

 

 2018  3:08pm  Height  68 inches  5'8" not checked









 Weight  207.00 lb  

 

 Heart Rate  69 /min  

 

 BP Systolic Sitting  120 mmHg  

 

 BP Diastolic Sitting  60 mmHg  

 

 O2 % BldC Oximetry  92 %  

 

 BMI (Body Mass Index)  31.5 kg/m2  









 2018 10:16am  Height  68 inches  5'8" not checked









 Weight  205.38 lb  

 

 Heart Rate  74 /min  

 

 BP Systolic Sitting  124 mmHg  

 

 BP Diastolic Sitting  68 mmHg  

 

 O2 % BldC Oximetry  95 %  

 

 BMI (Body Mass Index)  31.2 kg/m2  









 2018  1:59pm  Weight  209.25 lb  









 Heart Rate  62 /min  

 

 BP Systolic Sitting  122 mmHg  

 

 BP Diastolic Sitting  68 mmHg  

 

 O2 % BldC Oximetry  91 %  









 2018 10:39am  Weight  204.00 lb  









 Heart Rate  61 /min  

 

 BP Systolic Sitting  120 mmHg  

 

 BP Diastolic Sitting  72 mmHg  

 

 Body Temperature  97.1 F  

 

 O2 % BldC Oximetry  95 %  dropped to 91% with walking









 11/10/2017 11:09am  Height  68 inches  5'8"









 Weight  202.00 lb  

 

 Heart Rate  53 /min  

 

 BP Systolic Sitting  116 mmHg  

 

 BP Diastolic Sitting  64 mmHg  

 

 Body Temperature  97.5 F  

 

 O2 % BldC Oximetry  96 %  dropped to 82% with walking

 

 BMI (Body Mass Index)  30.7 kg/m2  









 05/10/2017  1:19pm  Height  68 inches  5'8"









 Weight  198.00 lb  

 

 Heart Rate  56 /min  

 

 BP Systolic Sitting  126 mmHg  

 

 BP Diastolic Sitting  82 mmHg  

 

 Respiratory Rate  14 /min  

 

 O2 % BldC Oximetry  97 %  87 %  after walking 120 feet

 

 BMI (Body Mass Index)  30.1 kg/m2  









 2016  2:13pm  Weight  190.00 lb  









 Heart Rate  62 /min  

 

 BP Systolic Sitting  128 mmHg  

 

 BP Diastolic Sitting  80 mmHg  

 

 Respiratory Rate  14 /min  

 

 O2 % BldC Oximetry  96 %  









 2016 11:49am  Weight  190.00 lb  









 Heart Rate  63 /min  

 

 BP Systolic Sitting  130 mmHg  

 

 BP Diastolic Sitting  73 mmHg  

 

 O2 % BldC Oximetry  93 %  









 2016  2:02pm  Height  68 inches  5'8"









 Weight  195.00 lb  

 

 Heart Rate  55 /min  

 

 BP Systolic Sitting  124 mmHg  

 

 BP Diastolic Sitting  72 mmHg  

 

 Respiratory Rate  14 /min  

 

 Body Temperature  97.7 F  

 

 O2 % BldC Oximetry  95 %  

 

 BMI (Body Mass Index)  29.6 kg/m2  









 2015  3:02pm  Height  68 inches  5'8"









 Weight  185.00 lb  

 

 Heart Rate  58 /min  

 

 BP Systolic Sitting  124 mmHg  

 

 BP Diastolic Sitting  82 mmHg  

 

 O2 % BldC Oximetry  97 %  

 

 BMI (Body Mass Index)  28.1 kg/m2  









 2015  2:45pm  Height  68 inches  5'8"









 Weight  187.00 lb  

 

 Heart Rate  60 /min  

 

 BP Systolic Sitting  144 mmHg  

 

 BP Diastolic Sitting  80 mmHg  

 

 Respiratory Rate  14 /min  

 

 BMI (Body Mass Index)  28.4 kg/m2  









 2014  9:57am  Height  66.75 inches  5'6.75"









 Weight  162.00 lb  

 

 Heart Rate  58 /min  

 

 BP Systolic Sitting  162 mmHg  

 

 BP Diastolic Sitting  68 mmHg  

 

 Body Temperature  96.7 F  

 

 BMI (Body Mass Index)  25.6 kg/m2  









 2014  1:15pm  Weight  166.00 lb  









 Heart Rate  58 /min  

 

 BP Systolic Sitting  100 mmHg  

 

 BP Diastolic Sitting  16 mmHg  

 

 Body Temperature  98.7 F  









 2014  1:56pm  Height  67 inches  5'7"









 Weight  165.00 lb  

 

 Heart Rate  82 /min  

 

 BP Systolic Sitting  144 mmHg  

 

 BP Diastolic Sitting  70 mmHg  

 

 Respiratory Rate  16 /min  

 

 BMI (Body Mass Index)  25.8 kg/m2  









 10/10/2013  9:59am  Height  67 inches  5'7"









 Weight  165.50 lb  

 

 Heart Rate  68 /min  

 

 BP Systolic Sitting  130 mmHg  

 

 BP Diastolic Sitting  64 mmHg  

 

 BMI (Body Mass Index)  25.9 kg/m2  









 04/10/2013  8:44am  Height  67.25 inches  5'7.25"









 Weight  174.00 lb  

 

 Heart Rate  74 /min  

 

 BP Systolic Sitting  114 mmHg  

 

 BP Diastolic Sitting  78 mmHg  

 

 BMI (Body Mass Index)  27.0 kg/m2  









 2012  9:33am  Height  67.25 inches  5'7.25"









 Weight  174.00 lb  

 

 Heart Rate  60 /min  irregular

 

 BP Systolic Sitting  154 mmHg  

 

 BP Diastolic Sitting  76 mmHg  

 

 BMI (Body Mass Index)  27.0 kg/m2  









 10/10/2012  8:54am  Height  67.56 inches  5'7.56"









 Weight  176.00 lb  

 

 Heart Rate  88 /min  

 

 BP Systolic Sitting  152 mmHg  

 

 BP Diastolic Sitting  72 mmHg  

 

 BMI (Body Mass Index)  27.1 kg/m2  









 2012  1:16pm  Height  68.50 inches  5'8.50"









 Weight  180.00 lb  

 

 Heart Rate  58 /min  

 

 BP Systolic Sitting  153 mmHg  

 

 BP Diastolic Sitting  68 mmHg  

 

 BMI (Body Mass Index)  27.0 kg/m2  









 10/05/2011 12:50pm  Height  68.50 inches  5'8.50"









 Weight  181.00 lb  

 

 Heart Rate  68 /min  

 

 BP Systolic Sitting  160 mmHg  

 

 BP Diastolic Sitting  80 mmHg  

 

 BMI (Body Mass Index)  27.1 kg/m2  









 2011 11:10am  Weight  180.00 lb  









 Heart Rate  79 /min  

 

 BP Systolic Sitting  138 mmHg  

 

 BP Diastolic Sitting  78 mmHg  

 

 O2 % BldC Oximetry  94 %  









 10/05/2010 10:07am  Height  67 inches  5'7"









 Weight  187.00 lb  

 

 Heart Rate  82 /min  

 

 BP Systolic Sitting  124 mmHg  

 

 BP Diastolic Sitting  70 mmHg  

 

 BMI (Body Mass Index)  29.3 kg/m2  









 2010  8:54am  Weight  184.00 lb  









 Heart Rate  74 /min  

 

 BP Systolic Sitting  120 mmHg  

 

 BP Diastolic Sitting  70 mmHg  









 2010 11:27am  Weight  186.00 lb  









 Heart Rate  64 /min  

 

 BP Systolic Sitting  130 mmHg  

 

 BP Diastolic Sitting  80 mmHg  









 2009  9:47am  Height  67 inches  5'7"









 Weight  182.00 lb  down 8#

 

 Heart Rate  68 /min  

 

 BP Systolic Sitting  124 mmHg  

 

 BP Diastolic Sitting  68 mmHg  

 

 BMI (Body Mass Index)  28.5 kg/m2  









 2009 11:15am  Height  67 inches  5'7"









 Weight  190.00 lb  

 

 Heart Rate  60 /min  

 

 BP Systolic Sitting  124 mmHg  

 

 BP Diastolic Sitting  70 mmHg  

 

 BMI (Body Mass Index)  29.8 kg/m2  









 2008 11:32am  Height  67 inches  5'7"









 Weight  186.00 lb  

 

 Heart Rate  76 /min  

 

 BP Systolic Sitting  110 mmHg  

 

 BP Diastolic Sitting  70 mmHg  

 

 BMI (Body Mass Index)  29.1 kg/m2  









 2008 12:13pm  Height  67 inches  5'7"









 Weight  190.00 lb  

 

 Heart Rate  76 /min  

 

 BP Systolic Sitting  146 mmHg  

 

 BP Diastolic Sitting  74 mmHg  

 

 BMI (Body Mass Index)  29.8 kg/m2  









 2007  9:37am  Height  67 inches  5'7"









 Weight  191.00 lb  

 

 Heart Rate  76 /min  

 

 BP Systolic Sitting  138 mmHg  

 

 BP Diastolic Sitting  76 mmHg  

 

 BMI (Body Mass Index)  29.9 kg/m2  







Results







 Test  Date  Facility  Test  Result  H/L  Range  Note

 

 Laboratory test  2018  [facility]  Ammonia  40 mcmol/L  N  16-
53  



 finding    [address]          



     [place] [address] [phone]          









 B-Type Natriuretic Peptide BNP  155 pg/mL  High  <=100  









 Laboratory test  2018  [facility]  Magnesium  1.9 mg/dL  N  
1.9-2.7  



 finding    [address]          



     [place] [address] [phone]          









 C Reactive Protein  4.89 mg/L  N  <8.01  

 

 Troponin-I (TnI)  0.01 ng/mL    <0.04  1

 

 TSH (Thyroid Stim Horm)  1.67 mcIU/mL  N  0.34-5.60  









 Inr/Protime  2018  [facility]  Inr  0.96  N  0.77-1.02  



      [address]          



     [place] [address] [phone]          

 

 CBC Auto Diff  2018  [facility]  White Blood  10.4  N  3.5-
10.8  



      [address]  Count  10^3/uL      



     [place] [address] [phone]          









 Red Blood Count  4.06 10^6/uL  N  4.00-5.40  

 

 Hemoglobin  12.4 g/dL  Low  14.0-18.0  

 

 Hematocrit  38 %  Low  42-52  

 

 Mean Corpuscular Volume  93 fL  N  80-94  

 

 Mean Corpuscular Hemoglobin  31 pg  N  27-31  

 

 Mean Corpuscular HGB Conc  33 g/dL  N  31-36  

 

 Red Cell Distribution Width  15 %  N  10.5-15  

 

 Platelet Count  162 10^3/uL  N  150-450  

 

 Mean Platelet Volume  9.2 fL  N  7.4-10.4  

 

 Abs Neutrophils  8.0 10^3/uL  High  1.5-7.7  

 

 Abs Lymphocytes  1.0 10^3/uL  N  1.0-4.8  

 

 Abs Monocytes  1.0 10^3/uL  High  0-0.8  

 

 Abs Eosinophils  0.2 10^3/uL  N  0-0.6  

 

 Abs Basophils  0.1 10^3/uL  N  0-0.2  

 

 Abs Nucleated RBC  0 10^3/uL      

 

 Granulocyte %  77.5 %      

 

 Lymphocyte %  9.6 %      

 

 Monocyte %  9.6 %      

 

 Eosinophil %  2.4 %      

 

 Basophil %  0.9 %      

 

 Nucleated Red Blood Cells %  0.1      









 Comp Metabolic Panel  2018  [facility]  Sodium  138 mmol/L  N
  135-145  



     [address]          



     [place] [address] [phone]          









 Potassium  4.3 mmol/L  N  3.5-5.0  

 

 Chloride  108 mmol/L  N  101-111  

 

 Co2 Carbon Dioxide  28 mmol/L  N  22-32  

 

 Anion Gap  2 mmol/L  N  2-11  

 

 Glucose  102 mg/dL  High    

 

 Blood Urea Nitrogen  27 mg/dL  High  6-24  

 

 Creatinine  1.27 mg/dL  High  0.67-1.17  

 

 BUN/Creatinine Ratio  21.3  High  8-20  

 

 Calcium  9.0 mg/dL  N  8.6-10.3  

 

 Total Protein  6.5 g/dL  N  6.4-8.9  

 

 Albumin  3.4 g/dL  N  3.2-5.2  

 

 Globulin  3.1 g/dL  N  2-4  

 

 Albumin/Globulin Ratio  1.1  N  1-3  

 

 Total Bilirubin  0.40 mg/dL  N  0.2-1.0  

 

 Alkaline Phosphatase  67 U/L  N    

 

 Alt  20 U/L  N  7-52  

 

 Ast  22 U/L  N  13-39  

 

 Egfr Non-  53.9    >60  

 

 Egfr   65.2    >60  2









 Laboratory test  2018  [facility]  Lactic Acid  0.9 mmol/L  N
  0.5-2.0  3



 finding    [address]          



     [place] [address]          



     [phone]          

 

 Urine Culture And  2018  [facility]  Urine  SEE RESULT      4
, 5



 Sensitivities    [address]  Culture  BELOW      



     [place] [address] [phone]          

 

 Urinalysis Profile  2018  [facility]  Urine Color  Straw      



     [address]          



     [place] [address] [phone]          









 Urine Appearance  Clear      

 

 Urine Specific Gravity  1.010  N  1.010-1.030  

 

 Urine pH  6.0  N  5-9  

 

 Urine Urobilinogen  Negative    Negative  

 

 Urine Ketones  Negative    Negative  

 

 Urine Protein  Negative    Negative  

 

 Urine Leukocytes  Negative    Negative  

 

 Urine Blood  Negative    Negative  

 

 Urine Nitrite  Negative    Negative  

 

 Urine Bilirubin  Negative    Negative  

 

 Urine Glucose  Negative    Negative  

 

 Urine White Blood Cell  Trace(0-5/hpf)    Absent  

 

 Urine Red Blood Cell  Trace(0-2/hpf)    Absent  

 

 Urine Bacteria  Absent    Absent  

 

 Urine Squamous Epithelial Cell  Present  Abnormal  Absent  









 Laboratory test  2018  [facility]  B-Type  96 pg/mL      6



 finding    [address]  Natriuretic        



     [place] [address]  Peptide BNP        



     [phone]          

 

 CBC Auto Diff  2018  [facility]  White Blood Count  7.1 10^3/
uL  N  3.5-10  



     [address]        .8  



     [place] [address] [phone]          









 Red Blood Count  4.43 10^6/uL  N  4.00-5.40  

 

 Hemoglobin  13.7 g/dL  Low  14.0-18.0  

 

 Hematocrit  40 %  Low  42-52  

 

 Mean Corpuscular Volume  91 fL  N  80-94  

 

 Mean Corpuscular Hemoglobin  31 pg  N  27-31  

 

 Mean Corpuscular HGB Conc  34 g/dL  N  31-36  

 

 Red Cell Distribution Width  15 %  N  10.5-15  

 

 Platelet Count  174 10^3/uL  N  150-450  

 

 Mean Platelet Volume  8.7 um3  N  7.4-10.4  

 

 Abs Neutrophils  4.6 10^3/uL  N  1.5-7.7  

 

 Abs Lymphocytes  1.1 10^3/uL  N  1.0-4.8  

 

 Abs Monocytes  0.9 10^3/uL  High  0-0.8  

 

 Abs Eosinophils  0.4 10^3/uL  N  0-0.6  

 

 Abs Basophils  0.1 10^3/uL  N  0-0.2  

 

 Abs Nucleated RBC  0 10^3/uL      

 

 Granulocyte %  65.4 %  N  38-83  

 

 Lymphocyte %  15.8 %  Low  25-47  

 

 Monocyte %  12.3 %  High  0-7  

 

 Eosinophil %  5.6 %  N  0-6  

 

 Basophil %  0.9 %  N  0-2  

 

 Nucleated Red Blood Cells %  0      









 Comp Metabolic Panel  2018  [facility]  Sodium  137 mmol/L  N
  135-145  



     [address]          



     [place] [address] [phone]          









 Potassium  4.1 mmol/L  N  3.5-5.0  

 

 Chloride  103 mmol/L  N  101-111  

 

 Co2 Carbon Dioxide  30 mmol/L  N  22-32  

 

 Anion Gap  4 mmol/L  N  2-11  

 

 Glucose  107 mg/dL  High    

 

 Blood Urea Nitrogen  21 mg/dL  N  6-24  

 

 Creatinine  1.17 mg/dL  N  0.67-1.17  

 

 BUN/Creatinine Ratio  17.9  N  8-20  

 

 Calcium  9.3 mg/dL  N  8.6-10.3  

 

 Total Protein  7.1 g/dL  N  6.4-8.9  

 

 Albumin  3.5 g/dL  N  3.2-5.2  

 

 Globulin  3.6 g/dL  N  2-4  

 

 Albumin/Globulin Ratio  1.0  N  1-3  

 

 Total Bilirubin  0.70 mg/dL  N  0.2-1.0  

 

 Alkaline Phosphatase  68 U/L  N    

 

 Alt  24 U/L  N  7-52  

 

 Ast  22 U/L  N  13-39  

 

 Egfr Non-  59.2    >60  

 

 Egfr   71.7    >60  7









 Laboratory test  2018  [facility]  Acetaminophen  < 15 g/mL
      8



 finding    [address]          



     [place] [address] [phone]          









 Alcohol  < 10 mg/dL  N  <10  

 

 Salicylate  < 2.50 mg/dL    <30  

 

 TSH (Thyroid Stim Horm)  1.06 mcIU/mL  N  0.34-5.60  









 Urine Drug  2018  [facility]  Amphetamine Ur  None Detected  
  None Detect  



 [facility] ED &    [address]  Screen        



 [place] [address] [phone]          









 Barbiturates Urine Screen  None Detected    None Detect  

 

 Benzodiazepine Urine Screen  None Detected    None Detect  

 

 Urine Cannabinoids Screen  None Detected    None Detect  

 

 Urine Cocaine Screen  None Detected    None Detect  

 

 Urine Opiates Screen  None Detected    None Detect  

 

 Urine Phencyclidine Screen  None Detected    None Detect  9









 Urinalysis Profile  2018  [facility]  Urine Color  Yellow    
  



     [address]          



     [place] [address] [phone]          









 Urine Appearance  Clear      

 

 Urine Specific Gravity  1.016  N  1.010-1.030  

 

 Urine pH  6.0  N  5-9  

 

 Urine Urobilinogen  Negative    Negative  

 

 Urine Ketones  Negative    Negative  

 

 Urine Protein  Negative    Negative  

 

 Urine Leukocytes  Negative    Negative  

 

 Urine Blood  Negative    Negative  

 

 *  *  Abnormal  Negative  10

 

 Urine Nitrite  Negative    Negative  

 

 Urine Bilirubin  Negative    Negative  

 

 Urine Glucose  Negative    Negative  









 Urine Culture And  2018  [facility]  Urine Culture  SEE 
RESULT      11, 12



 Sensitivities    [address]    BELOW      



     [place] [address] [phone]          

 

 Urinalysis Profile  2018  [facility]  Urine Color  Yellow    
  13



     [address]          



     [place] [address] [phone]          









 Urine Appearance  Clear      

 

 Urine Specific Gravity  1.019  N  1.010-1.030  

 

 Urine pH  6.0  N  5-9  

 

 Urine Urobilinogen  Negative    Negative  

 

 Urine Ketones  Negative    Negative  

 

 Urine Protein  Negative    Negative  

 

 Urine Leukocytes  Negative    Negative  

 

 Urine Blood  Negative    Negative  

 

 Urine Nitrite  Negative    Negative  

 

 Urine Bilirubin  Negative    Negative  

 

 Urine Glucose  Negative    Negative  









 Urine Culture And  2018  [facility]  Urine Culture  SEE 
RESULT      14



 Sensitivities    [address]    [place]EL[place]      



     [place] [address] [phone]          

 

 Lipid Profile  2018  [facility]  Triglycerides  110 mg/dL    
  15, 16



 (Trig/Chol/HDL)    [address]          



     [place] [address] [phone]          









 Cholesterol  158 mg/dL      17

 

 HDL Cholesterol  47.1 mg/dL      18

 

 LDL Cholesterol  89 mg/dL      19









 Comp Metabolic Panel  2018  [facility]  Sodium  139 mmol/L  N
  139-145  



      [address]          



     [place] [address] [phone]          









 Potassium  4.4 mmol/L  N  3.5-5.0  

 

 Chloride  103 mmol/L  N  101-111  

 

 Co2 Carbon Dioxide  28 mmol/L  N  22-32  

 

 Anion Gap  8 mmol/L  N  2-11  

 

 Glucose  94 mg/dL  N    

 

 Blood Urea Nitrogen  24 mg/dL  N  6-24  

 

 Creatinine  1.24 mg/dL  High  0.67-1.17  

 

 BUN/Creatinine Ratio  19.4  N  8-20  

 

 Calcium  9.5 mg/dL  N  8.6-10.3  

 

 Total Protein  7.0 g/dL  N  6.4-8.9  

 

 Albumin  3.5 g/dL  N  3.2-5.2  

 

 Globulin  3.5 g/dL  N  2-4  

 

 Albumin/Globulin Ratio  1.0  N  1-3  

 

 Total Bilirubin  0.40 mg/dL  N  0.2-1.0  

 

 Alkaline Phosphatase  67 U/L  N    

 

 Alt  24 U/L  N  7-52  

 

 Ast  25 U/L  N  13-39  

 

 Egfr Non-  55.4    >60  

 

 Egfr   71.3    >60  20









 Rapid Influenza  2017  [facility]  Influenza A  NEGATIVE    
Negative  21



 A & B Molecular    [address]  Molecular        



     [place] [address] [phone]          









 Influenza B Molecular  NEGATIVE    Negative  









 Basic Metabolic  11/10/2017  [facility]  Sodium  131 mmol/L  Low  
133-145  



 Panel    [address] [place] [address] [phone]          









 Potassium  4.1 mmol/L  N  3.5-5.0  

 

 Chloride  95 mmol/L  Low  101-111  

 

 Co2 Carbon Dioxide  31 mmol/L  N  22-32  

 

 Anion Gap  5 mmol/L  N  2-11  

 

 Glucose  92 mg/dL  N    

 

 Blood Urea Nitrogen  24 mg/dL  N  6-24  

 

 Creatinine  1.24 mg/dL  High  0.67-1.17  

 

 BUN/Creatinine Ratio  19.4  N  8-20  

 

 Calcium  9.7 mg/dL  N  8.6-10.3  

 

 Egfr Non-  55.5    >60  

 

 Egfr   71.4    >60  22









 Basic Metabolic  05/10/2017  [facility]  Sodium  128 mmol/L  Low  
133-145  



 Panel    [address]          



     [place] [address] [phone]          









 Potassium  4.5 mmol/L  N  3.5-5.0  

 

 Chloride  94 mmol/L  Low  101-111  

 

 Co2 Carbon Dioxide  29 mmol/L  N  22-32  

 

 Anion Gap  5 mmol/L  N  2-11  

 

 Glucose  102 mg/dL  High    

 

 Blood Urea Nitrogen  23 mg/dL  N  6-24  

 

 Creatinine  1.02 mg/dL  N  0.67-1.17  

 

 BUN/Creatinine Ratio  22.5  High  8-20  

 

 Calcium  9.3 mg/dL  N  8.6-10.3  

 

 Egfr Non-  69.6  N  >60  

 

 Egfr   89.5  N  >60  23









 Lipid Profile  2017  [facility]  Triglycerides  115 mg/dL  N 
   24, 25



 (Trig/Chol/HDL)    [address]          



     [place] [address] [phone]          









 Cholesterol  142 mg/dL  N    26

 

 HDL Cholesterol  44.2 mg/dL  N    27

 

 LDL Cholesterol  75 mg/dL  N    28









 Comp Metabolic Panel  2017  [facility]  Sodium  128 mmol/L  
Low  133-145  



     [address]          



     [place] [address] [phone]          









 Potassium  3.8 mmol/L  N  3.5-5.0  

 

 Chloride  96 mmol/L  Low  101-111  

 

 Co2 Carbon Dioxide  26 mmol/L  N  22-32  

 

 Anion Gap  6 mmol/L  N  2-11  

 

 Glucose  89 mg/dL  N    

 

 Blood Urea Nitrogen  21 mg/dL  N  6-24  

 

 Creatinine  1.07 mg/dL  N  0.67-1.17  

 

 BUN/Creatinine Ratio  19.6  N  8-20  

 

 Calcium  9.2 mg/dL  N  8.6-10.3  

 

 Total Protein  6.8 g/dL  N  6.4-8.9  

 

 Albumin  3.6 g/dL  N  3.2-5.2  

 

 Globulin  3.2 g/dL  N  2-4  

 

 Albumin/Globulin Ratio  1.1  N  1-3  

 

 Total Bilirubin  0.70 mg/dL  N  0.2-1.0  

 

 Alkaline Phosphatase  52 U/L  N    

 

 Alt  14 U/L  N  7-52  

 

 Ast  19 U/L  N  13-39  

 

 Egfr Non-  65.8  N  >60  

 

 Egfr   84.7  N  >60  29









 Urinalysis Profile  2017  [facility]  Urine Color  Yellow  N 
   30



     [address]          



     [place] [address] [phone]          









 Urine Appearance  Clear  N    

 

 Urine Specific Gravity  1.017  N  1.010-1.030  

 

 Urine pH  5.0  N  5-9  

 

 Urine Urobilinogen  Negative  N  Negative  

 

 Urine Ketones  Negative  N  Negative  

 

 Urine Protein  Negative  N  Negative  

 

 Urine Leukocytes  Negative  N  Negative  

 

 Urine Blood  Negative  N  Negative  

 

 Urine Nitrite  Negative  N  Negative  

 

 Urine Bilirubin  Negative  N  Negative  

 

 Urine Glucose  Negative  N  Negative  









 Urine Culture And  2017  [facility]  Urine Culture  SEE 
RESULT      31



 Sensitivities    [address]    BELOW      



     [place] [address] [phone]          

 

 Laboratory test  2016  [facility]  Erythrocyte Sed  35 mm/Hr  
N  0-40  



 finding    [address]  Rate        



     [place] [address] [phone]          









 C Reactive Protein  13.76 mg/L  High  < 5.00  32

 

 Uric Acid  8.3 mg/dL  High  4.4-7.6  









 Lipid Profile  2016  [facility]  Triglycerides  99 mg/dL  N  
  33



 (Trig/Chol/HDL)    [address]          



     [place] [address] [phone]          









 Cholesterol  150 mg/dL  N    34

 

 HDL Cholesterol  50.1 mg/dL  N    35

 

 LDL Cholesterol  80 mg/dL  N    36









 Comp Metabolic Panel  2016  [facility]  Sodium  137 mmol/L  N
  133-145  



     [address]          



     [place] [address] [phone]          









 Potassium  4.0 mmol/L  N  3.5-5.0  

 

 Chloride  103 mmol/L  N  101-111  

 

 Co2 Carbon Dioxide  26 mmol/L  N  22-32  

 

 Anion Gap  8 mmol/L  N  2-11  

 

 Glucose  93 mg/dL  N    

 

 Blood Urea Nitrogen  25 mg/dL  High  6-24  

 

 Creatinine  1.33 mg/dL  High  0.67-1.17  

 

 BUN/Creatinine Ratio  18.8  N  8-20  

 

 Calcium  9.5 mg/dL  N  8.6-10.3  

 

 Total Protein  6.9 g/dL  N  6.4-8.9  

 

 Albumin  3.9 g/dL  N  3.2-5.2  

 

 Globulin  3.0 g/dL  N  2-4  

 

 Albumin/Globulin Ratio  1.3  N  1-3  

 

 Total Bilirubin  0.70 mg/dL  N  0.2-1.0  

 

 Alkaline Phosphatase  54 U/L  N    

 

 Alt  23 U/L  N  7-52  

 

 Ast  26 U/L  N  13-39  

 

 Egfr Non-  51.3  N  >60  

 

 Egfr   66.0  N  >60  37









 Laboratory test  2016  [facility]  PSA Screening  4.854  High
  0-4.000  38



 finding    [address]    ng/mL      



     [place] [address] [phone]          

 

 Lipid Profile  2015  [facility]  Triglycerides  133 mg/dL  N 
   39



 (Trig/Chol/HDL)    [address]          



     [place] [address] [phone]          









 Cholesterol  170 mg/dL  N    40

 

 HDL Cholesterol  51.4 mg/dL  N    41

 

 LDL Cholesterol  92 mg/dL  N    42









 Comp Metabolic Panel  2015  [facility]  Sodium  134 mmol/L  N
  133-145  



     [address]          



     [place] [address] [phone]          









 Potassium  4.3 mmol/L  N  3.5-5.0  

 

 Chloride  103 mmol/L  N  101-111  

 

 Co2 Carbon Dioxide  26 mmol/L  N  22-32  

 

 Anion Gap  5 mmol/L  N  2-11  

 

 Glucose  93 mg/dL  N    

 

 Blood Urea Nitrogen  23 mg/dL  N  6-24  

 

 Creatinine  1.21 mg/dL  High  0.67-1.17  

 

 BUN/Creatinine Ratio  19.0  N  8-20  

 

 Calcium  9.6 mg/dL  N  8.6-10.3  

 

 Total Protein  7.4 g/dL  N  6.4-8.9  

 

 Albumin  4.0 g/dL  N  3.2-5.2  

 

 Globulin  3.4 g/dL  N  2-4  

 

 Albumin/Globulin Ratio  1.2  N  1-3  

 

 Total Bilirubin  0.70 mg/dL  N  0.2-1.0  

 

 Alkaline Phosphatase  61 U/L  N    

 

 Alt  19 U/L  N  7-52  

 

 Ast  26 U/L  N  13-39  

 

 Egfr Non-  57.6  N  >60  

 

 Egfr   74.0  N  >60  43









 Laboratory test  2015  [facility]  PSA Diagnostic  3.484  N  0
-4.000  44



 finding    [address]    ng/mL      



     [place] [address] [phone]          

 

 Creatinine  2014  [facility]  Creatinine  1.14 mg/dL  N  0.67-
1.17  



     [address]          



     [place] [address] [phone]          









 Egfr Non-  61.7  N  >60  

 

 Egfr   79.3  N  >60  45









 CBC No Diff  2014  [facility]  White Blood  6.3 10^3/uL    4.8
-10.8  



     [address]  Count        



     [place] [address] [phone]          









 Red Blood Count  4.52 10^6/uL    4.0-5.4  

 

 Hemoglobin  13.6 g/dL  Low  14.0-18.0  

 

 Hematocrit  42 %    42-52  

 

 Mean Corpuscular Volume  92 fL    80-94  

 

 Mean Corpuscular Hemoglobin  30 pg    27-31  

 

 Mean Corpuscular HGB Conc  33 g/dL    31-36  

 

 Red Cell Distribution Width  13 %    10.5-15  

 

 Platelet Count  231 10^3/uL    150-450  

 

 Mean Platelet Volume  9 um3    7.4-10.4  









 Inr/Protime  2014  [facility]  Inr  0.89    0.85-1.06  



     [address]          



     [place] [address] [phone]          

 

 Basic Metabolic  2014  [facility]  Sodium  138 mmol/L    133-
145  



 Panel    [address]          



     [place] [address] [phone]          









 Potassium  4.1 mmol/L    3.5-5.0  

 

 Chloride  101 mmol/L    101-111  

 

 Co2 Carbon Dioxide  30.0 mmol/L    22-32  

 

 Anion Gap  7.0 mmol/L    2-11  

 

 Glucose  82 mg/dL      

 

 Blood Urea Nitrogen  19 mg/dL    6-24  

 

 Creatinine  1.00 mg/dL    0.50-1.40  

 

 BUN/Creatinine Ratio  19.0    8-20  

 

 Calcium  9.9 mg/dL    8.1-9.9  

 

 Egfr Non-  71.9    >60  

 

 Egfr   92.5    >60  46









 CBC Auto Diff  2013  [facility]  White Blood  5.2 10^3/uL    
4.8-10.8  



     [address]  Count        



     [place] [address] [phone]          









 Red Blood Count  4.29 10^6/uL    4.0-5.4  

 

 Hemoglobin  13.7 g/dL  Low  14.0-18.0  

 

 Hematocrit  40 %  Low  42-52  

 

 Mean Corpuscular Volume  94 fL    80-94  

 

 Mean Corpuscular Hemoglobin  32 pg  High  27-31  

 

 Mean Corpuscular HGB Conc  34 g/dL    31-36  

 

 Red Cell Distribution Width  14 %    10.5-15  

 

 Platelet Count  198 10^3/uL    150-450  

 

 Mean Platelet Volume  9 um3    7.4-10.4  

 

 Abs Neutrophils  3.3 10^3/uL    1.5-7.7  

 

 Abs Lymphocytes  1.2 10^3/uL    1.0-4.8  

 

 Abs Monocytes  0.5 10^3/uL    0-0.8  

 

 Abs Eosinophils  0.1 10^3/uL    0-0.6  

 

 Abs Basophils  0 10^3/uL    0-0.2  

 

 Abs Nucleated RBC  0 10^3/uL      

 

 Granulocyte %  63.4 %    38-83  

 

 Lymphocyte %  22.7 %  Low  25-47  

 

 Monocyte %  10.6 %  High  1-9  

 

 Eosinophil %  2.5 %    0-6  

 

 Basophil %  0.8 %    0-2  

 

 Nucleated Red Blood Cells %  0.1      









 Comp Metabolic Panel  2013  [facility]  Sodium  138 mmol/L    
133-145  



     [address]          



     [place] [address] [phone]          









 Potassium  3.9 mmol/L    3.5-5.0  

 

 Chloride  102 mmol/L    101-111  

 

 Co2 Carbon Dioxide  30.0 mmol/L    22-32  

 

 Anion Gap  6.0 mmol/L    2-11  

 

 Glucose  94 mg/dL      

 

 Blood Urea Nitrogen  17 mg/dL    6-24  

 

 Creatinine  0.90 mg/dL    0.50-1.40  

 

 BUN/Creatinine Ratio  18.9    8-20  

 

 Calcium  9.3 mg/dL    8.1-9.9  

 

 Total Protein  6.7 g/dL    6.2-8.1  

 

 Albumin  3.9 g/dL    3.2-5.2  

 

 Globulin  2.8 g/dL    2-4  

 

 Albumin/Globulin Ratio  1.4    1-3  

 

 Total Bilirubin  0.9 mg/dL    0.4-1.5  

 

 Alkaline Phosphatase  51 U/L      

 

 Alt  4 U/L  Low  14-54  

 

 Ast  30 U/L    12-42  

 

 Egfr Non-  81.2    >60  

 

 Egfr   104.4    >60  47









 Lipid Profile  2013  [facility]  Triglycerides  77 mg/dL    40
-200  



 (Trig/Chol/HDL)    [address]          



     [place] [address] [phone]          









 Cholesterol  147 mg/dL    Less than 200  

 

 HDL Cholesterol  61 mg/dL  High  40-60  48

 

 Cholesterol/HDL Ratio  2.4 Average    1-4.44  

 

 LDL Cholesterol  70.6    Less Than 100  49









 Laboratory test  2013  [facility]  PSA Diagnostic  1.83 ng/mL
    0-4.0  50



 finding    [address]          



     [place] [address] [phone]          

 

 CBC Auto Diff  10/12/2012  [facility]  White Blood  5.6    4.8-10.8
  



     [address]  Count  10^3/uL      



     [place] [address] [phone]          









 Red Blood Count  4.30 10^6/uL    4.0-5.4  

 

 Hemoglobin  13.5 g/dL  Low  14.0-18.0  

 

 Hematocrit  40 %  Low  42-52  

 

 Mean Corpuscular Volume  93 fL    80-94  

 

 Mean Corpuscular Hemoglobin  31 pg    27-31  

 

 Mean Corpuscular HGB Conc  34 g/dL    31-36  

 

 Red Cell Distribution Width  13 %    10.5-15  

 

 Platelet Count  190 10^3/uL    150-450  

 

 Mean Platelet Volume  9 um3    7.4-10.4  

 

 Abs Neutrophils  3.1 10^3/uL    1.5-7.7  

 

 Abs Lymphocytes  1.4 10^3/uL    1.0-4.8  

 

 Abs Monocytes  0.9 10^3/uL  High  0-0.8  

 

 Abs Eosinophils  0.2 10^3/uL    0-0.6  

 

 Abs Basophils  0 10^3/uL    0-0.2  

 

 Abs Nucleated RBC  0 10^3/uL      

 

 Granulocyte %  55.7 %    38-83  

 

 Lymphocyte %  24.9 %  Low  25-47  

 

 Monocyte %  15.4 %  High  1-9  

 

 Eosinophil %  3.4 %    0-6  

 

 Basophil %  0.6 %    0-2  

 

 Nucleated Red Blood Cells %  0.1      









 Comp Metabolic Panel  10/12/2012  [facility]  Sodium  138 mmol/L    
133-145  



     [address] [place] [address] [phone]          









 Potassium  4.0 mmol/L    3.5-5.0  

 

 Chloride  101 mmol/L    101-111  

 

 Co2 Carbon Dioxide  31.0 mmol/L    22-32  

 

 Anion Gap  6.0 mmol/L    2-11  

 

 Glucose  93 mg/dL      

 

 Blood Urea Nitrogen  18 mg/dL    6-24  

 

 Creatinine  1.00 mg/dL    0.50-1.40  

 

 BUN/Creatinine Ratio  18.0    8-20  

 

 Calcium  10.0 mg/dL  High  8.1-9.9  

 

 Total Protein  7.4 GM/DL    6.2-8.1  

 

 Albumin  4.2 GM/DL    3.2-5.2  

 

 Globulin  3.2 GM/DL    2-4  

 

 Albumin/Globulin Ratio  1.3    1-3  

 

 Total Bilirubin  1.0 mg/dL    0.1-1.0  51

 

 Alkaline Phosphatase  60 U/L      

 

 Alt  19 U/L    14-54  

 

 Ast  31 U/L    12-42  

 

 Egfr Non-  72.1    >60  

 

 Egfr   92.7    >60  52









 Lipid Profile  10/12/2012  [facility]  Triglycerides  63 mg/dL    40
-200  



 (Trig/Chol/HDL)    [address] [place], [place] [phone]          









 Cholesterol  160 mg/dL    Less than 200  53

 

 HDL Cholesterol  72 mg/dL  High  40-60  54

 

 Cholesterol/HDL Ratio  2.2 AVERAGE    1-4.44  

 

 LDL Cholesterol  75.4 mg/dL    Less Than 100  









 Laboratory test  2012  [facility]  PSA,Diagnostic  1.38 NG/ML
    0-4  55



 finding    [address]          



     [place] [address] [phone]          

 

 Laboratory test  2011  [facility]  PSA,Diagnostic  1.16 NG/ML
    0-4  56



 finding    [address]          



     [place] [address] [phone]          

 

 DR [facility]  2011    TSH  0.94    0.34-5.6  



 Panel        MIU/ML    0  

 

 Comp Metabolic  2011    Sodium  141 mmol/L    135-145  



 Panel              









 Potassium  4.0 mmol/L    3.5-5.0  

 

 Chloride  103 mmol/L    101-111  

 

 Co2 (Carbon Dioxide)  31.0 mmol/L    22-32  

 

 Anion Gap  7.0 mmol/L    2-11  57

 

 Glucose  101 mg/dL  High    

 

 BUN  14 mg/dL    6-24  

 

 Creatinine  1.00 mg/dL    0.50-1.40  

 

 One Over Creatinine  1.00      

 

 BUN/Creatinine Ratio  14.0    8-20  

 

 Calcium  9.6 mg/dL    8.1-9.9  

 

 Total Protein  7.0 GM/DL    6.2-8.1  

 

 Albumin  4.1 GM/DL    3.2-5.2  

 

 Globulin  2.9 GM/DL    2-4  

 

 Albumin/Globulin Ratio  1.4    1-3  

 

 Bilirubin Total  0.9 mg/dL    0.4-1.5  58

 

 Alkaline Phosphatase  51 U/L      

 

 Alt (SGPT)  16 U/L  Low  17-63  

 

 Ast (Sgot)  24 U/L    12-42  

 

 eGFR Non-  72.3    > 60  

 

 eGFR   92.9    > 60  59









 Lipid Profile (Trig/Chol/HDL)  2011    Triglyceride  99 mg/dL      









 Cholesterol  141 mg/dL    Less Than 200  60

 

 High Density Lipoprotein  50 mg/dL    40-60  61

 

 Cholesterol/HDL Ratio  2.82 AVERAGE    1-4.97  

 

 Low Density Lipoprotein  71 mg/dL    Less Than 100  62









 CBC Auto Diff  2011    White Blood Count  5.2 CUMM    4.8-10.8  









 Red Cell Count  4.21 CUMM  Low  4.6-6.2  

 

 Hemoglobin  13.4 g/dL  Low  14.0-18.0  

 

 Hematocrit  39 %  Low  42-52  

 

 Mean Corpuscular Volume  92 um3    80-94  

 

 Mean Corpuscular Hemoglob  32 pg  High  27-31  

 

 Mean Corpuscular HGB Cone  35 g/dL    32-36  

 

 Redcell Distribution WDTH  14 %    10.5-15  

 

 Platelet Count  209 CUMM    150-450  

 

 Mean Platelet Volume  9.1 um3    7.4-10.4  

 

 Gran %  60.8 %    38-83  

 

 Lymph %  23.7 %  Low  25-47  

 

 Mononuclear %  10.9 %  High  1-9  

 

 Eosinophil %  4.0 %    0-6  

 

 Basophil %  0.6 %    0-2  

 

 Abs Lymphs  1.2    1.0-4.8  

 

 Abs Mononuclear  0.6    0-0.8  

 

 Absolute Neutrophil Count  3.1    1.5-7.7  

 

 Abs Eosinophils  0.2    0-0.6  

 

 Abs Basophils  0    0-0.2  









 Laboratory test  2011  [facility]  PSA,Diagnostic  0.83 NG/ML
    0-4  63



 finding    [address] [place], [place] [id number] [phone]          

 

 Laboratory test  10/29/2010  [facility]  Release Date  11/01/10    
  64



 finding    [address] [place] [address] [phone]          

 

 RBC Leukoreduced  10/29/2010  [facility]  RBC Leukoreduced  
[id number]      65



     [address]  [facility]        O <SEE      



     [place] [address]    NOTE>      



     [phone]          









 RBC Leukoreduced  [id number]      O <SEE NOTE>      66

 

 Patient Blood Type  O POSITIVE      

 

 Antibody Screen  NEGATIVE      









 Comp Metabolic Panel  2010  [facility]  Sodium  139 mmol/L    
135-145  



     [address] [place] [address] [phone]          









 Potassium  3.8 mmol/L    3.5-5.0  

 

 Chloride  102 mmol/L    101-111  

 

 Co2 (Carbon Dioxide)  29.0 mmol/L    22-32  

 

 Anion Gap  8.0 mmol/L    2-11  67

 

 Glucose  85 mg/dL      68

 

 BUN  18 mg/dL    6-24  

 

 Creatinine  1.00 mg/dL    0.50-1.40  

 

 One Over Creatinine  1.00      

 

 BUN/Creatinine Ratio  18.0    8-20  

 

 Calcium  9.5 mg/dL    8.1-9.9  69

 

 Total Protein  7.2 GM/DL    6.2-8.1  

 

 Albumin  4.0 GM/DL    3.2-5.2  

 

 Globulin  3.2 GM/DL    2-4  

 

 Albumin/Globulin Ratio  1.3    1-3  

 

 Bilirubin Total  0.9 mg/dL    0.4-1.5  70

 

 Alkaline Phosphatase  67 U/L      

 

 Alt (SGPT)  18 U/L    17-63  

 

 Ast (Sgot)  28 U/L    12-42  

 

 eGFR Non-  77.0    > 60  

 

 eGFR   93.2    > 60  71









 DR Montnaez's Lab  2010  [facility]  TSH  0.92 MIU/ML    0.34-
5.60  



 Panel    [address]          



     [place] [address] [phone]          

 

 CBC With  2010  [facility]  White Blood  6.1 CUMM    4.8-10.8
  



 Electronic Diff    [address]  Count        



     [place] [address] [phone]          









 Red Cell Count  4.51 CUMM  Low  4.6-6.2  

 

 Hemoglobin  14.3 g/dL    14.0-18.0  

 

 Hematocrit  42 %    42-52  

 

 Mean Corpuscular Volume  93 um3    80-94  

 

 Mean Corpuscular Hemoglob  32 pg  High  27-31  

 

 Mean Corpuscular HGB Cone  34 g/dL    32-36  

 

 Redcell Distribution WDTH  14 %    10.5-15  

 

 Platelet Count  217 CUMM    150-450  

 

 Mean Platelet Volume  8.2 um3    7.4-10.4  

 

 Gran %  66.3 %    38-83  

 

 Lymph %  18.9 %  Low  25-47  

 

 Mononuclear %  11.4 %  High  1-9  

 

 Eosinophil %  2.8 %    0-6  

 

 Basophil %  0.6 %    0-2  

 

 Abs Lymphs  1.2    1.0-4.8  

 

 Abs Mononuclear  0.7    0-0.8  

 

 Absolute Neutrophil Count  4.0    1.5-7.7  

 

 Abs Eosinophils  0.2    0-0.6  

 

 Abs Basophils  0    0-0.2  72









 Lipid Profile  2010  [facility]  Triglyceride  121 mg/dL    40
-200  



 (Trig/Chol/HDL)    [address]          



     [place] [address] [phone]          









 Cholesterol  163 mg/dL    Less Than 200  73

 

 High Density Lipoprotein  52 mg/dL    40-60  74

 

 Cholesterol/HDL Ratio  3.13 AVERAGE    1-4.97  

 

 Low Density Lipoprotein  87 mg/dL    Less Than 100  75









 Laboratory test  2010  [facility]  PSA,Diagnostic  0.57 NG/ML
    0-4  76



 finding    [address]          



     [place] [address] [phone]          

 

 Lipid Profile  2009  [facility]  Triglyceride  73 mg/dL    40-
200  77



 (Trig/Chol/HDL)    [address]          



     [place] [address] [phone]          









 Cholesterol  153 mg/dL    Less Than 200  78

 

 High Density Lipoprotein  52 mg/dL    40-60  79

 

 Cholesterol/HDL Ratio  2.94 AVERAGE    1-4.97  

 

 Low Density Lipoprotein  86 mg/dL    Less Than 100  80









 Comp Metabolic Panel  2009  [facility]  Sodium  138 mmol/L    
135-145  



     [address]          



     [place] [address] [phone]          









 Potassium  4.0 mmol/L    3.5-5.0  

 

 Chloride  106 mmol/L    101-111  

 

 Co2 (Carbon Dioxide)  30.0 mmol/L    22-32  

 

 Anion Gap  2.0 mmol/L    2-11  81

 

 Glucose  102 mg/dL  High    82

 

 BUN  22 mg/dL    6-24  

 

 Creatinine  1.00 mg/dL    0.50-1.40  

 

 One Over Creatinine  1.00      

 

 BUN/Creatinine Ratio  22.0  High  8-20  

 

 Calcium  9.1 mg/dL    8.1-9.9  83

 

 Total Protein  6.2 GM/DL    6.2-8.1  

 

 Albumin  3.5 GM/DL    3.2-5.2  

 

 Globulin  2.7 GM/DL    2-4  

 

 Albumin/Globulin Ratio  1.3    1-3  

 

 Bilirubin Total  0.9 mg/dL    0.4-1.5  84

 

 Alkaline Phosphatase  64 U/L      

 

 Alt (SGPT)  22 U/L    17-63  

 

 Ast (Sgot)  28 U/L    12-42  

 

 eGFR Non-  77.2    > 60  

 

 eGFR   93.4    > 60  85









 CBC With  2009  [facility]  White Blood  6.4 CUMM    4.8-10.8
  



 Electronic Diff    [address]  Count        



     [place] [address] [phone]          









 Red Cell Count  4.12 CUMM  Low  4.6-6.2  

 

 Hemoglobin  12.9 g/dL  Low  14.0-18.0  

 

 Hematocrit  39 %  Low  42-52  

 

 Mean Corpuscular Volume  93 um3    80-94  

 

 Mean Corpuscular Hemoglob  31 pg    27-31  

 

 Mean Corpuscular HGB Cone  33 g/dL    32-36  

 

 Redcell Distribution WDTH  13 %    10.5-15  

 

 Platelet Count  273 CUMM    150-450  

 

 Mean Platelet Volume  8.1 um3    7.4-10.4  

 

 Gran %  69.8 %    38-83  

 

 Lymph %  15.6 %  Low  25-47  

 

 Mononuclear %  10.4 %  High  1-9  

 

 Eosinophil %  3.6 %    0-6  

 

 Basophil %  0.6 %    0-2  

 

 Abs Lymphs  1.0    1.0-4.8  

 

 Abs Mononuclear  0.7    0-0.8  

 

 Absolute Neutrophil Count  4.4    1.5-7.7  

 

 Abs Eosinophils  0.2    0-0.6  

 

 Abs Basophils  0    0-0.2  86









 Laboratory test  2009  [facility]  TSH  0.61 MIU/ML    0.34-
5.60  



 finding    [address] [place] [address] [phone]          

 

 Laboratory test  2009  [facility]  PSA,Diagnost  0.40 NG/ML  
  0-4  87



 finding    [address]  [place] [address] [phone]          

 

 Laboratory test  2008  [facility]  PSA,Diagnost  0.35 NG/ML  
  0-4  88



 finding    [address] [place] [address] [phone]          

 

 Comp Metabolic  2008  [facility]  Sodium  141 mmol/L    135-
145  89



 Panel    [address] [place] [address] [phone]          









 Potassium  3.7 mmol/L    3.5-5.0  

 

 Chloride  107 mmol/L    101-111  

 

 Co2 (Carbon Dioxide)  31.0 mmol/L    22-32  

 

 Anion Gap  3.0 mmol/L    2-11  90

 

 Glucose  92 mg/dL      

 

 BUN  15 mg/dL    6-24  

 

 Creatinine  1.1 mg/dL    0.5-1.4  

 

 One Over Creatinine  0.90      

 

 BUN/Creatinine Ratio  13.6    8-20  

 

 Calcium  9.3 mg/dL    8.1-9.9  91

 

 Total Protein  7.0 GM/DL    6.2-8.1  

 

 Albumin  3.7 GM/DL    3.2-5.2  

 

 Globulin  3.3 GM/DL    2-4  

 

 Albumin/Globulin Ratio  1.1    1-3  

 

 Bilirubin Total  0.8 mg/dL    0.4-1.5  

 

 Alkaline Phosphatase  67 U/L      

 

 Alt (SGPT)  21 U/L    17-63  

 

 Ast (Sgot)  32 U/L    12-42  









 Lipid Profile  2008  [facility]  Triglyceride  88 mg/dL    40-
200  



 (Trig/Chol/HDL)    [address]          



     [place] [address] [phone]          









 Cholesterol  186 mg/dL    Less Than 200  92

 

 High Density Lipoprotein  52 mg/dL    40-60  93

 

 Cholesterol/HDL Ratio  3.58 AVERAGE    1-4.97  

 

 Low Density Lipoprotein  116 mg/dL  High  Less Than 100  94









 Laboratory test  2008  [facility]  TSH  0.44 MIU/ML    0.34-
5.60  



 finding    [address]          



     [place] [address] [phone]          

 

 CBC With  2008  [facility]  White Blood  4.9 CUMM    4.8-10.8
  



 Electronic Diff    [address]  Count        



     [place] [address] [phone]          









 Red Cell Count  4.32 CUMM  Low  4.6-6.2  

 

 Hemoglobin  13.2 g/dL  Low  14.0-18.0  

 

 Hematocrit  39 %  Low  42-52  

 

 Mean Corpuscular Volume  90 um3    80-94  

 

 Mean Corpuscular Hemoglob  31 pg    27-31  

 

 Mean Corpuscular HGB Cone  34 g/dL    32-36  

 

 Redcell Distribution WDTH  14 %    10.5-15  

 

 Platelet Count  240 CUMM    150-450  

 

 Mean Platelet Volume  8.8 um3    7.4-10.4  

 

 Gran %  63.8 %    38-83  

 

 Lymph %  20.2 %    20-45  

 

 Mononuclear %  12.3 %  High  1-9  

 

 Eosinophil %  2.9 %    0-6  

 

 Basophil %  0.8 %    0-2  

 

 Abs Lymphs  1.0    1.0-4.8  

 

 Abs Mononuclear  0.6    0-0.8  

 

 Absolute Neutrophil Count  3.1    1.5-7.7  

 

 Abs Eosinophils  0.1    0-0.6  

 

 Abs Basophils  0    0-0.2  









 Laboratory test  2008  [facility]  PSA Screening  0.24 NG/ML 
   0-4  95



 finding    [address]          



     [place] [address] [phone]          









 1  Troponin-I testing on Plasma Separator Tubes (PST) has a



   known false positive rate of 0.20-0.40%.  All positive



   troponins reflex immediate secondary confirmatory testing.

 

 2  *******Because ethnic data is not always readily available,



   this report includes an eGFR for both -Americans and



   non- Americans.****



   The National Kidney Disease Education Program (NKDEP) does



   not endorse the use of the MDRD equation for patients that



   are not between the ages of 18 and 70, are pregnant, have



   extremes of body size, muscle mass, or nutritional status,



   or are non- or non-.



   According to the National Kidney Foundation, irrespective of



   diagnosis, the stage of the disease is based on the level of



   kidney function:



   Stage Description                      GFR(mL/min/1.73 m(2))



   1     Kidney damage with normal or decreased GFR       90



   2     Kidney damage with mild decrease in GFR          60-89



   3     Moderate decrease in GFR                         30-59



   4     Severe decrease in GFR                           15-29



   5     Kidney failure                       <15 (or dialysis)

 

 3  [facility] Severe Sepsis and Septic Shock Management Bundle Measure



   requires all lactic acids initially measuring >2.0 mmol/L be



   repeated.

 

 4  [id number]

 

 5  SEE RESULT BELOW



   -----------------------------------------------------------------------------
---------------



   Name:  ALBINA DENIS               : 1933    Attend Dr: Vitaliy Montanez III, MD



   Acct:  [id number]  Unit: [id number]  AGE: 85            Location:  [facility]



   Re18                        SEX: M             Status:    REG REF



   -----------------------------------------------------------------------------
---------------



   



   SPEC: [id number]         NATALIO:       [facility] DR: Vitaliy Montanez III, MD



   REQ:  [id number]              RECD:   



   STATUS: COMP             OTHR DR: Earl



   _



   SOURCE: URINE          SPDESC:



   ORDERED:  Urine Culture



   COMMENTS: [id number]



   QUERIES:  Urine Source: Random



   



   -----------------------------------------------------------------------------
---------------



   Procedure                         Result                         Reported   
        Site



   -----------------------------------------------------------------------------
---------------



   Urine Culture  Final                                             18-
1414      ML



   



   Organism 1                     PROTEUS MIRABILIS



   Cookson Count                10-25,000 (Moderate) CFU/ML



   



   1. PROTEUS MIRABILIS



   M.I.C.      RX



   --------- ------



   Ampicillin                     <=2         S



   Cefazolin                      8           S



   Cefepime                       <=1         S



   Ceftriaxone                    <=1         S



   Ciprofloxacin                  <=0.25      S



   Gentamicin                     <=1         S



   Levofloxacin                   <=0.12      S



   Meropenem                      0.5         S



   Nitrofurantoin                 128         R



   Tetracycline                   >=16        R



   Pipercillin/Tazobactam         <=4         S



   Trimethoprim/Sulfamethoxazole  <=20        S



   Amoxicillin/Clavulanic Acid    <=2         S



   Aztreonam                      <=1         S



   



   



   Contact the Microbiology Department for any additional



   antibiotic reporting.



   



   -----------------------------------------------------------------------------
---------------



   * ML - Main Lab



   .



   



   ** END OF REPORT **



   



   DEPARTMENT OF PATHOLOGY,  [address] [place] [address]



   Phone # [phone]      Fax #[phone]



   Andrea Jerome M.D. Director     [facility] # [id number]

 

 6  >100 to <200 pg/mL: likely compensated congestive heart



   failure (CHF)



   200 to 400 pg/mL: likely moderate CHF



   >400 pg/mL: likely moderate to severe CHF

 

 7  *******Because ethnic data is not always readily available,



   this report includes an eGFR for both -Americans and



   non- Americans.****



   The National Kidney Disease Education Program (NKDEP) does



   not endorse the use of the MDRD equation for patients that



   are not between the ages of 18 and 70, are pregnant, have



   extremes of body size, muscle mass, or nutritional status,



   or are non- or non-.



   According to the National Kidney Foundation, irrespective of



   diagnosis, the stage of the disease is based on the level of



   kidney function:



   Stage Description                      GFR(mL/min/1.73 m(2))



   1     Kidney damage with normal or decreased GFR       90



   2     Kidney damage with mild decrease in GFR          60-89



   3     Moderate decrease in GFR                         30-59



   4     Severe decrease in GFR                           15-29



   5     Kidney failure                       <15 (or dialysis)

 

 8  Therapeutic concentration: <50 ug/mL



   Toxic concentration: >120 ug/mL

 

 9  The urine specimen was tested at the listed cutoffs:



   Drug class                       test level



   (ng/mL)



   Amphetamines                        500



   Barbiturates                        200



   Benzodiazepine metabolites          200



   Cocaine metabolites                 150



   Cannabinoids                         50



   Opiates                             300



   Pcp                                  25



   



   Specimen was received without chain of custody. Results



   should be used for medical purposes only.

 

 10  *Ascorbic acid is present which may interfere with detection



   of blood.

 

 11  [id number]

 

 12  SEE RESULT BELOW



   -----------------------------------------------------------------------------
---------------



   Name:  ALBINA DENIS               : 1933    Attend Dr: Vitaliy Montanez III, MD



   Acct:  [id number]  Unit: [id number]  AGE: 85            Location:  [facility]



   Re18                        SEX: M             Status:    REG REF



   -----------------------------------------------------------------------------
---------------



   



   SPEC: [id number]         NATALIO:   18-    SUBM DR: Vitaliy Montanez III, MD



   REQ:  [id number]              RECD:   



   STATUS: COMP



   _



   SOURCE: URINE          SPDESC:



   ORDERED:  Urine Culture



   COMMENTS: [id number]



   



   -----------------------------------------------------------------------------
---------------



   Procedure                         Result                         Reported   
        Site



   -----------------------------------------------------------------------------
---------------



   Urine Culture  Final                                             18-
0945      ML



   



   Organism 1                     AEROCOCCUS URINAE



   Cookson Count                ,000 (Many) CFU/ML



   Organism 2                     NORMAL IVIS



   Cookson Count                1-10,000 (Few) CFU/ML



   



   Aerococcus isolates are too fastidious for routine



   susceptibility studies.  Aerococcus are usually susceptible



   to penicillin, amoxicillin, piperacillin, cefipime, rifampin



   and vancomycin.  Moderate to good activity occurs with the



   quinolones, tetracyclines and erythromycin.  (Meghna's



   Color [place] and Textbook of Diagnostic Microbiology 6th Ed.



   2006, p. 705-6.)



   



   -----------------------------------------------------------------------------
---------------



   * ML - Main Lab



   .



   



   



   



   



   



   



   



   



   



   



   



   



   



   



   ** END OF REPORT **



   



   DEPARTMENT OF PATHOLOGY,  [address] [place] [address]



   Phone # [phone]      Fax #[phone]



   Andrea Jerome M.D. Director     [facility] # [id number]

 

 13  [id number]

 

 14  SEE RESULT BELOW



   -----------------------------------------------------------------------------
---------------



   Name:  ALBINA DENIS               : 1933    Attend Dr: Vitaliy Montanez III, MD



   Acct:  [id number]  Unit: [id number]  AGE: 85            Location:  [facility]



   Re18                        SEX: M             Status:    REG REF



   -----------------------------------------------------------------------------
---------------



   



   SPEC: [id number]         NATALIO:       [facility] DR: Vitaliy Montanez III, MD



   REQ:  [id number]              RECD:   



   STATUS: COMP



   _



   SOURCE: URINE          [facility]:



   ORDERED:  Urine Culture



   COMMENTS: [id number]



   Urine Source: Random



   



   -----------------------------------------------------------------------------
---------------



   Procedure                         Result                         Reported   
        Site



   -----------------------------------------------------------------------------
---------------



   Urine Culture  Final                                             18-
1404      ML



   



   Organism 1                     AEROCOCCUS URINAE



   Cookson Count                25-50,000 (Moderate) CFU/ML



   Organism 2                     NORMAL IVIS



   Cookson Count                10-25,000 (Moderate) CFU/ML



   



   Aerococcus isolates are too fastidious for routine



   susceptibility studies.  Aerococcus are usually susceptible



   to penicillin, amoxicillin, piperacillin, cefipime, rifampin



   and vancomycin.  Moderate to good activity occurs with the



   quinolones, tetracyclines and erythromycin.  (Meghna's



   Color [place] and Textbook of Diagnostic Microbiology 6th Ed.



   2006, p. 705-6.)



   



   -----------------------------------------------------------------------------
---------------



   * ML - Main Lab



   .



   



   



   



   



   



   



   



   



   



   



   



   



   ** END OF REPORT **



   



   DEPARTMENT OF PATHOLOGY,  [address] [place] [address]



   Phone # [phone]      Fax #[phone]



   Andrea Jerome M.D. Director     [facility] # [id number]

 

 15  [id number]

 

 16  Desirable: <150



   Borderline High: 150-199



   High: 200-499



   Very High: >500

 

 17  Desirable: <200



   Borderline High: 200-239



   High: >239

 

 18  Low: <40



   Desirable: 40-60



   High: >60

 

 19  Desirable: <100



   Near Optimal: 100-129



   Borderline High: 130-159



   High: 160-189



   Very High: >189

 

 20  *******Because ethnic data is not always readily available,



   this report includes an eGFR for both -Americans and



   non- Americans.****



   The National Kidney Disease Education Program (NKDEP) does



   not endorse the use of the MDRD equation for patients that



   are not between the ages of 18 and 70, are pregnant, have



   extremes of body size, muscle mass, or nutritional status,



   or are non- or non-.



   According to the National Kidney Foundation, irrespective of



   diagnosis, the stage of the disease is based on the level of



   kidney function:



   Stage Description                      GFR(mL/min/1.73 m(2))



   1     Kidney damage with normal or decreased GFR       90



   2     Kidney damage with mild decrease in GFR          60-89



   3     Moderate decrease in GFR                         30-59



   4     Severe decrease in GFR                           15-29



   5     Kidney failure                       <15 (or dialysis)

 

 21  : [id number]

 

 22  *******Because ethnic data is not always readily available,



   this report includes an eGFR for both -Americans and



   non- Americans.****



   The National Kidney Disease Education Program (NKDEP) does



   not endorse the use of the MDRD equation for patients that



   are not between the ages of 18 and 70, are pregnant, have



   extremes of body size, muscle mass, or nutritional status,



   or are non- or non-.



   According to the National Kidney Foundation, irrespective of



   diagnosis, the stage of the disease is based on the level of



   kidney function:



   Stage Description                      GFR(mL/min/1.73 m(2))



   1     Kidney damage with normal or decreased GFR       90



   2     Kidney damage with mild decrease in GFR          60-89



   3     Moderate decrease in GFR                         30-59



   4     Severe decrease in GFR                           15-29



   5     Kidney failure                       <15 (or dialysis)

 

 23  *******Because ethnic data is not always readily available,



   this report includes an eGFR for both -Americans and



   non- Americans.****



   The National Kidney Disease Education Program (NKDEP) does



   not endorse the use of the MDRD equation for patients that



   are not between the ages of 18 and 70, are pregnant, have



   extremes of body size, muscle mass, or nutritional status,



   or are non- or non-.



   According to the National Kidney Foundation, irrespective of



   diagnosis, the stage of the disease is based on the level of



   kidney function:



   Stage Description                      GFR(mL/min/1.73 m(2))



   1     Kidney damage with normal or decreased GFR       90



   2     Kidney damage with mild decrease in GFR          60-89



   3     Moderate decrease in GFR                         30-59



   4     Severe decrease in GFR                           15-29



   5     Kidney failure                       <15 (or dialysis)

 

 24  [id number]

 

 25  Desirable <150



   Borderline high 150-199



   High 200-499



   Very High >500

 

 26  Desirable <200



   Borderline high 200-239



   High >239

 

 27  Low <40



   Desirable: 40-60



   High: >60

 

 28  Desirable: <100 mg/dL



   Near Optimal: 100-129 mg/dL



   Borderline High: 130-159 mg/dL



   High: 160-189 mg/dL



   Very High: >189 mg/dL

 

 29  *******Because ethnic data is not always readily available,



   this report includes an eGFR for both -Americans and



   non- Americans.****



   The National Kidney Disease Education Program (NKDEP) does



   not endorse the use of the MDRD equation for patients that



   are not between the ages of 18 and 70, are pregnant, have



   extremes of body size, muscle mass, or nutritional status,



   or are non- or non-.



   According to the National Kidney Foundation, irrespective of



   diagnosis, the stage of the disease is based on the level of



   kidney function:



   Stage Description                      GFR(mL/min/1.73 m(2))



   1     Kidney damage with normal or decreased GFR       90



   2     Kidney damage with mild decrease in GFR          60-89



   3     Moderate decrease in GFR                         30-59



   4     Severe decrease in GFR                           15-29



   5     Kidney failure                       <15 (or dialysis)

 

 30  [id number]

 

 31  SEE RESULT BELOW



   -----------------------------------------------------------------------------
---------------



   Name:  ALBINA DENIS               : 1933    Attend Dr: Vitaliy Montanez III, MD



   Acct:  [id number]  Unit: [id number]  AGE: 84            Location:  [facility]



   Re17                        SEX: M             Status:    REG REF



   -----------------------------------------------------------------------------
---------------



   



   SPEC: [id number]         NATALIO:   17-    [facility] DR: Vitaliy Montanez III, MD



   REQ:  [id number]              RECD:   



   STATUS: COMP



   _



   SOURCE: URINE          SPDESC:



   ORDERED:  Urine Culture



   COMMENTS: [id number]



   



   -----------------------------------------------------------------------------
---------------



   Procedure                         Result                         Reported   
        Site



   -----------------------------------------------------------------------------
---------------



   Urine Culture  Final                                             17-
1329      ML



   No Growth (<1,000 CFU/mL)



   



   -----------------------------------------------------------------------------
---------------



   * ML - MAIN LAB ([facility])



   .



   



   



   



   



   



   



   



   



   



   



   



   



   



   



   



   



   



   



   



   



   



   



   



   



   



   



   ** END OF REPORT **



   



   * ML=Testing performed at Main Lab



   DEPARTMENT OF PATHOLOGY,  [address] [place] [address]



   Phone # [phone]      Fax #[phone]



   Andrea Jerome M.D. Director     [facility] # [id number]

 

 32  Acute inflammation:  >10.00

 

 33  Desirable <150



   Borderline high 150-199



   High 200-499



   Very High >500

 

 34  Desirable <200



   Borderline high 200-239



   High >239

 

 35  Low <40



   Desirable: 40-60



   High: >60

 

 36  Desirable: <100 mg/dL



   Near Optimal: 100-129 mg/dL



   Borderline High: 130-159 mg/dL



   High: 160-189 mg/dL



   Very High: >189 mg/dL

 

 37  *******Because ethnic data is not always readily available,



   this report includes an eGFR for both -Americans and



   non- Americans.****



   The National Kidney Disease Education Program (NKDEP) does



   not endorse the use of the MDRD equation for patients that



   are not between the ages of 18 and 70, are pregnant, have



   extremes of body size, muscle mass, or nutritional status,



   or are non- or non-.



   According to the National Kidney Foundation, irrespective of



   diagnosis, the stage of the disease is based on the level of



   kidney function:



   Stage Description                      GFR(mL/min/1.73 m(2))



   1     Kidney damage with normal or decreased GFR       90



   2     Kidney damage with mild decrease in GFR          60-89



   3     Moderate decrease in GFR                         30-59



   4     Severe decrease in GFR                           15-29



   5     Kidney failure                       <15 (or dialysis)

 

 38  Serum levels of PSA measured using the [employer] SmartAsset



   DXI Hybritech immunoassay should not be interpreted as



   absolute evidence of the presence or absence of disease.



   The PSA value should be used in conjunction with other



   pertinent clinical diagnostic procedures.



   



   The values obtained with different assay methods or kits



   cannot be used interchangeably.

 

 39  Desirable <150



   Borderline high 150-199



   High 200-499



   Very High >500

 

 40  Desirable <200



   Borderline high 200-239



   High >239

 

 41  Low <40



   Desirable: 40-60



   High: >60

 

 42  Desirable <100



   Near Optimal 100-129



   Borderline high 130-159



   High 160-189



   Very High >189

 

 43  *******Because ethnic data is not always readily available,



   this report includes an eGFR for both -Americans and



   non- Americans.****



   The National Kidney Disease Education Program (NKDEP) does



   not endorse the use of the MDRD equation for patients that



   are not between the ages of 18 and 70, are pregnant, have



   extremes of body size, muscle mass, or nutritional status,



   or are non- or non-.



   According to the National Kidney Foundation, irrespective of



   diagnosis, the stage of the disease is based on the level of



   kidney function:



   Stage Description                      GFR(mL/min/1.73 m(2))



   1     Kidney damage with normal or decreased GFR       90



   2     Kidney damage with mild decrease in GFR          60-89



   3     Moderate decrease in GFR                         30-59



   4     Severe decrease in GFR                           15-29



   5     Kidney failure                       <15 (or dialysis)

 

 44  Serum levels of PSA measured using the [employer]



   DXI Hybritech immunoassay should not be interpreted as



   absolute evidence of the presence or absence of disease.



   The PSA value should be used in conjunction with other



   pertinent clinical diagnostic procedures.



   



   The values obtained with different assay methods or kits



   cannot be used interchangeably.

 

 45  *******Because ethnic data is not always readily available,



   this report includes an eGFR for both -Americans and



   non- Americans.****



   The National Kidney Disease Education Program (NKDEP) does



   not endorse the use of the MDRD equation for patients that



   are not between the ages of 18 and 70, are pregnant, have



   extremes of body size, muscle mass, or nutritional status,



   or are non- or non-.



   According to the National Kidney Foundation, irrespective of



   diagnosis, the stage of the disease is based on the level of



   kidney function:



   Stage Description                      GFR(mL/min/1.73 m(2))



   1     Kidney damage with normal or decreased GFR       90



   2     Kidney damage with mild decrease in GFR          60-89



   3     Moderate decrease in GFR                         30-59



   4     Severe decrease in GFR                           15-29



   5     Kidney failure                       <15 (or dialysis)

 

 46  *******Because ethnic data is not always readily available,



   this report includes an eGFR for both -Americans and



   non- Americans.****



   The National Kidney Disease Education Program (NKDEP) does



   not endorse the use of the MDRD equation for patients that



   are not between the ages of 18 and 70, are pregnant, have



   extremes of body size, muscle mass, or nutritional status,



   or are non- or non-.



   According to the National Kidney Foundation, irrespective of



   diagnosis, the stage of the disease is based on the level of



   kidney function:



   Stage Description                      GFR(mL/min/1.73 m(2))



   1     Kidney damage with normal or decreased GFR       90



   2     Kidney damage with mild decrease in GFR          60-89



   3     Moderate decrease in GFR                         30-59



   4     Severe decrease in GFR                           15-29



   5     Kidney failure                       <15 (or dialysis)

 

 47  *******Because ethnic data is not always readily available,



   this report includes an eGFR for both -Americans and



   non- Americans.****



   The National Kidney Disease Education Program (NKDEP) does



   not endorse the use of the MDRD equation for patients that



   are not between the ages of 18 and 70, are pregnant, have



   extremes of body size, muscle mass, or nutritional status,



   or are non- or non-.



   According to the National Kidney Foundation, irrespective of



   diagnosis, the stage of the disease is based on the level of



   kidney function:



   Stage Description                      GFR(mL/min/1.73 m(2))



   1     Kidney damage with normal or decreased GFR       90



   2     Kidney damage with mild decrease in GFR          60-89



   3     Moderate decrease in GFR                         30-59



   4     Severe decrease in GFR                           15-29



   5     Kidney failure                       <15 (or dialysis)

 

 48  HDL Interpretation:



   Undesirable: High Risk:  Less than 40 mg/dL



   Desirable:  Low Risk:  Greater than 60 mg/dL

 

 49  LDL Interpretation:



   Low Risk Optimal Level:  LDL Less than 100 mg/dL



   Near or Above Optimal:  -129 mg/dL



   Borderline High Risk:  -159 mg/dL



   High Risk:  -189 mg/dL



   Very High Risk:  LDL Greater than 189 mg/dL

 

 50  Serum levels of PSA measured using the [employer] Smart[employer]



   DXI Hybritech immunoassay should not be interpreted as



   absolute evidence of the presence or absence of disease.



   The PSA value should be used in conjunction with other



   pertinent clinical diagnostic procedures.



   



   The values obtained with different assay methods or kits



   cannot be used interchangeably.

 

 51  A metabolite of Naproxen, O-desmethylnaproxen, has been



   shown to interfere with the Jendrassik-Silver Peak method for



   measuring total bilirubin.  Samples from patients who have



   taken Naproxen have shown spurious elevation in total



   bilirubin levels.

 

 52  *******Because ethnic data is not always readily available,



   this report includes an eGFR for both -Americans and



   non- Americans.****



   The National Kidney Disease Education Program (NKDEP) does



   not endorse the use of the MDRD equation for patients that



   are not between the ages of 18 and 70, are pregnant, have



   extremes of body size, muscle mass, or nutritional status,



   or are non- or non-.



   According to the National Kidney Foundation, irrespective of



   diagnosis, the stage of the disease is based on the level of



   kidney function:



   Stage Description                      GFR(mL/min/1.73 m(2))



   1     Kidney damage with normal or decreased GFR       90



   2     Kidney damage with mild decrease in GFR          60-89



   3     Moderate decrease in GFR                         30-59



   4     Severe decrease in GFR                           15-29



   5     Kidney failure                       <15 (or dialysis)

 

 53  Desirable:  Less than 200 MG/DL



   Borderline-High Risk:  200-239 MG/DL



   High-Risk:  240 MG/DL and over

 

 54  HDL Interpretation:



   Undesirable: High Risk:  Less than 40 MG/DL



   Desirable:  Low Risk:  Greater than 60 MG/DL

 

 55  *



   SERUM LEVELS OF PSA MEASURED USING THE [employer] Moverati



   ACCESS HYBRITECH IMMUNOASSAY SHOULD NOT BE INTERPRETED AS



   ABSOLUTE EVIDENCE OF THE PRESENCE OR ABSENCE OF DISEASE.



   THE PSA VALUE SHOULD BE USED IN CONJUNCTION WITH OTHER



   PERTINENT CLINICAL DIAGNOSTIC PROCEDURES.



   



   The values obtained with different assay methods or kits



   cannot be used interchangeably.

 

 56  *



   SERUM LEVELS OF PSA MEASURED USING THE ANJELICA Moverati



   ACCESS HYBRITECH IMMUNOASSAY SHOULD NOT BE INTERPRETED AS



   ABSOLUTE EVIDENCE OF THE PRESENCE OR ABSENCE OF DISEASE.



   THE PSA VALUE SHOULD BE USED IN CONJUNCTION WITH OTHER



   PERTINENT CLINICAL DIAGNOSTIC PROCEDURES.

 

 57  Anion gap measurement may be of limited value in the



   presence of any alkalosis, especially in a combined acid



   base disorder.



   .

 

 58  A metabolite of Naproxen, O-desmethylnaproxen, has been



   shown to interfere with the Jendrassik-Silver Peak method for



   measuring total bilirubin.  Samples from patients who have



   taken Naproxen have shown spurious elevation in total



   bilirubin levels.

 

 59  *******Because ethnic data is not always readily available,



   this report includes an eGFR for both -Americans and



   non- Americans.****



   The National Kidney Disease Education Program (NKDEP) does



   not endorse the use of the MDRD equation for patients that



   are not between the ages of 18 and 70, are pregnant, have



   extremes of body size, muscle mass, or nutritional status,



   or are non- or non-.



   According to the National Kidney Foundation, irrespective of



   diagnosis, the stage of the disease is based on the level of



   kidney function:



   Stage Description                      GFR(mL/min/1.73 m(2))



   1     Kidney damage with normal or decreased GFR       90



   2     Kidney damage with mild decrease in GFR          60-89



   3     Moderate decrease in GFR                         30-59



   4     Severe decrease in GFR                           15-29



   5     Kidney failure                       <15 (or dialysis)

 

 60  CHOLESTEROL INTERPRETATION:



   Desirable:  Less than 200 MG/DL



   Borderline-High Risk:  200-239 MG/DL



   High-Risk:  240 MG/DL and over

 

 61  HDL INTERPRETATION:



   Undesirable: High Risk:  Less than 40 MG/DL



   Desirable:  Low Risk:  Greater than 60 MG/DL

 

 62  LDL INTERPRETATION:



   Low Risk Optimal Level:  LDL Less than 100 MG/DL



   Near or Above Optimal:  -129 MG/DL



   Borderline High Risk:  -159 MG/DL



   High Risk:  -189 MG/DL



   Very High Risk:  LDL Greater than 189 MG/DL

 

 63  *



   SERUM LEVELS OF PSA MEASURED USING THE [employer]ICA Moverati



   ACCESS HYBRITECH IMMUNOASSAY SHOULD NOT BE INTERPRETED AS



   ABSOLUTE EVIDENCE OF THE PRESENCE OR ABSENCE OF DISEASE.



   THE PSA VALUE SHOULD BE USED IN CONJUNCTION WITH OTHER



   PERTINENT CLINICAL DIAGNOSTIC PROCEDURES.

 

 64  ANY BLOOD NOT GIVEN WILL BE RELEASED AT 0700 ON THE



   ABOVE DATE UNLESS DOCTOR NOTIFIES LAB OTHERWISE.

 

 65  [id number]      ON        PC



   TRANSFUSED     10/29/10 0944

 

 66  [id number]      ON        PC



   TRANSFUSED     10/29/10 1224

 

 67  Anion gap measurement may be of limited value in the



   presence of any alkalosis, especially in a combined acid



   base disorder.



   .

 

 68  ** Note change in reference range as of 08.  The



   change was based on recommendations from the American



   Diabetes Association.

 

 69  Please note change in reference range effective 08



   .

 

 70  A metabolite of Naproxen, O-desmethylnaproxen, has been



   shown to interfere with the Jendrassik-Checo method for



   measuring total bilirubin.  Samples from patients who have



   taken Naproxen have shown spurious elevation in total



   bilirubin levels.

 

 71  *******Because ethnic data is not always readily available,



   this report includes an eGFR for both -Americans and



   non- Americans.****



   The National Kidney Disease Education Program (NKDEP) does



   not endorse the use of the MDRD equation for patients that



   are not between the ages of 18 and 70, are pregnant, have



   extremes of body size, muscle mass, or nutritional status,



   or are non- or non-.



   According to the National Kidney Foundation, irrespective of



   diagnosis, the stage of the disease is based on the level of



   kidney function:



   Stage Description                      GFR(mL/min/1.73 m(2))



   1     Kidney damage with normal or decreased GFR       90



   2     Kidney damage with mild decrease in GFR          60-89



   3     Moderate decrease in GFR                         30-59



   4     Severe decrease in GFR                           15-29



   5     Kidney failure                       <15 (or dialysis)

 

 72  Lymphopenia %

 

 73  CHOLESTEROL INTERPRETATION:



   Desirable:  Less than 200 MG/DL



   Borderline-High Risk:  200-239 MG/DL



   High-Risk:  240 MG/DL and over

 

 74  HDL INTERPRETATION:



   Undesirable: High Risk:  Less than 40 MG/DL



   Desirable:  Low Risk:  Greater than 60 MG/DL

 

 75  LDL INTERPRETATION:



   Low Risk Optimal Level:  LDL Less than 100 MG/DL



   Near or Above Optimal:  -129 MG/DL



   Borderline High Risk:  -159 MG/DL



   High Risk:  -189 MG/DL



   Very High Risk:  LDL Greater than 189 MG/DL

 

 76  *



   SERUM LEVELS OF PSA MEASURED USING THE [employer]ELICA Moverati



   ACCESS HYBRITECH IMMUNOASSAY SHOULD NOT BE INTERPRETED AS



   ABSOLUTE EVIDENCE OF THE PRESENCE OR ABSENCE OF DISEASE.



   THE PSA VALUE SHOULD BE USED IN CONJUNCTION WITH OTHER



   PERTINENT CLINICAL DIAGNOSTIC PROCEDURES.



   A PSA value in the range of 0.1 to 0.6 ng/ml is



   indeterminate if being used as an indicator of recurrent or



   residual disease.



   .

 

 77  FASTING

 

 78  CHOLESTEROL INTERPRETATION:



   Desirable:  Less than 200 MG/DL



   Borderline-High Risk:  200-239 MG/DL



   High-Risk:  240 MG/DL and over

 

 79  HDL INTERPRETATION:



   Undesirable: High Risk:  Less than 40 MG/DL



   Desirable:  Low Risk:  Greater than 60 MG/DL

 

 80  LDL INTERPRETATION:



   Low Risk Optimal Level:  LDL Less than 100 MG/DL



   Near or Above Optimal:  -129 MG/DL



   Borderline High Risk:  -159 MG/DL



   High Risk:  -189 MG/DL



   Very High Risk:  LDL Greater than 189 MG/DL

 

 81  Anion gap measurement may be of limited value in the



   presence of any alkalosis, especially in a combined acid



   base disorder.



   .

 

 82  ** Note change in reference range as of 08.  The



   change was based on recommendations from the American



   Diabetes Association.

 

 83  Please note change in reference range effective 08



   



   



   .

 

 84  A metabolite of Naproxen, O-desmethylnaproxen, has been



   shown to interfere with the Jendrassik-Checo method for



   measuring total bilirubin.  Samples from patients who have



   taken Naproxen have shown spurious elevation in total



   bilirubin levels.

 

 85  *******Because ethnic data is not always readily available,



   this report includes an eGFR for both -Americans and



   non- Americans.****



   The National Kidney Disease Education Program (NKDEP) does



   not endorse the use of the MDRD equation for patients that



   are not between the ages of 18 and 70, are pregnant, have



   extremes of body size, muscle mass, or nutritional status,



   or are non- or non-.



   According to the National Kidney Foundation, irrespective of



   diagnosis, the stage of the disease is based on the level of



   kidney function:



   Stage Description                      GFR(mL/min/1.73 m(2))



   1     Kidney damage with normal or decreased GFR       90



   2     Kidney damage with mild decrease in GFR          60-89



   3     Moderate decrease in GFR                         30-59



   4     Severe decrease in GFR                           15-29



   5     Kidney failure                       <15 (or dialysis)

 

 86  Lymphopenia %

 

 87  *



   SERUM LEVELS OF PSA MEASURED USING THE ANJELICA Moverati



   ACCESS HYBRITECH IMMUNOASSAY SHOULD NOT BE INTERPRETED AS



   ABSOLUTE EVIDENCE OF THE PRESENCE OR ABSENCE OF DISEASE.



   THE PSA VALUE SHOULD BE USED IN CONJUNCTION WITH OTHER



   PERTINENT CLINICAL DIAGNOSTIC PROCEDURES.



   



   A PSA value in the range of 0.1 to 0.6 ng/ml is



   indeterminate if being used as an indicator of recurrent or



   residual disease.



   .

 

 88  *



   SERUM LEVELS OF PSA MEASURED USING THE ANJELICA CAROL



   ACCESS HYBRITECH IMMUNOASSAY SHOULD NOT BE INTERPRETED AS



   ABSOLUTE EVIDENCE OF THE PRESENCE OR ABSENCE OF DISEASE.



   THE PSA VALUE SHOULD BE USED IN CONJUNCTION WITH OTHER



   PERTINENT CLINICAL DIAGNOSTIC PROCEDURES.



   



   A PSA value in the range of 0.1 to 0.6 ng/ml is



   indeterminate if being used as an indicator of recurrent or



   residual disease.



   .

 

 89  PATIENT MAY HAVE RESULTS PER DOCTOR'S AUTHORIZATION. Questions regarding 
this



   report should be directed to your doctor.

 

 90  Anion gap measurement may be of limited value in the



   presence of any alkalosis, especially in a combined acid



   base disorder.



   .

 

 91  Please note change in reference range effective 08



   



   



   .

 

 92  CHOLESTEROL INTERPRETATION:



   Desirable:  Less than 200 MG/DL



   Borderline-High Risk:  200-239 MG/DL



   High-Risk:  240 MG/DL and over

 

 93  HDL INTERPRETATION:



   Undesirable: High Risk:  Less than 40 MG/DL



   Desirable:  Low Risk:  Greater than 60 MG/DL

 

 94  LDL INTERPRETATION:



   Low Risk Optimal Level:  LDL Less than 100 MG/DL



   Near or Above Optimal:  -129 MG/DL



   Borderline High Risk:  -159 MG/DL



   High Risk:  -189 MG/DL



   Very High Risk:  LDL Greater than 189 MG/DL

 

 95  *



   SERUM LEVELS OF PSA MEASURED USING THE [email or web address]



   ACCESS HYBRITECH IMMUNOASSAY SHOULD NOT BE INTERPRETED AS



   ABSOLUTE EVIDENCE OF THE PRESENCE OR ABSENCE OF DISEASE.



   THE PSA VALUE SHOULD BE USED IN CONJUNCTION WITH OTHER



   PERTINENT CLINICAL DIAGNOSTIC PROCEDURES.



   



   A PSA value in the range of 0.1 to 0.6 ng/ml is



   indeterminate if being used as an indicator of recurrent or



   residual disease.



   .







Procedures







 Date  Code  Description  Status

 

 2014  31190  ECHO Transthoracic, Real-Time 2D With Doppler And Color  
Completed



     Flow  

 

 2014  54598  EKG Tracing & Interpretation  Completed

 

 2013  04701  Color Flow Doppler/Interp & Reprt  Completed

 

 2013  49993  Pulse Wave/Continuous-Interp.RPT  Completed

 

 2013  92264  ECHO Transthorasic Realtime 2D W Doppler & Color Flow  
Completed



     Hosp  

 

 10/10/2012  99007  ECHO Transthoracic, Real-Time 2D With Doppler And Color  
Completed



     Flow  

 

 11/10/2010  96043  Rad Exam; Knee, Ap&L  Completed

 

 10/26/2010  89087  TKR Total Knee Replacement  Completed

 

 10/26/2010  30517  TKR Total Knee Replacement  Completed

 

 10/05/2010  48875  EKG Tracing & Interpretation  Completed

 

 2010  99003  Xray Knee 3 Views  Completed

 

 2010  74084  Rad Exam; Knee, Ap&L  Completed

 

 2010  11525  ECHO Transthoracic, Real-Time 2D With Doppler And Color  
Completed



     Flow  

 

 2009  30751  EKG Tracing & Interpretation  Completed

 

 2008  76958  Color Doppler  Completed

 

 2008  73955  Color Doppler  Completed

 

 2008  40819  Pulse Doppler & Continuous Wave  Completed

 

 2008  86761  Pulse Doppler & Continuous Wave  Completed

 

 2008  63652  Pulse Doppler & Continuous Wave  Completed

 

 2008  13604  Echocardiogram  Completed

 

 2008  54719  Echocardiogram  Completed

 

 2007  16689  Color Doppler  Completed

 

 2007  20404  Color Doppler  Completed

 

 2007  21696  Pulse Doppler & Continuous Wave  Completed

 

 2007  72143  Echocardiogram  Completed

 

 2007  84115  Echocardiogram  Completed

 

 2007  [id number]  Colonoscopy  Completed







Encounters







 Type  Date  Location  Provider  Dx  Diagnosis

 

 Office Visit  2018  [facility]  Vitaliy RODRÍGUEZ  R60.0  Localized edema



   2:00p  Malu Montanez M.D.    



     [facility]      

 

 Office Visit  2018  Todd RODRÍGUEZ  R09.02  Hypoxemia



   10:20a  Malu Montanez M.D.    



     [facility]      

 

 Office Visit  11/10/2017  Todd RODRÍGUEZ  I10  Essential (primary)



   10:40a  Malu Montanez M.D.    hypertension



     [facility]      









 E78.00  Pure hypercholesterolemia, unspecified

 

 F03.90  Unspecified dementia without behavioral disturbance

 

 R09.02  Hypoxemia









 Office Visit  2016  2:20p  Todd RODRÍGUEZ  M79.672  Pain in 
left foot



     Malu Montanez M.D.    



     [facility]      

 

 Office Visit  2016 11:40a  Todd RODRÍGUEZ  M54.2  Cervicalgia



     Malu Montanez M.D.    



     [facility]      









 Z85.46  Personal history of malignant neoplasm of prostate









 Office Visit  2015  2:40p  Todd RODRÍGUEZ  680.2  Carbuncle &



     Malu Montanez M.D.    Furuncle Trunk



     [facility]      









 401.1  Hypertension Benign









 Office Visit  2014 10:00a  Todd RODRÍGUEZ  V70.0  Examination



     Malu Montanez M.D.    General Medical



     [facility]      Routine [facility]



           Care Facility









 290.0  Dementia Senile Uncomplicated

 

 401.1  Hypertension Benign

 

 424.1  Aortic Valve Disorder

 

 V74.1  Screening Examination Pulmonary Tuberculosis

 

 272.0  Hypercholesterolemia Pure

 

 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









 Office Visit  2014  1:20p  Todd RODRÍGUEZ  424.1  Aortic Valve



     Malu Montanez M.D.    Disorder



     Ck      









 401.1  Hypertension Benign









 Office Visit  2014  C[place] Qutaybeh SMartell  401.1  Hypertension



   1:40p  Cardiology  SREE Benito    Benign









 272.0  Hypercholesterolemia Pure

 

 424.1  Aortic Valve Disorder

 

 424.0  Mitral Valve Disorder

 

 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

 

 427.61  Premature Beats Supraventricular

 

 785.2  Murmur Cardiac Undiagnosed









 Office Visit  10/10/2013 10:00a  [facility]  Vitaliy RODRÍGUEZ  401.1  Hypertension 
[facility]



     SREE Petersen      









 272.0  Hypercholesterolemia Pure

 

 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

 

 424.1  Aortic Valve Disorder

 

 V04.81  Need For Prophylactic Vaccination & Inoculation/Influenza









 Office Visit  2012  9:40a  [facility]  Vitaliy RODRÍGUEZ  401.1  Hypertension 
[facility]



     SREE Petersen      









 424.1  Aortic Valve Disorder









 Office Visit  10/10/2012  9:00a  [facility]  Vitaliy RODRÍGUEZ  401.1  Hypertension 
[facility]



     SREE Petersen      









 272.0  Hypercholesterolemia Pure

 

 424.1  Aortic Valve Disorder

 

 715.96  Osteoarthrosis Unspec Genlzd Or Localized Lower Leg

 

 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









 Office Visit  2012  1:00p  [facility] Berenice RODRÍGUEZ  401.1  Hypertension 
SREE Matamoros      









 272.0  Hypercholesterolemia Pure

 

 424.1  Aortic Valve Disorder

 

 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









 Office Visit  10/05/2011  1:00p  DO Not Use Todd RODRÍGUEZ  V70.0  Examination



     AT Mary Montanez M.D.    General Medical



           Routine AT Health



           Care Facility









 401.1  Hypertension Benign

 

 272.0  Hypercholesterolemia Pure

 

 424.1  Aortic Valve Disorder

 

 715.96  Osteoarthrosis Unspec Genlzd Or Localized Lower Leg

 

 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

 

 V04.81  Need For Prophylactic Vaccination & Inoculation/Influenza









 Office Visit  10/03/2011  Orthopedic  Ashley Pereira  715.96  Osteoarthrosis



   10:45a  Services Of  RPA-C    Unspec Genlzd Or



     C.M.A.      Localized Lower Leg

 

 Office Visit  2011  Orthopedic  Norbert Dennis  715.96  Osteoarthrosis



   1:45p  Services Of  M.D.    Unspec Genlzd Or



     C.M.A.      Localized Lower Leg

 

 Office Visit  2011  DO Not Use Cma  Vitaliy E.  401.1  Hypertension Benign



   11:00a  AT Mary Montanez M.D.    









 272.0  Hypercholesterolemia Pure

 

 424.1  Aortic Valve Disorder









 Office Visit  10/05/2010 10:00a  DO Not Use Cma  Vitaliy E.  V72.83  Examination



     AT Mary Montanez M.D.    Preoperative Other



           Spec









 V72.81  Examination Preoperative Cardiovascular

 

 719.46  Pain Joint Lower Leg

 

 401.1  Hypertension Benign

 

 272.0  Hypercholesterolemia Pure

 

 424.1  Aortic Valve Disorder

 

 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

 

 V04.81  Need For Prophylactic Vaccination & Inoculation/Influenza









 Office Visit  2010  2:45p  Orthopedic  Dirk Sonny,  715.96  
Osteoarthrosis



     Services Of  M.D.    Unspec Genlzd Or



     C.M.A.      Localized Lower Leg

 

 Office Visit  2010  9:00a  DO Not Use Cma  Vitaliy E.  401.1  
Hypertension Benign



     AT Mary Montanez M.D.    









 272.0  Hypercholesterolemia Pure

 

 424.1  Aortic Valve Disorder

 

 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

 

 715.09  Osteoarthrosis Generalized Multiple Sites









 Office Visit  2010 11:00a  DO Not Use Cma  Vitaliy E.  401.1  
Hypertension Benign



     AT Mary Montanez M.D.    









 272.0  Hypercholesterolemia Pure

 

 424.1  Aortic Valve Disorder









 Office Visit  2009  9:30a  Chavez Burks EMartell  785.2  Murmur Cardiac



     Assoc AT  SREE Montanez    Undiagnosed



     [facility]      









 272.0  Hypercholesterolemia Pure

 

 401.1  Hypertension Benign









 Office Visit  2009 11:00a  Chavez Burks EMartell  785.2  Murmur Cardiac



     Assoc AT  SREE Montanez    Undiagnosed



     [facility]      









 272.0  Hypercholesterolemia Pure

 

 401.1  Hypertension Benign

 

 V06.5  Tetanus Diphtheria (DT)









 Office Visit  2008  Chavez Burks EMartell  272.0  Hypercholesterolemia 
Pure



   11:15a  Assoc AT  SREE Montanez    



     [facility]      









 401.1  Hypertension Benign









 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



   11:30a  Assoc AT  SREE Montanez    Other



     [facility]      

 

 Office Visit  2007  [facility]  Vitaliy RODRÍGUEZ  272.0  Hypercholesterolemia 
Pure



   9:45a  Assoc AT  SREE Montanez    



     [facility]      









 401.1  Hypertension Benign







Plan of Treatment

Future Appointment(s):2019  2:20 pm - Vitaliy Montanez M.D. at [facility] 
Internal Medicine [facility]2018 - Vitaliy Montanez M.D.R55 Syncope and 
collapseNew Orders:Overnight Oximetry, Ordered: 18Holter Monitor, Ordered
: 18I10 Essential (primary) hypertensionFollow up:wellness exam in 6 
months or prnE78.00 Pure hypercholesterolemia, [id number] Dyspnea, 
unspecified

## 2018-12-30 LAB
ANION GAP SERPL CALC-SCNC: 5 MMOL/L (ref 2–11)
BASOPHILS # BLD AUTO: 0.1 10^3/UL (ref 0–0.2)
BUN SERPL-MCNC: 24 MG/DL (ref 6–24)
BUN/CREAT SERPL: 21.4 (ref 8–20)
CALCIUM SERPL-MCNC: 9.5 MG/DL (ref 8.6–10.3)
CHLORIDE SERPL-SCNC: 111 MMOL/L (ref 101–111)
CHOLEST SERPL-MCNC: 122 MG/DL
EOSINOPHIL # BLD AUTO: 0.2 10^3/UL (ref 0–0.6)
GLUCOSE SERPL-MCNC: 118 MG/DL (ref 70–100)
HCO3 SERPL-SCNC: 25 MMOL/L (ref 22–32)
HCT VFR BLD AUTO: 38 % (ref 42–52)
HDLC SERPL-MCNC: 47.2 MG/DL
HGB BLD-MCNC: 12.5 G/DL (ref 14–18)
LYMPHOCYTES # BLD AUTO: 1.3 10^3/UL (ref 1–4.8)
MCH RBC QN AUTO: 30 PG (ref 27–31)
MCHC RBC AUTO-ENTMCNC: 33 G/DL (ref 31–36)
MCV RBC AUTO: 92 FL (ref 80–94)
MONOCYTES # BLD AUTO: 0.9 10^3/UL (ref 0–0.8)
NEUTROPHILS # BLD AUTO: 6.9 10^3/UL (ref 1.5–7.7)
NRBC # BLD AUTO: 0 10^3/UL
NRBC BLD QL AUTO: 0
PLATELET # BLD AUTO: 186 10^3/UL (ref 150–450)
POTASSIUM SERPL-SCNC: 4 MMOL/L (ref 3.5–5)
RBC # BLD AUTO: 4.16 10^6/UL (ref 4–5.4)
SODIUM SERPL-SCNC: 141 MMOL/L (ref 135–145)
TRIGL SERPL-MCNC: 94 MG/DL
TSH SERPL-ACNC: 0.92 MCIU/ML (ref 0.34–5.6)
WBC # BLD AUTO: 9.3 10^3/UL (ref 3.5–10.8)

## 2018-12-30 RX ADMIN — ATORVASTATIN CALCIUM SCH MG: 10 TABLET, FILM COATED ORAL at 09:21

## 2018-12-30 RX ADMIN — HALOPERIDOL LACTATE PRN MG: 5 INJECTION, SOLUTION INTRAMUSCULAR at 20:26

## 2018-12-30 RX ADMIN — HEPARIN SODIUM SCH UNITS: 5000 INJECTION INTRAVENOUS; SUBCUTANEOUS at 13:15

## 2018-12-30 RX ADMIN — HEPARIN SODIUM SCH UNITS: 5000 INJECTION INTRAVENOUS; SUBCUTANEOUS at 21:46

## 2018-12-30 RX ADMIN — MEMANTINE HYDROCHLORIDE SCH MG: 10 TABLET ORAL at 19:29

## 2018-12-30 RX ADMIN — CALCIUM SCH MG: 500 TABLET ORAL at 09:21

## 2018-12-30 RX ADMIN — Medication SCH: at 19:29

## 2018-12-30 RX ADMIN — METOPROLOL TARTRATE SCH MG: 25 TABLET, FILM COATED ORAL at 19:29

## 2018-12-30 RX ADMIN — UMECLIDINIUM BROMIDE AND VILANTEROL TRIFENATATE SCH: 62.5; 25 POWDER RESPIRATORY (INHALATION) at 07:49

## 2018-12-30 RX ADMIN — CALCIUM SCH MG: 500 TABLET ORAL at 19:29

## 2018-12-30 RX ADMIN — ENALAPRIL MALEATE SCH MG: 5 TABLET ORAL at 09:21

## 2018-12-30 RX ADMIN — Medication SCH UNITS: at 09:21

## 2018-12-30 RX ADMIN — HEPARIN SODIUM SCH UNITS: 5000 INJECTION INTRAVENOUS; SUBCUTANEOUS at 09:21

## 2018-12-30 RX ADMIN — METOPROLOL TARTRATE SCH MG: 25 TABLET, FILM COATED ORAL at 09:21

## 2018-12-30 RX ADMIN — Medication SCH: at 09:21

## 2018-12-30 RX ADMIN — AMLODIPINE BESYLATE SCH MG: 5 TABLET ORAL at 09:21

## 2018-12-30 RX ADMIN — MEMANTINE HYDROCHLORIDE SCH MG: 10 TABLET ORAL at 09:21

## 2018-12-30 RX ADMIN — ASPIRIN SCH MG: 81 TABLET, COATED ORAL at 09:21

## 2018-12-30 NOTE — PN
Subjective


Date of Service: 12/30/18


Interval History: 





Patient is very confused today but is generally amiable when being examined. 

Patient denies pain. Patient is not at all oriented and is not able to engage 

with ROS. Patient had a fall this AM which was witnessed and involved sliding 

gently to the floor out of bed with no apparent injury. 


Family History: Unchanged from Admission


Social History: Unchanged from Admission


Past Medical History: Unchanged from Admission





Objective


Active Medications: 








Acetaminophen (Tylenol Tab*)  650 mg PO Q4H PRN


   PRN Reason: PAIN/FEVER


Amlodipine Besylate (Norvasc Tab*)  5 mg PO DAILY [facility]


   Last Admin: 12/30/18 09:21 Dose:  5 mg


Aspirin (Aspirin Ec Tab*)  81 mg PO DAILY [facility]


   Last Admin: 12/30/18 09:21 Dose:  81 mg


Atorvastatin Calcium (Lipitor*)  10 mg PO DAILY [facility]


   Last Admin: 12/30/18 09:21 Dose:  10 mg


Calcium Carbonate (Calcium Carbonate Tab*)  1,250 mg PO BID [facility]


   Last Admin: 12/30/18 09:21 Dose:  1,250 mg


Cholecalciferol (Vitamin D Tab*)  1,000 units PO DAILY [facility]


   Last Admin: 12/30/18 09:21 Dose:  1,000 units


Enalapril Maleate (Vasotec Tab*)  10 mg PO DAILY [facility]


   Last Admin: 12/30/18 09:21 Dose:  10 mg


Fish Oil (Fish Oil (Nf))  1,000 mg PO DAILY [facility]; Protocol


   Last Admin: 12/30/18 09:21 Dose:  Not Given


Heparin Sodium (Porcine) (Heparin Vial(*))  5,000 units SUBCUT Q8HR [facility]


   Last Admin: 12/30/18 13:15 Dose:  5,000 units


Sodium Chloride (Ns 0.9% 1000 Ml*)  1,000 mls @ 75 mls/hr IV PER RATE [facility]


   Last Admin: 12/30/18 07:34 Dose:  75 mls/hr


Loperamide HCl (Imodium Liq*)  2 mg PO DAILY PRN


   PRN Reason: LOOSE STOOLS


Memantine (Namenda Tab*)  10 mg PO BID [facility]


   Last Admin: 12/30/18 09:21 Dose:  10 mg


Metoprolol Tartrate (Lopressor Tab*)  25 mg PO BID [facility]


   Last Admin: 12/30/18 09:21 Dose:  25 mg


(Melatonin [ (Melatonin] 10 Mg))  10 mg PO BEDTIME [facility]


Umeclidinium/Vilanterol (Anoro 62.5/25 Ellipta Device (Nf))  1 inh INH DAILY [facility]


   Last Admin: 12/30/18 07:49 Dose:  Not Given








 Vital Signs - 8 hr











  12/30/18 12/30/18 12/30/18





  10:52 12:05 12:07


 


Temperature 97.2 F 97.4 F 


 


Pulse Rate 65 51 


 


Respiratory 16 16 





Rate   


 


Blood Pressure 103/72 107/70 





(mmHg)   


 


O2 Sat by Pulse 95  95





Oximetry   














  12/30/18





  12:10


 


Temperature 


 


Pulse Rate 60


 


Respiratory 





Rate 


 


Blood Pressure 106/65





(mmHg) 


 


O2 Sat by Pulse 





Oximetry 











Oxygen Devices in Use Now: None


Appearance: Patient is an 84yo male who appears stated age and is sitting in 

the bed in NAD.


Eyes: No Scleral Icterus, PERRLA


Ears/Nose/Mouth/Throat: NL Teeth, Lips, Gums, Clear Oropharnyx, Mucous 

Membranes Moist


Neck: NL Appearance and Movements; NL JVP, Trachea Midline


Respiratory: Symmetrical Chest Expansion and Respiratory Effort, - - Diminished 

throughout. 


Cardiovascular: NL Sounds; No Murmurs; No JVD, RRR, No Edema


Abdominal: NL Sounds; No Tenderness; No Distention, No Hepatosplenomegaly


Lymphatic: No Cervical Adenopathy


Extremities: No Edema, No Clubbing, Cyanosis


Skin: No Rash or Ulcers, No Nodules or Sclerosis


Neurological: - - CN II-XII intact. Moves all limbs. 


Result Diagrams: 


 12/30/18 05:18





 12/30/18 05:13


Microbiology and Other Data: 


 Microbiology











 12/30/18 05:20 Nasal Screen MRSA (PCR) - Final





 Nasal    Mrsa Detected














Assess/Plan/Problems-Billing


Assessment: 





this is 85 yr old male with a history significant for mod dementia, prostate 

cancer, and HTN. Patient was in his normal state of health when he had a 

prolonged episode of syncope with bradycardia and hypotension with an episode 

of diarrhea. Patient was admitted for evaluation of syncope and is stable but 

combative. 





- Patient Problems


(1) Bradycardia


Current Visit: Yes   Status: Acute   Code(s): R00.1 - BRADYCARDIA, UNSPECIFIED 

  SNOMED Code(s): 95000576


   Comment: 


- Symptomatic bradycardia, unknown cause, possible vagal stimulus.


- No evidence of MI


- Check Echo


- Stop Donepezil, Decrease Metoprolol, possible indication for long term 

monitoring


- Consider Cardiology consult if persistent symptomatic recurrences.    





(2) Pulmonary edema


Current Visit: Yes   Status: Acute   Code(s): J81.1 - CHRONIC PULMONARY EDEMA   

SNOMED Code(s): 25348822


   Comment: 


- On Ct scan with signs of pulmonary hypertension


- Will get echo, patient has history of AI


- Will no diurese at thie time due to no obvious signs of fluid overload.    





(3) Elevated d-dimer


Current Visit: Yes   Status: Acute   Code(s): R79.89 - OTHER SPECIFIED ABNORMAL 

FINDINGS OF BLOOD CHEMISTRY   SNOMED Code(s): 112422390


   Comment: 


- Unknown cause, CTA chest negative for PE and LE doppler negative for DVT.    





(4) HTN (hypertension)


Current Visit: Yes   Status: Acute   Code(s): I10 - ESSENTIAL (PRIMARY) 

HYPERTENSION   SNOMED Code(s): 71824291


   Comment: 


- Decrease Metoprolol and Monitor.    





(5) Moderate dementia with behavioral disturbance


Current Visit: Yes   Status: Acute   Code(s): F03.91 - UNSPECIFIED DEMENTIA 

WITH BEHAVIORAL DISTURBANCE   SNOMED Code(s): 77499548


   Comment: 


- PRN haldol for agitation


- Safety Monitor   





(6) PVD (peripheral vascular disease)


Current Visit: Yes   Status: Acute   Code(s): I73.9 - PERIPHERAL VASCULAR 

DISEASE, UNSPECIFIED   SNOMED Code(s): 741465543


   Comment: 


- Asymptomatic.    





(7) DVT prophylaxis


Current Visit: Yes   Status: Acute   Code(s): WII2110 -    SNOMED Code(s): 

055934935


   Comment: 


- Heparin SubQ.    





(8) DNR (do not resuscitate)


Current Visit: Yes   Status: Acute   


Status and Disposition: 





Inpatient for evaluation of syncope.

## 2018-12-31 LAB
ANION GAP SERPL CALC-SCNC: 8 MMOL/L (ref 2–11)
BASOPHILS # BLD AUTO: 0.1 10^3/UL (ref 0–0.2)
BUN SERPL-MCNC: 21 MG/DL (ref 6–24)
BUN/CREAT SERPL: 19.1 (ref 8–20)
CALCIUM SERPL-MCNC: 9.1 MG/DL (ref 8.6–10.3)
CHLORIDE SERPL-SCNC: 108 MMOL/L (ref 101–111)
EOSINOPHIL # BLD AUTO: 0.1 10^3/UL (ref 0–0.6)
GLUCOSE SERPL-MCNC: 90 MG/DL (ref 70–100)
HCO3 SERPL-SCNC: 22 MMOL/L (ref 22–32)
HCT VFR BLD AUTO: 36 % (ref 42–52)
HGB BLD-MCNC: 12.1 G/DL (ref 14–18)
LYMPHOCYTES # BLD AUTO: 1.2 10^3/UL (ref 1–4.8)
MAGNESIUM SERPL-MCNC: 1.6 MG/DL (ref 1.9–2.7)
MCH RBC QN AUTO: 31 PG (ref 27–31)
MCHC RBC AUTO-ENTMCNC: 34 G/DL (ref 31–36)
MCV RBC AUTO: 92 FL (ref 80–94)
MONOCYTES # BLD AUTO: 1 10^3/UL (ref 0–0.8)
NEUTROPHILS # BLD AUTO: 5.8 10^3/UL (ref 1.5–7.7)
NRBC # BLD AUTO: 0 10^3/UL
NRBC BLD QL AUTO: 0.1
PLATELET # BLD AUTO: 178 10^3/UL (ref 150–450)
POTASSIUM SERPL-SCNC: 3.9 MMOL/L (ref 3.5–5)
RBC # BLD AUTO: 3.89 10^6/UL (ref 4–5.4)
SODIUM SERPL-SCNC: 138 MMOL/L (ref 135–145)
WBC # BLD AUTO: 8.2 10^3/UL (ref 3.5–10.8)

## 2018-12-31 RX ADMIN — HALOPERIDOL LACTATE PRN MG: 5 INJECTION, SOLUTION INTRAMUSCULAR at 19:42

## 2018-12-31 RX ADMIN — MEMANTINE HYDROCHLORIDE SCH MG: 10 TABLET ORAL at 19:46

## 2018-12-31 RX ADMIN — ATORVASTATIN CALCIUM SCH MG: 10 TABLET, FILM COATED ORAL at 07:34

## 2018-12-31 RX ADMIN — ASPIRIN SCH MG: 81 TABLET, COATED ORAL at 07:30

## 2018-12-31 RX ADMIN — HEPARIN SODIUM SCH UNITS: 5000 INJECTION INTRAVENOUS; SUBCUTANEOUS at 05:04

## 2018-12-31 RX ADMIN — Medication SCH: at 07:35

## 2018-12-31 RX ADMIN — Medication SCH: at 07:23

## 2018-12-31 RX ADMIN — Medication SCH: at 19:51

## 2018-12-31 RX ADMIN — CALCIUM SCH MG: 500 TABLET ORAL at 19:46

## 2018-12-31 RX ADMIN — METOPROLOL TARTRATE SCH MG: 25 TABLET, FILM COATED ORAL at 07:32

## 2018-12-31 RX ADMIN — UMECLIDINIUM BROMIDE AND VILANTEROL TRIFENATATE SCH: 62.5; 25 POWDER RESPIRATORY (INHALATION) at 07:23

## 2018-12-31 RX ADMIN — CALCIUM SCH: 500 TABLET ORAL at 07:35

## 2018-12-31 RX ADMIN — METOPROLOL TARTRATE SCH MG: 25 TABLET, FILM COATED ORAL at 19:46

## 2018-12-31 RX ADMIN — MEMANTINE HYDROCHLORIDE SCH MG: 10 TABLET ORAL at 07:33

## 2018-12-31 RX ADMIN — HEPARIN SODIUM SCH UNITS: 5000 INJECTION INTRAVENOUS; SUBCUTANEOUS at 21:10

## 2018-12-31 RX ADMIN — AMLODIPINE BESYLATE SCH MG: 5 TABLET ORAL at 07:34

## 2018-12-31 RX ADMIN — HEPARIN SODIUM SCH UNITS: 5000 INJECTION INTRAVENOUS; SUBCUTANEOUS at 13:33

## 2018-12-31 RX ADMIN — ENALAPRIL MALEATE SCH MG: 5 TABLET ORAL at 07:33

## 2018-12-31 NOTE — ECHO
Patient:      ALBINA PEREIRA

Med Rec#:     [id number]            :          1933          

Date:         2018            Age:          85y                 

Account#:     [id number]          Height:       177.8 cm / 70.0 in

Accession#:   [id number]           Weight:       90.72 kg / 199.9 lbs

Sex:          M                     BSA:          2.09

Room#:        438                   

Admit Date#:  2018          

Type:         Inpatient

 

Referring:    Addie Peacock

Reading:      Jong Mckeon MD

Sonographer:  Yuli Nielson RDCS

CC:           Vitaliy Montanez MD

______________________________________________________________________

 

Transthoracic Echocardiogram

 

Indication:

Syncope

BP:           133/66

HR:           104

Rhythm:       Tachycardia

 

Findings     

History:

AS,HTN,formersmoker,HLD,dementia. 

 

Technical Comments:

The study is technically limited due to the patient's smoking history.  

Transiently combative during study.  Definity used to enhance images.

The study was technically limited due to the patient's inability to lay

in the left lateral decubitus position.   Completed at 1217. 

 

Left Ventricle:

The left ventricular chamber size is normal. Mild concentric left

ventricular hypertrophy is observed. Global left ventricular wall motion

and contractility are within normal limits. There is normal left

ventricular systolic function. The estimated ejection fraction is

55-60%.  Abnormal left ventricular diastolic function is observed. 

 

Left Atrium:

The left atrium is mildly dilated. 

 

Right Ventricle:

The right ventricle is not well visualized. The right ventricular cavity

size is normal. The right ventricular global systolic function is

normal. 

 

Right Atrium:

The right atrium is not well visualized. 

 

Aortic Valve:

The aortic valve is trileaflet. Mild aortic cusp sclerosis is present.

There is no evidence of aortic regurgitation. There is borderline aortic

stenosis present. Highest aortic valve velocity was acquired with Pedoff

in apical position. 

 

Mitral Valve:

There is mitral annular calcification. There is trace to mild mitral

regurgitation. 

 

Tricuspid Valve:

The tricuspid valve leaflets are normal.  There is trace to mild

tricuspid regurgitation. There is evidence of mild pulmonary

hypertension. 

 

Pulmonic Valve:

The pulmonic valve appears normal. There is trace to mild pulmonic

regurgitation. There is no pulmonic stenosis.  

 

Pericardium:

There is no significant pericardial effusion. A pericardial fat pad is

visualized. 

 

Aorta:

There is mild dilatation of the ascending aorta. The aortic arch is not

well visualized.  There is mild dilatation of the aortic root. 

 

Pulmonary Artery:

The main pulmonary artery is not well visualized. 

 

Venous:

The venous system is not well visualized.  Due to patient being

combative. 

 

Contrast:

Definity was used to optimize study.   A total of 4 ml used. Intravenous

contrast was used to enhance endocardial border definition. 

 

Conclusions

There is normal left ventricular systolic function.

The estimated ejection fraction is 55-60%. 

Global left ventricular wall motion and contractility are within normal

limits.

The left ventricular chamber size is normal.

Mild concentric left ventricular hypertrophy is observed.

Abnormal left ventricular diastolic function is observed.

The left atrium is mildly dilated.

Borderline mild aortic stenosis. 

There is evidence of mild pulmonary hypertension.

There is mild dilatation of the ascending aorta.

Since the prior echocardiogram completed 9/3/113, prior severe aortic

stenosis reported. 

 

Measurements     

Name                    Value         Normal Range            

RVIDd (AP) 2D           3.7 cm        (0.9 - 2.6)             

IVSd (2D)               1.3 cm        (0.6 - 1)               

LVPWd (2D)              0.8 cm        (0.6 - 1)               

LVIDd (2D)              4.2 cm        (3.6 - 5.4)             

LVIDs (2D)              2.8 cm        -                        

LV FS (2D)              33 %          (25 - 45)               

Aortic Annulus          2.6 cm        (1.4 - 2.6)             

Ao root diameter (2D)   3.9 cm        (2.1 - 3.5)             

Ascending Ao            3.9 cm        (2.1 - 3.4)             

LA dimension (AP) 2D    4.2 cm        (2.3 - 3.8)             

 

Name                    Value         Normal Range            

MV E-wave Vmax          0.9 m/sec     -                        

MV deceleration time    271 msec      -                        

MV A-wave Vmax          1.3 m/sec     -                        

MV E:A ratio            0.72 ratio    -                        

LV septal e' Vmax       0.06 m/sec    -                        

LV lateral e' Vmax      0.08 m/sec    -                        

LV E:e' septal ratio    15 ratio      -                        

LV E:e' lateral ratio   11.25 ratio   -                        

 

Name                    Value         Normal Range            

AV Vmax                 2.1 m/sec     -                        

AV VTI                  49.8 cm       -                        

AV peak gradient        17.72 mmHg    -                        

AV mean gradient        7.62 mmHg     -                        

LVOT diameter           2.4 cm        -                        

LVOT Vmax               0.9 m/sec     -                        

LVOT VTI                25.6 cm       -                        

LVOT peak gradient      2.94 mmHg     -                        

LVOT mean gradient      1.46 mmHg     -                        

LAUREEN (continuity Vmax)   1.9 cm2       -                        

LAUREEN (continuity VTI)    2.3 cm2       -                        

 

Name                    Value         Normal Range            

TR Vmax                 2.7 m/sec     -                        

TR peak gradient        29 mmHg       -                        

RAP                     8 mmHg        -                        

RVSP                    37 mmHg       -                        

 

Name                    Value         Normal Range            

PV Vmax                 0.8 m/sec     -                        

PV peak gradient        2.7 mmHg      -                        

 

Electronically signed by: Jong Mckeon MD on 2018 14:11:59

## 2018-12-31 NOTE — PN
Subjective


Date of Service: 12/31/18


Interval History: 





Mr Denis is an 84 yo male, admitted with concern for syncope and bradycardia. 

He is in room with safety monitor due to combative behavior and confusion. He 

is pleasant and cooperative this morning, denying and pain or discomfort. He 

has no complaints and has been cooperative with care this morning. One 10 

minute episode of bradycardia that was asymptomatic (40s-50s).





Family History: Unchanged from Admission


Social History: Unchanged from Admission


Past Medical History: Unchanged from Admission





Objective


Active Medications: 








Acetaminophen (Tylenol Tab*)  650 mg PO Q4H PRN


   PRN Reason: PAIN/FEVER


Amlodipine Besylate (Norvasc Tab*)  5 mg PO DAILY [facility]


   Last Admin: 12/31/18 07:34 Dose:  5 mg


Aspirin (Aspirin Ec Tab*)  81 mg PO DAILY [facility]


   Last Admin: 12/31/18 07:30 Dose:  81 mg


Atorvastatin Calcium (Lipitor*)  10 mg PO DAILY [facility]


   Last Admin: 12/31/18 07:34 Dose:  10 mg


Calcium Carbonate (Calcium Carbonate Tab*)  1,250 mg PO BID [facility]


   Last Admin: 12/31/18 07:35 Dose:  Not Given


Cholecalciferol (Vitamin D Tab*)  1,000 units PO DAILY [facility]


   Last Admin: 12/31/18 07:35 Dose:  Not Given


Enalapril Maleate (Vasotec Tab*)  10 mg PO DAILY [facility]


   Last Admin: 12/31/18 07:33 Dose:  10 mg


Fish Oil (Fish Oil (Nf))  1,000 mg PO DAILY [facility]; Protocol


   Last Admin: 12/31/18 07:23 Dose:  Not Given


Haloperidol Lactate (Haldol Inj Iv/Im*)  5 mg IV SLOW PU Q6H PRN


   PRN Reason: AGITATION


   Last Admin: 12/30/18 20:26 Dose:  5 mg


Heparin Sodium (Porcine) (Heparin Vial(*))  5,000 units SUBCUT Q8HR [facility]


   Last Admin: 12/31/18 13:33 Dose:  5,000 units


Loperamide HCl (Imodium Liq*)  2 mg PO DAILY [facility]


   PRN Reason: LOOSE STOOLS


Memantine (Namenda Tab*)  10 mg PO BID [facility]


   Last Admin: 12/31/18 07:33 Dose:  10 mg


Metoprolol Tartrate (Lopressor Tab*)  25 mg PO BID [facility]


   Last Admin: 12/31/18 07:32 Dose:  25 mg


(Melatonin [ (Melatonin] 10 Mg))  10 mg PO BEDTIME [facility]


   Last Admin: 12/30/18 19:29 Dose:  Not Given


Umeclidinium/Vilanterol (Anoro 62.5/25 Ellipta Device (Nf))  1 inh INH DAILY [facility]


   Last Admin: 12/31/18 07:23 Dose:  Not Given








 Vital Signs - 8 hr











  12/31/18 12/31/18 12/31/18





  10:38 12:15 16:18


 


Temperature  98.3 F 98.1 F


 


Pulse Rate  65 80


 


Respiratory  20 20





Rate   


 


Blood Pressure  119/68 127/59





(mmHg)   


 


O2 Sat by Pulse 93 92 96





Oximetry   











Oxygen Devices in Use Now: None


Appearance: Elderly male, lying in bed, in NAD.


Eyes: No Scleral Icterus, PERRLA


Ears/Nose/Mouth/Throat: Clear Oropharnyx, Mucous Membranes Moist


Neck: NL Appearance and Movements; NL JVP


Respiratory: Symmetrical Chest Expansion and Respiratory Effort, Clear to 

Auscultation


Cardiovascular: NL Sounds; No Murmurs; No JVD, RRR, No Edema


Abdominal: NL Sounds; No Tenderness; No Distention


Extremities: No Edema, No Clubbing, Cyanosis


Skin: No Rash or Ulcers


Neurological: NL Muscle Strength and Tone, - - Alert, oriented to self and 

knows he is in a hospital. 


Lines/Tubes/Other Access: Clean, Dry and Intact Peripheral IV


Nutrition: Taking PO's


Result Diagrams: 


 12/31/18 05:31





 12/31/18 05:31


Microbiology and Other Data: 


 Microbiology











 12/30/18 05:20 Nasal Screen MRSA (PCR) - Final





 Nasal    Mrsa Detected











EKG Data: 





ns no acute st t change 





Assess/Plan/Problems-Billing


Assessment: 





Mr. Denis is 85 yr old male with a history significant for mod dementia, 

prostate cancer, and HTN. Patient was in his normal state of health when he had 

a prolonged episode of syncope with bradycardia and hypotension with an episode 

of diarrhea. Patient was admitted for evaluation of syncope and is stable but 

combative. 








- Patient Problems


(1) Bradycardia


Code(s): R00.1 - BRADYCARDIA, UNSPECIFIED   Comment: 


No further episodes of symptomatic bradycardia


Donepezil and metoprolol discontinued


No evidence of MI


Check Echo; unable to perform yesterday due to combative behavior.


Consider Cardiology consult if persistent symptomatic recurrences.    





(2) Pulmonary edema


Code(s): J81.1 - CHRONIC PULMONARY EDEMA   Comment: 


On Ct scan with signs of pulmonary hypertension


Will get echo, patient has history of AI


Will not diurese at thie time due to no obvious signs of fluid overload.    





(3) Elevated d-dimer


Code(s): R79.89 - OTHER SPECIFIED ABNORMAL FINDINGS OF BLOOD CHEMISTRY   Comment

: 


Unknown cause, CTA chest negative for PE and LE doppler negative for DVT.    





(4) HTN (hypertension)


Code(s): I10 - ESSENTIAL (PRIMARY) HYPERTENSION   Comment: 


Controlled


Continue decreased metoprolol, amlodipine, enalapril   





(5) Moderate dementia with behavioral disturbance


Code(s): F03.91 - UNSPECIFIED DEMENTIA WITH BEHAVIORAL DISTURBANCE   Comment: 


PRN haldol for agitation


Safety Monitor   





(6) PVD (peripheral vascular disease)


Code(s): I73.9 - PERIPHERAL VASCULAR DISEASE, UNSPECIFIED   Comment: 


Asymptomatic.    





(7) DVT prophylaxis


Comment: 


Heparin SubQ.    





(8) DNR (do not resuscitate)





Status and Disposition: 





Inpatient for evaluation of syncope. Will likely need GRACE prior to returning to 

[place]. 


Attending: Steve Cardenas

## 2019-01-01 RX ADMIN — HALOPERIDOL LACTATE PRN MG: 5 INJECTION, SOLUTION INTRAMUSCULAR at 18:33

## 2019-01-01 RX ADMIN — ASPIRIN SCH MG: 81 TABLET, COATED ORAL at 09:16

## 2019-01-01 RX ADMIN — Medication SCH: at 09:16

## 2019-01-01 RX ADMIN — MEMANTINE HYDROCHLORIDE SCH: 10 TABLET ORAL at 09:23

## 2019-01-01 RX ADMIN — ENALAPRIL MALEATE SCH MG: 5 TABLET ORAL at 09:13

## 2019-01-01 RX ADMIN — METOPROLOL TARTRATE SCH MG: 25 TABLET, FILM COATED ORAL at 09:16

## 2019-01-01 RX ADMIN — METOPROLOL TARTRATE SCH MG: 25 TABLET, FILM COATED ORAL at 21:38

## 2019-01-01 RX ADMIN — MEMANTINE HYDROCHLORIDE SCH MG: 10 TABLET ORAL at 21:37

## 2019-01-01 RX ADMIN — HEPARIN SODIUM SCH UNITS: 5000 INJECTION INTRAVENOUS; SUBCUTANEOUS at 15:23

## 2019-01-01 RX ADMIN — CALCIUM SCH: 500 TABLET ORAL at 21:43

## 2019-01-01 RX ADMIN — ENALAPRIL MALEATE SCH: 5 TABLET ORAL at 09:25

## 2019-01-01 RX ADMIN — Medication SCH: at 09:24

## 2019-01-01 RX ADMIN — CALCIUM SCH: 500 TABLET ORAL at 09:16

## 2019-01-01 RX ADMIN — ATORVASTATIN CALCIUM SCH MG: 10 TABLET, FILM COATED ORAL at 09:16

## 2019-01-01 RX ADMIN — HEPARIN SODIUM SCH UNITS: 5000 INJECTION INTRAVENOUS; SUBCUTANEOUS at 21:44

## 2019-01-01 RX ADMIN — TIOTROPIUM BROMIDE SCH CAP: 18 CAPSULE ORAL; RESPIRATORY (INHALATION) at 08:11

## 2019-01-01 RX ADMIN — UMECLIDINIUM BROMIDE AND VILANTEROL TRIFENATATE SCH: 62.5; 25 POWDER RESPIRATORY (INHALATION) at 07:13

## 2019-01-01 RX ADMIN — HEPARIN SODIUM SCH UNITS: 5000 INJECTION INTRAVENOUS; SUBCUTANEOUS at 05:13

## 2019-01-01 RX ADMIN — AMLODIPINE BESYLATE SCH MG: 5 TABLET ORAL at 09:16

## 2019-01-01 NOTE — PN
Subjective


Date of Service: 01/01/19


Interval History: 





Patient in room with safety monitor. He is sleepy but agreeable with 

examination. He states, "I feel fine." No further bradycardic events on 

telemetry monitor. No reported dizziness or concern for syncopal or near 

syncopal episodes.





Tele: [id number]





Family History: Unchanged from Admission


Social History: Unchanged from Admission


Past Medical History: Unchanged from Admission





Objective


Active Medications: 








Acetaminophen (Tylenol Tab*)  650 mg PO Q4H PRN


   PRN Reason: PAIN/FEVER


Albuterol (Ventolin Hfa Inhaler*)  2 puff INH Q4H PRN


   PRN Reason: SHORTNESS OF BREATH


Amlodipine Besylate (Norvasc Tab*)  5 mg PO DAILY [facility]


   Last Admin: 01/01/19 09:16 Dose:  5 mg


Aspirin (Aspirin Ec Tab*)  81 mg PO DAILY [facility]


   Last Admin: 01/01/19 09:16 Dose:  81 mg


Atorvastatin Calcium (Lipitor*)  10 mg PO DAILY [facility]


   Last Admin: 01/01/19 09:16 Dose:  10 mg


Calcium Carbonate (Calcium Carbonate Tab*)  1,250 mg PO BID [facility]


   Last Admin: 01/01/19 09:16 Dose:  Not Given


Cholecalciferol (Vitamin D Tab*)  1,000 units PO DAILY [facility]


   Last Admin: 01/01/19 09:16 Dose:  Not Given


Enalapril Maleate (Vasotec Tab*)  10 mg PO DAILY [facility]


   Last Admin: 01/01/19 09:25 Dose:  Not Given


Fish Oil (Fish Oil (Nf))  1,000 mg PO DAILY [facility]; Protocol


   Last Admin: 01/01/19 09:24 Dose:  Not Given


Haloperidol Lactate (Haldol Inj Iv/Im*)  5 mg IV SLOW PU Q6H PRN


   PRN Reason: AGITATION


   Last Admin: 12/31/18 19:42 Dose:  5 mg


Heparin Sodium (Porcine) (Heparin Vial(*))  5,000 units SUBCUT Q8HR [facility]


   Last Admin: 01/01/19 05:13 Dose:  5,000 units


Loperamide HCl (Imodium Liq*)  2 mg PO DAILY PRN


   PRN Reason: LOOSE STOOLS


Melatonin (Melatonin)  9 mg PO BEDTIME [facility]


Memantine (Namenda Tab*)  10 mg PO BID [facility]


   Last Admin: 01/01/19 09:23 Dose:  Not Given


Metoprolol Tartrate (Lopressor Tab*)  25 mg PO BID [facility]


   Last Admin: 01/01/19 09:16 Dose:  25 mg


Tiotropium Bromide (Spiriva Cap.Inh*)  1 cap INH DAILY [facility]


   Last Admin: 01/01/19 08:11 Dose:  1 cap








 Vital Signs - 8 hr











  01/01/19 01/01/19 01/01/19





  07:51 08:00 08:13


 


Temperature 99.5 F  


 


Pulse Rate 105  91


 


Respiratory 20 16 20





Rate   


 


Blood Pressure 120/70  





(mmHg)   


 


O2 Sat by Pulse 92  94





Oximetry   











Oxygen Devices in Use Now: None


Appearance: 86 yo male, lying in bed, NAD


Eyes: No Scleral Icterus, PERRLA


Ears/Nose/Mouth/Throat: Clear Oropharnyx, Mucous Membranes Moist


Neck: NL Appearance and Movements; NL JVP


Respiratory: Symmetrical Chest Expansion and Respiratory Effort, Clear to 

Auscultation


Cardiovascular: NL Sounds; No Murmurs; No JVD, RRR, No Edema


Abdominal: NL Sounds; No Tenderness; No Distention


Extremities: No Edema, No Clubbing, Cyanosis


Skin: No Rash or Ulcers


Neurological: - - Alert, oriented to self


Lines/Tubes/Other Access: Clean, Dry and Intact Peripheral IV


Nutrition: Taking PO's


Result Diagrams: 


 12/31/18 05:31





 12/31/18 05:31


Microbiology and Other Data: 


 Microbiology











 12/30/18 05:20 Nasal Screen MRSA (PCR) - Final





 Nasal    Mrsa Detected











EKG Data: 





ns no acute st t change 





Assess/Plan/Problems-Billing


Assessment: 





Mr. Denis is 85 yr old male with a history significant for mod dementia, 

prostate cancer, and HTN. Patient was in his normal state of health when he had 

a prolonged episode of syncope with bradycardia and hypotension with an episode 

of diarrhea. Patient was admitted for evaluation of syncope and is stable but 

combative. 








- Patient Problems


(1) Bradycardia


Code(s): R00.1 - BRADYCARDIA, UNSPECIFIED   Comment: 


No further episodes of symptomatic bradycardia


Donepezil discontinued and metoprolol decreased with resolution of bradycardia.


No evidence of MI


Echocardiogram shows EF 55-60% with mild AS and mild pulm htn.


May benefit from outpatient monitoring of bradycardia.   





(2) Pulmonary edema


Code(s): J81.1 - CHRONIC PULMONARY EDEMA   Comment: 


On Ct scan with signs of pulmonary hypertension


Will not diurese at thie time due to no obvious signs of fluid overload.    





(3) Elevated d-dimer


Code(s): R79.89 - OTHER SPECIFIED ABNORMAL FINDINGS OF BLOOD CHEMISTRY   Comment

: 


Unknown cause, CTA chest negative for PE and LE doppler negative for DVT.    





(4) HTN (hypertension)


Code(s): I10 - ESSENTIAL (PRIMARY) HYPERTENSION   Comment: 


Controlled


Continue decreased metoprolol, amlodipine, enalapril   





(5) Moderate dementia with behavioral disturbance


Code(s): F03.91 - UNSPECIFIED DEMENTIA WITH BEHAVIORAL DISTURBANCE   Comment: 


PRN haldol for agitation


Safety Monitor   





(6) PVD (peripheral vascular disease)


Code(s): I73.9 - PERIPHERAL VASCULAR DISEASE, UNSPECIFIED   Comment: 


Asymptomatic.    





(7) DVT prophylaxis


Comment: 


Heparin SubQ.    





(8) DNR (do not resuscitate)





Status and Disposition: 





Inpatient for evaluation of syncope. Will likely need GRACE prior to returning to 

[place]. 


Attending: Fabiola Sigala

## 2019-01-02 LAB
ANION GAP SERPL CALC-SCNC: 9 MMOL/L (ref 2–11)
BUN SERPL-MCNC: 40 MG/DL (ref 6–24)
BUN/CREAT SERPL: 33.3 (ref 8–20)
CALCIUM SERPL-MCNC: 8.8 MG/DL (ref 8.6–10.3)
CHLORIDE SERPL-SCNC: 108 MMOL/L (ref 101–111)
GLUCOSE SERPL-MCNC: 200 MG/DL (ref 70–100)
HCO3 SERPL-SCNC: 24 MMOL/L (ref 22–32)
MAGNESIUM SERPL-MCNC: 2.2 MG/DL (ref 1.9–2.7)
POTASSIUM SERPL-SCNC: 3.9 MMOL/L (ref 3.5–5)
SODIUM SERPL-SCNC: 141 MMOL/L (ref 135–145)

## 2019-01-02 RX ADMIN — HEPARIN SODIUM SCH UNITS: 5000 INJECTION INTRAVENOUS; SUBCUTANEOUS at 05:45

## 2019-01-02 RX ADMIN — Medication SCH UNITS: at 08:50

## 2019-01-02 RX ADMIN — ENALAPRIL MALEATE SCH MG: 5 TABLET ORAL at 08:46

## 2019-01-02 RX ADMIN — MEMANTINE HYDROCHLORIDE SCH MG: 10 TABLET ORAL at 08:54

## 2019-01-02 RX ADMIN — CALCIUM SCH MG: 500 TABLET ORAL at 08:52

## 2019-01-02 RX ADMIN — METOPROLOL TARTRATE SCH MG: 25 TABLET, FILM COATED ORAL at 08:49

## 2019-01-02 RX ADMIN — ASPIRIN SCH MG: 81 TABLET, COATED ORAL at 08:47

## 2019-01-02 RX ADMIN — TIOTROPIUM BROMIDE SCH CAP: 18 CAPSULE ORAL; RESPIRATORY (INHALATION) at 08:23

## 2019-01-02 RX ADMIN — NYSTATIN SCH: 100000 SUSPENSION ORAL at 03:26

## 2019-01-02 RX ADMIN — NYSTATIN SCH UNITS: 100000 SUSPENSION ORAL at 13:43

## 2019-01-02 RX ADMIN — HEPARIN SODIUM SCH UNITS: 5000 INJECTION INTRAVENOUS; SUBCUTANEOUS at 13:43

## 2019-01-02 RX ADMIN — NYSTATIN SCH UNITS: 100000 SUSPENSION ORAL at 08:55

## 2019-01-02 RX ADMIN — NYSTATIN SCH UNITS: 100000 SUSPENSION ORAL at 17:33

## 2019-01-02 RX ADMIN — CALCIUM SCH MG: 500 TABLET ORAL at 23:18

## 2019-01-02 RX ADMIN — MEMANTINE HYDROCHLORIDE SCH MG: 10 TABLET ORAL at 23:17

## 2019-01-02 RX ADMIN — NYSTATIN SCH UNITS: 100000 SUSPENSION ORAL at 23:19

## 2019-01-02 RX ADMIN — ACETAMINOPHEN PRN MG: 325 TABLET ORAL at 17:37

## 2019-01-02 RX ADMIN — HEPARIN SODIUM SCH UNITS: 5000 INJECTION INTRAVENOUS; SUBCUTANEOUS at 23:19

## 2019-01-02 RX ADMIN — ATORVASTATIN CALCIUM SCH MG: 10 TABLET, FILM COATED ORAL at 08:48

## 2019-01-02 RX ADMIN — Medication SCH MG: at 08:51

## 2019-01-02 RX ADMIN — ACETAMINOPHEN PRN MG: 325 TABLET ORAL at 23:16

## 2019-01-02 RX ADMIN — AMLODIPINE BESYLATE SCH MG: 5 TABLET ORAL at 08:54

## 2019-01-02 RX ADMIN — METOPROLOL TARTRATE SCH MG: 25 TABLET, FILM COATED ORAL at 23:17

## 2019-01-02 NOTE — PN
Subjective


Date of Service: 01/02/19


Interval History: 








Resting in bed on assessment. Nurse present during assessment as patient needed 

to be reposition. 





Patient was pleasant and cooperative. Alert to self only.





Denies pain. ROS limited due to confusion. 





Per RN not episodes of symptomatic bradycardia and/or hypotension


Family History: Unchanged from Admission


Social History: Unchanged from Admission


Past Medical History: Unchanged from Admission





Objective


Active Medications: 








Acetaminophen (Tylenol Tab*)  650 mg PO Q4H PRN


   PRN Reason: PAIN/FEVER


   Last Admin: 01/02/19 17:37 Dose:  650 mg


Albuterol (Ventolin Hfa Inhaler*)  2 puff INH Q4H PRN


   PRN Reason: SHORTNESS OF BREATH


Amlodipine Besylate (Norvasc Tab*)  5 mg PO DAILY [facility]


   Last Admin: 01/02/19 08:54 Dose:  5 mg


Aspirin (Aspirin Ec Tab*)  81 mg PO DAILY [facility]


   Last Admin: 01/02/19 08:47 Dose:  81 mg


Atorvastatin Calcium (Lipitor*)  10 mg PO DAILY [facility]


   Last Admin: 01/02/19 08:48 Dose:  10 mg


Calcium Carbonate (Calcium Carbonate Tab*)  1,250 mg PO BID [facility]


   Last Admin: 01/02/19 08:52 Dose:  1,250 mg


Cholecalciferol (Vitamin D Tab*)  1,000 units PO DAILY [facility]


   Last Admin: 01/02/19 08:50 Dose:  1,000 units


Enalapril Maleate (Vasotec Tab*)  10 mg PO DAILY [facility]


   Last Admin: 01/02/19 08:46 Dose:  10 mg


Fish Oil (Fish Oil (Nf))  1,000 mg PO DAILY [facility]; Protocol


   Last Admin: 01/02/19 08:51 Dose:  1,000 mg


Heparin Sodium (Porcine) (Heparin Vial(*))  5,000 units SUBCUT Q8HR [facility]


   Last Admin: 01/02/19 13:43 Dose:  5,000 units


Loperamide HCl (Imodium Liq*)  2 mg PO DAILY PRN


   PRN Reason: LOOSE STOOLS


Melatonin (Melatonin)  9 mg PO BEDTIME [facility]


   Last Admin: 01/01/19 21:37 Dose:  9 mg


Memantine (Namenda Tab*)  10 mg PO BID [facility]


   Last Admin: 01/02/19 08:54 Dose:  10 mg


Metoprolol Tartrate (Lopressor Tab*)  25 mg PO BID [facility]


   Last Admin: 01/02/19 08:49 Dose:  25 mg


Nystatin (Nystatin Suspension*)  500,000 units PO QID [facility]


   Stop: 01/09/19 00:40


   Last Admin: 01/02/19 17:33 Dose:  500,000 units


Risperidone (Risperdal*)  0.5 mg PO BEDTIME [facility]


Tiotropium Bromide (Spiriva Cap.Inh*)  1 cap INH DAILY [facility]


   Last Admin: 01/02/19 08:23 Dose:  1 cap








 Vital Signs - 8 hr











  01/02/19 01/02/19





  11:44 15:54


 


Temperature 98.9 F 


 


Pulse Rate 76 77


 


Respiratory 20 





Rate  


 


Blood Pressure 118/55 





(mmHg)  


 


O2 Sat by Pulse 92 94





Oximetry  











Oxygen Devices in Use Now: None


Appearance: NAD


Eyes: PERRLA


Ears/Nose/Mouth/Throat: Clear Oropharnyx, Mucous Membranes Moist


Neck: NL Appearance and Movements; NL JVP


Respiratory: Symmetrical Chest Expansion and Respiratory Effort, Clear to 

Auscultation


Cardiovascular: NL Sounds; No Murmurs; No JVD, RRR, No Edema


Abdominal: NL Sounds; No Tenderness; No Distention


Lymphatic: No Cervical Adenopathy


Extremities: No Edema


Skin: No Rash or Ulcers - Alert to self only, -


Neurological: -


Nutrition: Taking PO's


Result Diagrams: 


 12/31/18 05:31





 01/02/19 09:58


Additional Lab and Data: 





 Laboratory Results - last 24 hr











  01/02/19





  09:58


 


Sodium  141


 


Potassium  3.9


 


Chloride  108


 


Carbon Dioxide  24


 


Anion Gap  9


 


BUN  40 H


 


Creatinine  1.20 H


 


Est GFR ( Amer)  69.6


 


Est GFR (Non-Af Amer)  57.5


 


BUN/Creatinine Ratio  33.3 H


 


Glucose  200 H


 


Calcium  8.8


 


Magnesium  2.2











Microbiology and Other Data: 


 Microbiology











 12/30/18 05:20 Nasal Screen MRSA (PCR) - Final





 Nasal    Mrsa Detected











EKG Data: 





ns no acute st t change 





Assess/Plan/Problems-Billing


Assessment: 





Mr. Denis is 85 yr old male with a history significant for mod dementia, 

prostate cancer, and HTN. Patient was in his normal state of health when he had 

a prolonged episode of syncope with bradycardia and hypotension with an episode 

of diarrhea. Patient was admitted for evaluation of syncope and is stable but 

combative. 








- Patient Problems


(1) Bradycardia


Comment: 


- No further episodes of symptomatic bradycardia


- Donepezil discontinued and metoprolol decreased with resolution of 

bradycardia.


- No evidence of MI


- Echocardiogram shows EF 55-60% with mild AS and mild pulm htn.


- May benefit from outpatient monitoring of bradycardia.   





(2) Elevated d-dimer


Comment: 


- Unknown cause, CTA chest negative for PE and LE doppler negative for DVT.    





(3) Pulmonary edema


Comment: 


- On Ct scan with signs of pulmonary hypertension


- Will not diurese at thie time due to no obvious signs of fluid overload.    





(4) HTN (hypertension)


Comment: 


- Controlled


- Continue decreased metoprolol, amlodipine, enalapril   





(5) Moderate dementia with behavioral disturbance


Comment: 


- PRN haldol for agitation discontinued today. We will try Risperidone PO. 


- No longer requiring safwety monitor. Bed alarm in place. 


- Much less combative today   





(6) DNR (do not resuscitate)








(7) DVT prophylaxis


Comment: 


- Heparin SubQ.    


Status and Disposition: 





Inpatient for evaluation of syncope. Will likely need GRACE prior to returning to 

[place]. 


Attending: Rajeev Escamilla

## 2019-01-03 LAB
ANION GAP SERPL CALC-SCNC: 7 MMOL/L (ref 2–11)
BUN SERPL-MCNC: 36 MG/DL (ref 6–24)
BUN/CREAT SERPL: 33.6 (ref 8–20)
CALCIUM SERPL-MCNC: 9 MG/DL (ref 8.6–10.3)
CHLORIDE SERPL-SCNC: 109 MMOL/L (ref 101–111)
GLUCOSE SERPL-MCNC: 125 MG/DL (ref 70–100)
HCO3 SERPL-SCNC: 26 MMOL/L (ref 22–32)
POTASSIUM SERPL-SCNC: 3.7 MMOL/L (ref 3.5–5)
SODIUM SERPL-SCNC: 142 MMOL/L (ref 135–145)

## 2019-01-03 RX ADMIN — MEMANTINE HYDROCHLORIDE SCH MG: 10 TABLET ORAL at 22:35

## 2019-01-03 RX ADMIN — ENALAPRIL MALEATE SCH MG: 5 TABLET ORAL at 09:55

## 2019-01-03 RX ADMIN — CALCIUM SCH MG: 500 TABLET ORAL at 22:35

## 2019-01-03 RX ADMIN — METOPROLOL TARTRATE SCH MG: 25 TABLET, FILM COATED ORAL at 22:35

## 2019-01-03 RX ADMIN — ATORVASTATIN CALCIUM SCH MG: 10 TABLET, FILM COATED ORAL at 09:56

## 2019-01-03 RX ADMIN — AMLODIPINE BESYLATE SCH MG: 5 TABLET ORAL at 09:55

## 2019-01-03 RX ADMIN — ASPIRIN SCH MG: 81 TABLET, COATED ORAL at 09:55

## 2019-01-03 RX ADMIN — ACETAMINOPHEN PRN MG: 325 TABLET ORAL at 22:35

## 2019-01-03 RX ADMIN — HEPARIN SODIUM SCH UNITS: 5000 INJECTION INTRAVENOUS; SUBCUTANEOUS at 22:35

## 2019-01-03 RX ADMIN — NYSTATIN SCH UNITS: 100000 SUSPENSION ORAL at 13:04

## 2019-01-03 RX ADMIN — NYSTATIN SCH UNITS: 100000 SUSPENSION ORAL at 09:56

## 2019-01-03 RX ADMIN — METOPROLOL TARTRATE SCH MG: 25 TABLET, FILM COATED ORAL at 09:56

## 2019-01-03 RX ADMIN — TIOTROPIUM BROMIDE SCH CAP: 18 CAPSULE ORAL; RESPIRATORY (INHALATION) at 07:21

## 2019-01-03 RX ADMIN — MEMANTINE HYDROCHLORIDE SCH MG: 10 TABLET ORAL at 09:55

## 2019-01-03 RX ADMIN — NYSTATIN SCH UNITS: 100000 SUSPENSION ORAL at 17:27

## 2019-01-03 RX ADMIN — NYSTATIN SCH UNITS: 100000 SUSPENSION ORAL at 22:35

## 2019-01-03 RX ADMIN — CALCIUM SCH MG: 500 TABLET ORAL at 09:56

## 2019-01-03 RX ADMIN — Medication SCH UNITS: at 09:56

## 2019-01-03 RX ADMIN — HEPARIN SODIUM SCH UNITS: 5000 INJECTION INTRAVENOUS; SUBCUTANEOUS at 05:21

## 2019-01-03 RX ADMIN — HEPARIN SODIUM SCH UNITS: 5000 INJECTION INTRAVENOUS; SUBCUTANEOUS at 13:04

## 2019-01-03 RX ADMIN — Medication SCH MG: at 09:56

## 2019-01-03 NOTE — PN
Subjective


Date of Service: 01/03/19


Interval History: 








Resting in bed on assessment. 





Patient denies pain, but full ROS is limited due to patient confusion. 





He is alert to self. 





Patient is cooperative with assessment. 





He has had no reports of being combative.


Family History: Unchanged from Admission


Social History: Unchanged from Admission


Past Medical History: Unchanged from Admission





Objective


Active Medications: 








Acetaminophen (Tylenol Tab*)  650 mg PO Q4H PRN


   PRN Reason: PAIN/FEVER


   Last Admin: 01/02/19 23:16 Dose:  650 mg


Albuterol (Ventolin Hfa Inhaler*)  2 puff INH Q4H PRN


   PRN Reason: SHORTNESS OF BREATH


Amlodipine Besylate (Norvasc Tab*)  5 mg PO DAILY [facility]


   Last Admin: 01/03/19 09:55 Dose:  5 mg


Aspirin (Aspirin Ec Tab*)  81 mg PO DAILY [facility]


   Last Admin: 01/03/19 09:55 Dose:  81 mg


Atorvastatin Calcium (Lipitor*)  10 mg PO DAILY [facility]


   Last Admin: 01/03/19 09:56 Dose:  10 mg


Calcium Carbonate (Calcium Carbonate Tab*)  1,250 mg PO BID [facility]


   Last Admin: 01/03/19 09:56 Dose:  1,250 mg


Cholecalciferol (Vitamin D Tab*)  1,000 units PO DAILY [facility]


   Last Admin: 01/03/19 09:56 Dose:  1,000 units


Enalapril Maleate (Vasotec Tab*)  10 mg PO DAILY [facility]


   Last Admin: 01/03/19 09:55 Dose:  10 mg


Fish Oil (Fish Oil (Nf))  1,000 mg PO DAILY [facility]; Protocol


   Last Admin: 01/03/19 09:56 Dose:  1,000 mg


Heparin Sodium (Porcine) (Heparin Vial(*))  5,000 units SUBCUT Q8HR [facility]


   Last Admin: 01/03/19 13:04 Dose:  5,000 units


Loperamide HCl (Imodium Liq*)  2 mg PO DAILY [facility]N


   PRN Reason: LOOSE STOOLS


Melatonin (Melatonin)  9 mg PO BEDTIME [facility]


   Last Admin: 01/02/19 23:17 Dose:  9 mg


Memantine (Namenda Tab*)  10 mg PO BID [facility]


   Last Admin: 01/03/19 09:55 Dose:  10 mg


Metoprolol Tartrate (Lopressor Tab*)  25 mg PO BID [facility]


   Last Admin: 01/03/19 09:56 Dose:  25 mg


Nystatin (Nystatin Suspension*)  500,000 units PO QID [facility]


   Stop: 01/09/19 00:40


   Last Admin: 01/03/19 17:27 Dose:  500,000 units


Tiotropium Bromide (Spiriva Cap.Inh*)  1 cap INH DAILY [facility]


   Last Admin: 01/03/19 07:21 Dose:  1 cap








 Vital Signs - 8 hr











  01/03/19 01/03/19





  11:35 16:25


 


Temperature 99.6 F 97.8 F


 


Pulse Rate 93 87


 


Respiratory 20 16





Rate  


 


Blood Pressure 118/64 109/59





(mmHg)  


 


O2 Sat by Pulse 94 94





Oximetry  











Oxygen Devices in Use Now: None


Appearance: NAD


Eyes: PERRLA


Ears/Nose/Mouth/Throat: Clear Oropharnyx, Mucous Membranes Moist


Neck: NL Appearance and Movements; NL JVP


Respiratory: Symmetrical Chest Expansion and Respiratory Effort, Clear to 

Auscultation


Cardiovascular: NL Sounds; No Murmurs; No JVD, RRR, No Edema


Abdominal: NL Sounds; No Tenderness; No Distention


Lymphatic: No Cervical Adenopathy


Extremities: No Edema


Skin: No Rash or Ulcers


Neurological: - - Alert to self only


Nutrition: Taking PO's


Result Diagrams: 


 12/31/18 05:31





 01/03/19 05:34


Additional Lab and Data: 





  Laboratory Results - last 24 hr











  01/03/19





  05:34


 


Sodium  142


 


Potassium  3.7


 


Chloride  109


 


Carbon Dioxide  26


 


Anion Gap  7


 


BUN  36 H


 


Creatinine  1.07


 


Est GFR ( Amer)  79.5


 


Est GFR (Non-Af Amer)  65.7


 


BUN/Creatinine Ratio  33.6 H


 


Glucose  125 H


 


Calcium  9.0














Microbiology and Other Data: 


 Microbiology











 12/30/18 05:20 Nasal Screen MRSA (PCR) - Final





 Nasal    Mrsa Detected














Assess/Plan/Problems-Billing


Assessment: 





Mr. Denis is 85 yr old male with a history significant for mod dementia, 

prostate cancer, and HTN. Patient was in his normal state of health when he had 

a prolonged episode of syncope with bradycardia and hypotension with an episode 

of diarrhea. Patient was admitted for evaluation of syncope 








- Patient Problems


(1) Bradycardia


Comment: 


- No further episodes of symptomatic bradycardia


- Donepezil discontinued and metoprolol decreased with resolution of 

bradycardia.


- No evidence of MI


- Echocardiogram shows EF 55-60% with mild AS and mild pulm htn.


- May benefit from outpatient monitoring of bradycardia.   





(2) Elevated d-dimer


Comment: 


- Unknown cause, CTA chest negative for PE and LE doppler negative for DVT.    





(3) Pulmonary edema


Comment: 


- On Ct scan with signs of pulmonary hypertension


- Will not diurese at thie time due to no obvious signs of fluid overload.    





(4) HTN (hypertension)


Comment: 


- Controlled


- Continue decreased metoprolol, amlodipine, enalapril   





(5) Moderate dementia with behavioral disturbance


Comment: 


- PRN haldol given due to agitation on 12/31 and 1/1 with improvement.


- As patient cannot be discharged to NH with Haldol IV we attempted Risperidone 

PO last evening with good effect. Will continue as same


- I placed call to [place] and discussed patient's mention and they report 

that answering question incorrectly and only being alert to self is his 

baseline.


- No longer requiring safety monitor. Bed alarm in place.    





(6) DNR (do not resuscitate)








(7) DVT prophylaxis


Comment: 


- Heparin SubQ.    


Status and Disposition: 





Inpatient for evaluation of syncope. Will likely need GRACE prior to returning to 

[place]. 


Attending: Rajeev Escamilla

## 2019-01-04 RX ADMIN — MEMANTINE HYDROCHLORIDE SCH MG: 10 TABLET ORAL at 20:21

## 2019-01-04 RX ADMIN — CALCIUM SCH MG: 500 TABLET ORAL at 09:24

## 2019-01-04 RX ADMIN — ASPIRIN SCH MG: 81 TABLET, COATED ORAL at 09:24

## 2019-01-04 RX ADMIN — HEPARIN SODIUM SCH UNITS: 5000 INJECTION INTRAVENOUS; SUBCUTANEOUS at 05:04

## 2019-01-04 RX ADMIN — METOPROLOL TARTRATE SCH MG: 25 TABLET, FILM COATED ORAL at 20:20

## 2019-01-04 RX ADMIN — NYSTATIN SCH UNITS: 100000 SUSPENSION ORAL at 17:53

## 2019-01-04 RX ADMIN — NYSTATIN SCH UNITS: 100000 SUSPENSION ORAL at 09:24

## 2019-01-04 RX ADMIN — CALCIUM SCH MG: 500 TABLET ORAL at 20:20

## 2019-01-04 RX ADMIN — METOPROLOL TARTRATE SCH MG: 25 TABLET, FILM COATED ORAL at 09:24

## 2019-01-04 RX ADMIN — ENALAPRIL MALEATE SCH MG: 5 TABLET ORAL at 09:24

## 2019-01-04 RX ADMIN — NYSTATIN SCH UNITS: 100000 SUSPENSION ORAL at 13:28

## 2019-01-04 RX ADMIN — MEMANTINE HYDROCHLORIDE SCH MG: 10 TABLET ORAL at 09:24

## 2019-01-04 RX ADMIN — Medication SCH UNITS: at 09:24

## 2019-01-04 RX ADMIN — NYSTATIN SCH UNITS: 100000 SUSPENSION ORAL at 13:32

## 2019-01-04 RX ADMIN — AMLODIPINE BESYLATE SCH MG: 5 TABLET ORAL at 09:24

## 2019-01-04 RX ADMIN — HEPARIN SODIUM SCH UNITS: 5000 INJECTION INTRAVENOUS; SUBCUTANEOUS at 22:13

## 2019-01-04 RX ADMIN — HEPARIN SODIUM SCH UNITS: 5000 INJECTION INTRAVENOUS; SUBCUTANEOUS at 13:28

## 2019-01-04 RX ADMIN — Medication SCH MG: at 09:24

## 2019-01-04 RX ADMIN — TIOTROPIUM BROMIDE SCH CAP: 18 CAPSULE ORAL; RESPIRATORY (INHALATION) at 07:07

## 2019-01-04 RX ADMIN — ATORVASTATIN CALCIUM SCH MG: 10 TABLET, FILM COATED ORAL at 09:23

## 2019-01-04 RX ADMIN — NYSTATIN SCH UNITS: 100000 SUSPENSION ORAL at 20:18

## 2019-01-04 NOTE — PN
Subjective


Date of Service: 01/04/19


Interval History: 








Patient sleeping on initial assessment, but woke to loud voice and respond "

what now". 





Per nurse and hosp aide patient has been sleeping more often today. They report 

he woke and ate breakfast, but has been napping most of the remainder of the 

day. He will wake to verbal stim.





Patient denies pain. Completed ROS difficult due to confusion.


Family History: Unchanged from Admission


Social History: Unchanged from Admission


Past Medical History: Unchanged from Admission





Objective


Active Medications: 








Acetaminophen (Tylenol Tab*)  650 mg PO Q4H PRN


   PRN Reason: PAIN/FEVER


   Last Admin: 01/03/19 22:35 Dose:  650 mg


Albuterol (Ventolin Hfa Inhaler*)  2 puff INH Q4H PRN


   PRN Reason: SHORTNESS OF BREATH


Amlodipine Besylate (Norvasc Tab*)  5 mg PO DAILY [facility]


   Last Admin: 01/04/19 09:24 Dose:  5 mg


Aspirin (Aspirin Ec Tab*)  81 mg PO DAILY [facility]


   Last Admin: 01/04/19 09:24 Dose:  81 mg


Atorvastatin Calcium (Lipitor*)  10 mg PO DAILY [facility]


   Last Admin: 01/04/19 09:23 Dose:  10 mg


Calcium Carbonate (Calcium Carbonate Tab*)  1,250 mg PO BID [facility]


   Last Admin: 01/04/19 09:24 Dose:  1,250 mg


Cholecalciferol (Vitamin D Tab*)  1,000 units PO DAILY [facility]


   Last Admin: 01/04/19 09:24 Dose:  1,000 units


Enalapril Maleate (Vasotec Tab*)  10 mg PO DAILY [facility]


   Last Admin: 01/04/19 09:24 Dose:  10 mg


Fish Oil (Fish Oil (Nf))  1,000 mg PO DAILY [facility]; Protocol


   Last Admin: 01/04/19 09:24 Dose:  1,000 mg


Heparin Sodium (Porcine) (Heparin Vial(*))  5,000 units SUBCUT Q8HR [facility]


   Last Admin: 01/04/19 13:28 Dose:  5,000 units


Loperamide HCl (Imodium Liq*)  2 mg PO DAILY PRN


   PRN Reason: LOOSE STOOLS


Melatonin (Melatonin)  9 mg PO BEDTIME [facility]


   Last Admin: 01/03/19 22:35 Dose:  9 mg


Memantine (Namenda Tab*)  10 mg PO BID [facility]


   Last Admin: 01/04/19 09:24 Dose:  10 mg


Metoprolol Tartrate (Lopressor Tab*)  25 mg PO BID [facility]


   Last Admin: 01/04/19 09:24 Dose:  25 mg


Nystatin (Nystatin Suspension*)  500,000 units PO QID [facility]


   Stop: 01/09/19 00:40


   Last Admin: 01/04/19 17:53 Dose:  500,000 units


Risperidone (Risperdal)  0.25 mg PO BEDTIME [facility]


Tiotropium Bromide (Spiriva Cap.Inh*)  1 cap INH DAILY [facility]


   Last Admin: 01/04/19 07:07 Dose:  1 cap








 Vital Signs - 8 hr











  01/04/19 01/04/19





  11:29 15:49


 


Temperature 97.1 F 97.7 F


 


Pulse Rate 77 92


 


Respiratory 16 16





Rate  


 


Blood Pressure 103/62 98/72





(mmHg)  


 


O2 Sat by Pulse 93 93





Oximetry  











Oxygen Devices in Use Now: None


Appearance: NAD


Eyes: PERRLA


Ears/Nose/Mouth/Throat: Clear Oropharnyx, Mucous Membranes Moist


Neck: NL Appearance and Movements; NL JVP


Respiratory: Symmetrical Chest Expansion and Respiratory Effort, Clear to 

Auscultation


Cardiovascular: NL Sounds; No Murmurs; No JVD, RRR, No Edema


Abdominal: NL Sounds; No Tenderness; No Distention


Lymphatic: No Cervical Adenopathy


Extremities: No Edema


Skin: No Rash or Ulcers


Neurological: - - Alert to self. Will not follow commands for full neuro exam, 

but squeezed eye tightly and pulls away from stimuli evenly


Nutrition: Taking PO's


Result Diagrams: 


 12/31/18 05:31





 01/03/19 05:34


Additional Lab and Data: 





  


Microbiology and Other Data: 


 Microbiology











 12/30/18 05:20 Nasal Screen MRSA (PCR) - Final





 Nasal    Mrsa Detected














Assess/Plan/Problems-Billing


Assessment: 





Mr. Denis is 85 yr old male with a history significant for mod dementia, 

prostate cancer, and HTN. Patient was in his normal state of health when he had 

a prolonged episode of syncope with bradycardia and hypotension with an episode 

of diarrhea. Patient was admitted for evaluation of syncope 








- Patient Problems


(1) Lethargy


Comment: 


- Per staff patient has been sleeping more today. 


- No focal deficits and responds to voice


- Patient previously received Haldol on 12/31 and 1/1. In addition has had 

Risperidone on 1/2 and 1/3. I suspected this could be contributing to lethargy. 

I considered stopping Risperidone but patient has had good response in regards 

to agitation, therefore, I have decreased the dose to 0.25 mg at bedtime. He 

should be monitored closely and may need to have this med discontinued   





(2) Bradycardia


Comment: 


- No further episodes of symptomatic bradycardia


- Donepezil discontinued and metoprolol decreased with resolution of 

bradycardia.


- No evidence of MI


- Echocardiogram shows EF 55-60% with mild AS and mild pulm htn.


- May benefit from outpatient monitoring of bradycardia.   





(3) HTN (hypertension)


Comment: 


- Controlled


- Continue decreased metoprolol, amlodipine, enalapril   





(4) Moderate dementia with behavioral disturbance


Comment: 


- PRN haldol given due to agitation on 12/31 and 1/1 with improvement.


- As patient cannot be discharged to NH with Haldol IV we attempted Risperidone 

PO last evening with good effect. Will continue as same


- I placed call to [place] and discussed patient's mention and they report 

that answering question incorrectly and only being alert to self is his 

baseline.


- No longer requiring safety monitor. Bed alarm in place.    





(5) DVT prophylaxis


Comment: 


- Heparin SubQ.    





(6) DNR (do not resuscitate)





Status and Disposition: 





Inpatient for evaluation of syncope. Will likely need GRACE prior to returning to 

[place].

## 2019-01-05 LAB
ANION GAP SERPL CALC-SCNC: 7 MMOL/L (ref 2–11)
BASOPHILS # BLD AUTO: 0 10^3/UL (ref 0–0.2)
BUN SERPL-MCNC: 34 MG/DL (ref 6–24)
BUN/CREAT SERPL: 34.3 (ref 8–20)
CALCIUM SERPL-MCNC: 9 MG/DL (ref 8.6–10.3)
CHLORIDE SERPL-SCNC: 111 MMOL/L (ref 101–111)
EOSINOPHIL # BLD AUTO: 0.2 10^3/UL (ref 0–0.6)
GLUCOSE SERPL-MCNC: 132 MG/DL (ref 70–100)
HCO3 SERPL-SCNC: 25 MMOL/L (ref 22–32)
HCT VFR BLD AUTO: 36 % (ref 42–52)
HGB BLD-MCNC: 11.8 G/DL (ref 14–18)
LYMPHOCYTES # BLD AUTO: 1 10^3/UL (ref 1–4.8)
MCH RBC QN AUTO: 30 PG (ref 27–31)
MCHC RBC AUTO-ENTMCNC: 33 G/DL (ref 31–36)
MCV RBC AUTO: 92 FL (ref 80–94)
MONOCYTES # BLD AUTO: 1.1 10^3/UL (ref 0–0.8)
NEUTROPHILS # BLD AUTO: 5.9 10^3/UL (ref 1.5–7.7)
NRBC # BLD AUTO: 0 10^3/UL
NRBC BLD QL AUTO: 0
PLATELET # BLD AUTO: 201 10^3/UL (ref 150–450)
POTASSIUM SERPL-SCNC: 4 MMOL/L (ref 3.5–5)
RBC # BLD AUTO: 3.88 10^6/UL (ref 4–5.4)
SODIUM SERPL-SCNC: 143 MMOL/L (ref 135–145)
WBC # BLD AUTO: 8.3 10^3/UL (ref 3.5–10.8)

## 2019-01-05 RX ADMIN — HEPARIN SODIUM SCH UNITS: 5000 INJECTION INTRAVENOUS; SUBCUTANEOUS at 14:26

## 2019-01-05 RX ADMIN — HEPARIN SODIUM SCH UNITS: 5000 INJECTION INTRAVENOUS; SUBCUTANEOUS at 21:23

## 2019-01-05 RX ADMIN — CALCIUM SCH MG: 500 TABLET ORAL at 21:20

## 2019-01-05 RX ADMIN — TIOTROPIUM BROMIDE SCH: 18 CAPSULE ORAL; RESPIRATORY (INHALATION) at 08:15

## 2019-01-05 RX ADMIN — MEMANTINE HYDROCHLORIDE SCH MG: 10 TABLET ORAL at 21:23

## 2019-01-05 RX ADMIN — ASPIRIN SCH MG: 81 TABLET, COATED ORAL at 08:02

## 2019-01-05 RX ADMIN — MEMANTINE HYDROCHLORIDE SCH MG: 10 TABLET ORAL at 08:02

## 2019-01-05 RX ADMIN — NYSTATIN SCH UNITS: 100000 SUSPENSION ORAL at 08:02

## 2019-01-05 RX ADMIN — NYSTATIN SCH UNITS: 100000 SUSPENSION ORAL at 17:48

## 2019-01-05 RX ADMIN — NYSTATIN SCH UNITS: 100000 SUSPENSION ORAL at 14:25

## 2019-01-05 RX ADMIN — NYSTATIN SCH UNITS: 100000 SUSPENSION ORAL at 21:23

## 2019-01-05 RX ADMIN — Medication SCH UNITS: at 08:02

## 2019-01-05 RX ADMIN — CALCIUM SCH MG: 500 TABLET ORAL at 08:02

## 2019-01-05 RX ADMIN — ENALAPRIL MALEATE SCH MG: 5 TABLET ORAL at 08:02

## 2019-01-05 RX ADMIN — METOPROLOL TARTRATE SCH MG: 25 TABLET, FILM COATED ORAL at 08:02

## 2019-01-05 RX ADMIN — Medication SCH MG: at 08:02

## 2019-01-05 RX ADMIN — ATORVASTATIN CALCIUM SCH MG: 10 TABLET, FILM COATED ORAL at 08:02

## 2019-01-05 RX ADMIN — METOPROLOL TARTRATE SCH MG: 25 TABLET, FILM COATED ORAL at 21:20

## 2019-01-05 RX ADMIN — HEPARIN SODIUM SCH UNITS: 5000 INJECTION INTRAVENOUS; SUBCUTANEOUS at 05:35

## 2019-01-05 RX ADMIN — AMLODIPINE BESYLATE SCH MG: 5 TABLET ORAL at 08:02

## 2019-01-05 NOTE — PN
Subjective


Date of Service: 01/05/19


Interval History: 








Pt sleeping on assessment - opening eyes to my voice. He tells me to "go away" 

and "dont touch me" aggressively


Family History: Unchanged from Admission


Social History: Unchanged from Admission


Past Medical History: Unchanged from Admission





Objective


Active Medications: 








Acetaminophen (Tylenol Tab*)  650 mg PO Q4H PRN


   PRN Reason: PAIN/FEVER


   Last Admin: 01/03/19 22:35 Dose:  650 mg


Albuterol (Ventolin Hfa Inhaler*)  2 puff INH Q4H PRN


   PRN Reason: SHORTNESS OF BREATH


Amlodipine Besylate (Norvasc Tab*)  5 mg PO DAILY [facility]


   Last Admin: 01/05/19 08:02 Dose:  5 mg


Aspirin (Aspirin Ec Tab*)  81 mg PO DAILY [facility]


   Last Admin: 01/05/19 08:02 Dose:  81 mg


Atorvastatin Calcium (Lipitor*)  10 mg PO DAILY [facility]


   Last Admin: 01/05/19 08:02 Dose:  10 mg


Calcium Carbonate (Calcium Carbonate Tab*)  1,250 mg PO BID [facility]


   Last Admin: 01/05/19 08:02 Dose:  1,250 mg


Cholecalciferol (Vitamin D Tab*)  1,000 units PO DAILY [facility]


   Last Admin: 01/05/19 08:02 Dose:  1,000 units


Enalapril Maleate (Vasotec Tab*)  10 mg PO DAILY [facility]


   Last Admin: 01/05/19 08:02 Dose:  10 mg


Fish Oil (Fish Oil (Nf))  1,000 mg PO DAILY [facility]; Protocol


   Last Admin: 01/05/19 08:02 Dose:  1,000 mg


Heparin Sodium (Porcine) (Heparin Vial(*))  5,000 units SUBCUT Q8HR [facility]


   Last Admin: 01/05/19 14:26 Dose:  5,000 units


Loperamide HCl (Imodium Liq*)  2 mg PO DAILY PRN


   PRN Reason: LOOSE STOOLS


Melatonin (Melatonin)  9 mg PO BEDTIME [facility]


   Last Admin: 01/04/19 20:19 Dose:  9 mg


Memantine (Namenda Tab*)  10 mg PO BID [facility]


   Last Admin: 01/05/19 08:02 Dose:  10 mg


Metoprolol Tartrate (Lopressor Tab*)  25 mg PO BID [facility]


   Last Admin: 01/05/19 08:02 Dose:  25 mg


Nystatin (Nystatin Suspension*)  500,000 units PO QID [facility]


   Stop: 01/09/19 00:40


   Last Admin: 01/05/19 14:25 Dose:  500,000 units


Risperidone (Risperdal)  0.25 mg PO BEDTIME [facility]


   Last Admin: 01/04/19 20:19 Dose:  0.25 mg


Tiotropium Bromide (Spiriva Cap.Inh*)  1 cap INH DAILY [facility]


   Last Admin: 01/05/19 08:15 Dose:  Not Given








 Vital Signs - 8 hr











  01/05/19





  08:00


 


Respiratory 18





Rate 











Oxygen Devices in Use Now: None


Appearance: 84 yo male laying in bed confused and aggressive.  PATIENT WOULD 

NOT ALLOW EXAM


Lines/Tubes/Other Access: Clean, Dry and Intact Peripheral IV


Nutrition: Taking PO's


Result Diagrams: 


 01/05/19 06:23





 01/05/19 06:23


Additional Lab and Data: 





  


Microbiology and Other Data: 


 Microbiology











 12/30/18 05:20 Nasal Screen MRSA (PCR) - Final





 Nasal    Mrsa Detected











EKG Data: 





ns no acute st t change 





Assess/Plan/Problems-Billing


Assessment: 





Mr. Denis is 85 yr old male with a history significant for mod dementia, 

prostate cancer, and HTN. Patient was in his normal state of health when he had 

a prolonged episode of syncope with bradycardia and hypotension with an episode 

of diarrhea. Patient was admitted for evaluation of syncope 








- Patient Problems


(1) Bradycardia


Comment: 


- No further episodes of symptomatic bradycardia


- Donepezil discontinued and metoprolol decreased with resolution of 

bradycardia.


- No evidence of MI


- Echocardiogram shows EF 55-60% with mild AS and mild pulm htn.


- May benefit from outpatient monitoring of bradycardia.   





(2) Moderate dementia with behavioral disturbance


Comment: 


- continues to be aggressive but is improving


- PRN haldol given due to agitation on 12/31 and 1/1 with improvement. 


- As patient cannot be discharged to NH with Haldol IV; attempting Risperidone 

PO which seems to have good effect. Will continue 


- [place] reports pt is alert to self only at baseline


- No longer requiring safety monitor. Bed alarm in place.    





(3) Lethargy


Comment: 


- improving


- No focal deficits and responds to voice


- Risperidone decreased to 0.25 mg at bedtime. Continue monitoring    





(4) HTN (hypertension)


Comment: 


- Controlled


- Continue decreased metoprolol, amlodipine, enalapril   





(5) DNR (do not resuscitate)








(6) DVT prophylaxis


Comment: 


- Heparin SubQ.    


Status and Disposition: 





Inpatient for evaluation of syncope. Will likely need GRACE prior to returning to 

[place].

## 2019-01-06 RX ADMIN — HEPARIN SODIUM SCH: 5000 INJECTION INTRAVENOUS; SUBCUTANEOUS at 21:28

## 2019-01-06 RX ADMIN — NYSTATIN SCH APPLIC: 100000 POWDER TOPICAL at 14:03

## 2019-01-06 RX ADMIN — MEMANTINE HYDROCHLORIDE SCH MG: 10 TABLET ORAL at 21:22

## 2019-01-06 RX ADMIN — TIOTROPIUM BROMIDE SCH: 18 CAPSULE ORAL; RESPIRATORY (INHALATION) at 07:29

## 2019-01-06 RX ADMIN — METOPROLOL TARTRATE SCH MG: 25 TABLET, FILM COATED ORAL at 09:55

## 2019-01-06 RX ADMIN — HEPARIN SODIUM SCH UNITS: 5000 INJECTION INTRAVENOUS; SUBCUTANEOUS at 06:12

## 2019-01-06 RX ADMIN — HEPARIN SODIUM SCH UNITS: 5000 INJECTION INTRAVENOUS; SUBCUTANEOUS at 14:02

## 2019-01-06 RX ADMIN — NYSTATIN SCH UNITS: 100000 SUSPENSION ORAL at 17:42

## 2019-01-06 RX ADMIN — NYSTATIN SCH UNITS: 100000 SUSPENSION ORAL at 09:56

## 2019-01-06 RX ADMIN — NYSTATIN SCH: 100000 POWDER TOPICAL at 21:28

## 2019-01-06 RX ADMIN — NYSTATIN SCH: 100000 SUSPENSION ORAL at 21:28

## 2019-01-06 RX ADMIN — CALCIUM SCH: 500 TABLET ORAL at 21:28

## 2019-01-06 RX ADMIN — NYSTATIN SCH UNITS: 100000 SUSPENSION ORAL at 14:02

## 2019-01-06 RX ADMIN — Medication SCH UNITS: at 09:55

## 2019-01-06 RX ADMIN — NYSTATIN SCH APPLIC: 100000 POWDER TOPICAL at 06:13

## 2019-01-06 RX ADMIN — ASPIRIN SCH MG: 81 TABLET, COATED ORAL at 09:55

## 2019-01-06 RX ADMIN — ATORVASTATIN CALCIUM SCH MG: 10 TABLET, FILM COATED ORAL at 09:55

## 2019-01-06 RX ADMIN — AMLODIPINE BESYLATE SCH MG: 5 TABLET ORAL at 09:55

## 2019-01-06 RX ADMIN — ENALAPRIL MALEATE SCH MG: 5 TABLET ORAL at 09:55

## 2019-01-06 RX ADMIN — NYSTATIN SCH APPLIC: 100000 POWDER TOPICAL at 09:55

## 2019-01-06 RX ADMIN — Medication SCH MG: at 09:54

## 2019-01-06 RX ADMIN — CALCIUM SCH MG: 500 TABLET ORAL at 09:55

## 2019-01-06 RX ADMIN — MEMANTINE HYDROCHLORIDE SCH MG: 10 TABLET ORAL at 09:55

## 2019-01-06 RX ADMIN — METOPROLOL TARTRATE SCH MG: 25 TABLET, FILM COATED ORAL at 21:22

## 2019-01-06 NOTE — PN
Subjective


Date of Service: 01/06/19


Interval History: 








Pt laying in bed sleeping; awakes to voice easily. Yells "stop bothering me". 





Per primary nurse concerned about left hand/knuckle swelling. On assessment 

patient yells out in pain when fingers are extended. Continues to sleep most of 

the day








Family History: Unchanged from Admission


Social History: Unchanged from Admission


Past Medical History: Unchanged from Admission





Objective


Active Medications: 








Acetaminophen (Tylenol Tab*)  650 mg PO Q4H PRN


   PRN Reason: PAIN/FEVER


   Last Admin: 01/03/19 22:35 Dose:  650 mg


Albuterol (Ventolin Hfa Inhaler*)  2 puff INH Q4H PRN


   PRN Reason: SHORTNESS OF BREATH


Amlodipine Besylate (Norvasc Tab*)  [facility]ILY [facility]


   Last Admin: 01/06/19 09:55 Dose:  5 mg


Aspirin (Aspirin Ec Tab*)  81 mg PO DAILY [facility]


   Last Admin: 01/06/19 09:55 Dose:  81 mg


Atorvastatin Calcium (Lipitor*)  10 mg PO DAILY [facility]


   Last Admin: 01/06/19 09:55 Dose:  10 mg


Calcium Carbonate (Calcium Carbonate Tab*)  1,250 mg PO BID [facility]


   Last Admin: 01/06/19 09:55 Dose:  1,250 mg


Cholecalciferol (Vitamin D Tab*)  1,000 units PO DAILY [facility]


   Last Admin: 01/06/19 09:55 Dose:  1,000 units


Enalapril Maleate (Vasotec Tab*)  10 mg PO DAILY [facility]


   Last Admin: 01/06/19 09:55 Dose:  10 mg


Fish Oil (Fish Oil (Nf))  1,000 mg PO DAILY [facility]; [facility]


   Last Admin: 01/06/19 09:54 Dose:  1,000 mg


Heparin Sodium (Porcine) (Heparin Vial(*))  5,000 units SUBCUT Q8HR [facility]


   Last Admin: 01/06/19 14:02 Dose:  5,000 units


Loperamide HCl (Imodium Liq*)  2 mg PO DAILY [facility]


   PRN Reason: LOOSE STOOLS


Melatonin (Melatonin)  9 mg PO BEDTIME [facility]


   Last Admin: 01/05/19 21:22 Dose:  9 mg


Memantine (Namenda Tab*)  10 mg PO BID [facility]


   Last Admin: 01/06/19 09:55 Dose:  10 mg


Metoprolol Tartrate (Lopressor Tab*)  25 mg PO BID [facility]


   Last Admin: 01/06/19 09:55 Dose:  25 mg


Nystatin (Nystatin Suspension*)  500,000 units PO QID [facility]


   Stop: 01/09/19 00:40


   Last Admin: 01/06/19 14:02 Dose:  500,000 units


Nystatin (Nystatin Top Powder*)  1 applic TOPICAL TID [facility]


   Last Admin: 01/06/19 14:03 Dose:  1 applic


Risperidone (Risperdal)  0.25 mg PO BEDTIME [facility]


   Last Admin: 01/05/19 21:20 Dose:  0.25 mg


Tiotropium Bromide (Spiriva Cap.Inh*)  1 cap INH DAILY [facility]


   Last Admin: 01/06/19 07:29 Dose:  Not Given








 Vital Signs - 8 hr











  01/06/19 01/06/19





  06:41 07:38


 


Temperature  98.0 F


 


Pulse Rate  97


 


Respiratory 20 16





Rate  


 


Blood Pressure  135/67





(mmHg)  


 


O2 Sat by Pulse  91





Oximetry  











Oxygen Devices in Use Now: None


Appearance: 84 yo elderly male laying in bed drowsy, awkaes to voice, confused, 

combative


Eyes: No Scleral Icterus, PERRLA


Ears/Nose/Mouth/Throat: Mucous Membranes Moist


Respiratory: Symmetrical Chest Expansion and Respiratory Effort, Clear to 

Auscultation


Cardiovascular: NL Sounds; No Murmurs; No JVD, RRR, No Edema


Abdominal: NL Sounds; No Tenderness; No Distention


Extremities: - - left hand 1,2 metatarsal is noted to have mild edema, mild 

erythema over joint. when fingers are extended pt yells out. IV placed in top 

of hand does not appear to have infiltrated


Neurological: - - confused


Lines/Tubes/Other Access: Clean, Dry and Intact Peripheral IV


Result Diagrams: 


 01/05/19 06:23





 01/05/19 06:23


Additional Lab and Data: 





  


Microbiology and Other Data: 


 Microbiology











 12/30/18 05:20 Nasal Screen MRSA (PCR) - Final





 Nasal    Mrsa Detected











EKG Data: 





ns no acute st t change 





Assess/Plan/Problems-Billing


Assessment: 





Mr. Denis is 85 yr old male with a history significant for mod dementia, 

prostate cancer, and HTN. Patient was in his normal state of health when he had 

a prolonged episode of syncope with bradycardia and hypotension with an episode 

of diarrhea. Patient was admitted for evaluation of syncope 








- Patient Problems


(1) Bradycardia


Comment: 


- No further episodes of symptomatic bradycardia


- Donepezil discontinued and metoprolol decreased with resolution of 

bradycardia.


- No evidence of MI


- Echocardiogram shows EF 55-60% with mild AS and mild pulm htn.


   





(2) Moderate dementia with behavioral disturbance


Comment: 


- continues to be aggressive but is improving since admission


- PRN haldol given due to agitation on 12/31 and 1/1 with improvement. 


- started on Risperidone PO which seems to have good effect but may be making 

him too lethargic - it was already decreased to 0.25 mg QHS and continues to 

experience lethargy, Plan to DC tonights dose and continue to monitor. May do 

better on depakote but will hold off for now d/t continue drowsiness. 


- [place] reports pt is alert to self only at baseline - concern for increased 

behaviors at [place]


- No longer requiring safety monitor. Bed alarm in place.    





(3) Swelling of joint of left hand


Comment: 


- per nursing staff worse today than yesterday, in which it was only the 2nd 

metarsal now extended to the 3rd. Plan to obtain xray as the pt yells out when 

fingers are extended. If there is no fx it could be gout vs a beginning of a 

cellulitis. Remove IV in hand; it does not appear to be caused by IV but plan 

to remove. 


Continue to monitor closely   





(4) HTN (hypertension)


Comment: 


- Controlled


- Continue decreased metoprolol, amlodipine, enalapril   





(5) DNR (do not resuscitate)








(6) DVT prophylaxis


Comment: 


- Heparin SubQ.    


Status and Disposition: 





Inpatient for evaluation of syncope. Case management is following. Pt may need 

GRACE prior to returning to [place]

## 2019-01-07 LAB
ANION GAP SERPL CALC-SCNC: 6 MMOL/L (ref 2–11)
BASOPHILS # BLD AUTO: 0.1 10^3/UL (ref 0–0.2)
BUN SERPL-MCNC: 40 MG/DL (ref 6–24)
BUN/CREAT SERPL: 38.5 (ref 8–20)
CALCIUM SERPL-MCNC: 9 MG/DL (ref 8.6–10.3)
CHLORIDE SERPL-SCNC: 112 MMOL/L (ref 101–111)
EOSINOPHIL # BLD AUTO: 0 10^3/UL (ref 0–0.6)
GLUCOSE SERPL-MCNC: 174 MG/DL (ref 70–100)
HCO3 SERPL-SCNC: 27 MMOL/L (ref 22–32)
HCT VFR BLD AUTO: 34 % (ref 42–52)
HGB BLD-MCNC: 11.1 G/DL (ref 14–18)
LYMPHOCYTES # BLD AUTO: 1 10^3/UL (ref 1–4.8)
MCH RBC QN AUTO: 30 PG (ref 27–31)
MCHC RBC AUTO-ENTMCNC: 33 G/DL (ref 31–36)
MCV RBC AUTO: 92 FL (ref 80–94)
MONOCYTES # BLD AUTO: 1.7 10^3/UL (ref 0–0.8)
NEUTROPHILS # BLD AUTO: 11.1 10^3/UL (ref 1.5–7.7)
NRBC # BLD AUTO: 0 10^3/UL
NRBC BLD QL AUTO: 0
PLATELET # BLD AUTO: 252 10^3/UL (ref 150–450)
POTASSIUM SERPL-SCNC: 4.1 MMOL/L (ref 3.5–5)
RBC # BLD AUTO: 3.73 10^6/UL (ref 4–5.4)
SODIUM SERPL-SCNC: 145 MMOL/L (ref 135–145)
WBC # BLD AUTO: 13.9 10^3/UL (ref 3.5–10.8)

## 2019-01-07 RX ADMIN — HEPARIN SODIUM SCH UNITS: 5000 INJECTION INTRAVENOUS; SUBCUTANEOUS at 21:25

## 2019-01-07 RX ADMIN — NYSTATIN SCH UNITS: 100000 SUSPENSION ORAL at 13:34

## 2019-01-07 RX ADMIN — NYSTATIN SCH APPLIC: 100000 POWDER TOPICAL at 13:38

## 2019-01-07 RX ADMIN — HEPARIN SODIUM SCH: 5000 INJECTION INTRAVENOUS; SUBCUTANEOUS at 04:43

## 2019-01-07 RX ADMIN — ATORVASTATIN CALCIUM SCH MG: 10 TABLET, FILM COATED ORAL at 10:27

## 2019-01-07 RX ADMIN — Medication SCH UNITS: at 10:27

## 2019-01-07 RX ADMIN — AMLODIPINE BESYLATE SCH MG: 5 TABLET ORAL at 10:27

## 2019-01-07 RX ADMIN — TIOTROPIUM BROMIDE SCH CAP: 18 CAPSULE ORAL; RESPIRATORY (INHALATION) at 07:31

## 2019-01-07 RX ADMIN — NYSTATIN SCH UNITS: 100000 SUSPENSION ORAL at 21:25

## 2019-01-07 RX ADMIN — NYSTATIN SCH APPLIC: 100000 POWDER TOPICAL at 21:26

## 2019-01-07 RX ADMIN — NYSTATIN SCH UNITS: 100000 SUSPENSION ORAL at 17:15

## 2019-01-07 RX ADMIN — NYSTATIN SCH APPLIC: 100000 POWDER TOPICAL at 08:40

## 2019-01-07 RX ADMIN — METOPROLOL TARTRATE SCH MG: 25 TABLET, FILM COATED ORAL at 10:27

## 2019-01-07 RX ADMIN — Medication SCH: at 10:28

## 2019-01-07 RX ADMIN — ACETAMINOPHEN PRN MG: 325 TABLET ORAL at 01:04

## 2019-01-07 RX ADMIN — ASPIRIN SCH MG: 81 TABLET, COATED ORAL at 10:27

## 2019-01-07 RX ADMIN — METOPROLOL TARTRATE SCH MG: 25 TABLET, FILM COATED ORAL at 21:20

## 2019-01-07 RX ADMIN — MEMANTINE HYDROCHLORIDE SCH MG: 10 TABLET ORAL at 21:19

## 2019-01-07 RX ADMIN — MEMANTINE HYDROCHLORIDE SCH MG: 10 TABLET ORAL at 10:28

## 2019-01-07 RX ADMIN — CALCIUM SCH MG: 500 TABLET ORAL at 21:21

## 2019-01-07 RX ADMIN — NYSTATIN SCH UNITS: 100000 SUSPENSION ORAL at 10:27

## 2019-01-07 RX ADMIN — HEPARIN SODIUM SCH UNITS: 5000 INJECTION INTRAVENOUS; SUBCUTANEOUS at 13:34

## 2019-01-07 RX ADMIN — ENALAPRIL MALEATE SCH MG: 5 TABLET ORAL at 10:27

## 2019-01-07 RX ADMIN — CALCIUM SCH MG: 500 TABLET ORAL at 10:27

## 2019-01-07 NOTE — PN
Subjective


Date of Service: 01/07/19


Interval History: 





More lethargic today 


Pain in right wrist persists 


having more trouble with swallowing so switched to purred diet 


Family History: Unchanged from Admission


Social History: Unchanged from Admission


Past Medical History: Unchanged from Admission





Objective


Active Medications: 








Acetaminophen (Tylenol Tab*)  650 mg PO Q4H PRN


   PRN Reason: PAIN/FEVER


   Last Admin: 01/07/19 01:04 Dose:  650 mg


Albuterol (Ventolin Hfa Inhaler*)  2 puff INH Q4H PRN


   PRN Reason: SHORTNESS OF BREATH


Amlodipine Besylate (Norvasc Tab*)  5 mg PO DAILY [facility]


   Last Admin: 01/07/19 10:27 Dose:  5 mg


Aspirin (Aspirin Ec Tab*)  81 mg PO DAILY [facility]


   Last Admin: 01/07/19 10:27 Dose:  81 mg


Atorvastatin Calcium (Lipitor*)  10 mg PO DAILY [facility]


   Last Admin: 01/07/19 10:27 Dose:  10 mg


Calcium Carbonate (Calcium Carbonate Tab*)  1,250 mg PO BID [facility]


   Last Admin: 01/07/19 10:27 Dose:  1,250 mg


Cholecalciferol (Vitamin D Tab*)  1,000 units PO DAILY [facility]


   Last Admin: 01/07/19 10:27 Dose:  1,000 units


Enalapril Maleate (Vasotec Tab*)  10 mg PO DAILY [facility]


   Last Admin: 01/07/19 10:27 Dose:  10 mg


Fish Oil (Fish Oil (Nf))  1,000 mg PO DAILY [facility]; Protocol


   Last Admin: 01/07/19 10:28 Dose:  Not Given


Heparin Sodium (Porcine) (Heparin Vial(*))  5,000 units SUBCUT Q8HR [facility]


   Last Admin: 01/07/19 13:34 Dose:  5,000 units


Loperamide HCl (Imodium Liq*)  2 mg PO DAILY PRN


   PRN Reason: LOOSE STOOLS


Melatonin (Melatonin)  9 mg PO BEDTIME [facility]


   Last Admin: 01/06/19 21:22 Dose:  9 mg


Memantine (Namenda Tab*)  10 mg PO BID [facility]


   Last Admin: 01/07/19 10:28 Dose:  10 mg


Metoprolol Tartrate (Lopressor Tab*)  25 mg PO BID [facility]


   Last Admin: 01/07/19 10:27 Dose:  25 mg


Nystatin (Nystatin Suspension*)  500,000 units PO QID [facility]


   Stop: 01/09/19 00:40


   Last Admin: 01/07/19 17:15 Dose:  500,000 units


Nystatin (Nystatin Top Powder*)  1 applic TOPICAL TID [facility]


   Last Admin: 01/07/19 13:38 Dose:  1 applic


Tiotropium Bromide (Spiriva Cap.Inh*)  1 cap INH DAILY [facility]


   Last Admin: 01/07/19 07:31 Dose:  1 cap








 Vital Signs - 8 hr











  01/07/19





  11:52


 


Temperature 99.3 F


 


Pulse Rate 77


 


Respiratory 17





Rate 


 


Blood Pressure 105/56





(mmHg) 


 


O2 Sat by Pulse 93





Oximetry 











Oxygen Devices in Use Now: None


Appearance: seems sleepy but awake, NAD


Eyes: No Scleral Icterus, PERRLA


Ears/Nose/Mouth/Throat: - - dry MM


Neck: NL Appearance and Movements; NL JVP, Trachea Midline


Respiratory: Symmetrical Chest Expansion and Respiratory Effort, Clear to 

Auscultation


Cardiovascular: RRR


Abdominal: NL Sounds; No Tenderness; No Distention, No Hepatosplenomegaly


Extremities: - - lateral right wrist painful to palpation with minimal swelling 


Skin: No Rash or Ulcers


Neurological: - - AOx1 to name 


Result Diagrams: 


 01/07/19 07:22





 01/07/19 07:22


Additional Lab and Data: 





  


Microbiology and Other Data: 


 Microbiology











 12/30/18 05:20 Nasal Screen MRSA (PCR) - Final





 Nasal    Mrsa Detected











EKG Data: 





ns no acute st t change 





Assess/Plan/Problems-Billing


Assessment: 





Mr. Denis is 85 yr old male with a history significant for mod dementia, 

prostate cancer, and HTN. Patient was in his normal state of health when he had 

a prolonged episode of syncope with bradycardia and hypotension with an episode 

of diarrhea. Patient was admitted for evaluation of syncope 








- Patient Problems


(1) Fever


Comment: 101.4 overnight. Resolved with tylenol 


Urine and CXR wnl


Blood cultures pending 


Right wrist is only localizing symptom 


While he has minimal swelling over the left hand there is an IV in the hand and 

it is not painful 


Low threshhold for abx if there is any change in clinical status    





(2) Bradycardia


Comment: 


- No further episodes of symptomatic bradycardia


- Donepezil discontinued and metoprolol decreased with resolution of 

bradycardia.


- No evidence of MI


- Echocardiogram shows EF 55-60% with mild AS and mild pulm htn.


   





(3) Swelling of joint of left hand


Comment: 


Continue to monitor closely   





(4) HTN (hypertension)


Comment: 


- Controlled


- Continue decreased metoprolol, amlodipine, enalapril   





(5) Moderate dementia with behavioral disturbance


Comment: 


no continued aggression Last 


PRN haldol given due to agitation on 12/31 and 1/1


- started on Risperidone PO trialed but increased drowsiness and now 

discontinued.  


- [place] reports pt is alert to self only at baseline 


   





(6) DVT prophylaxis


Comment: 


- Heparin SubQ.    


Status and Disposition: 





. Case management is following. Pt may need GRACE prior to returning to [place]

## 2019-01-08 LAB
ANION GAP SERPL CALC-SCNC: 6 MMOL/L (ref 2–11)
ANION GAP SERPL CALC-SCNC: 8 MMOL/L (ref 2–11)
BASOPHILS # BLD AUTO: 0.1 10^3/UL (ref 0–0.2)
BUN SERPL-MCNC: 50 MG/DL (ref 6–24)
BUN SERPL-MCNC: 52 MG/DL (ref 6–24)
BUN/CREAT SERPL: 45.5 (ref 8–20)
BUN/CREAT SERPL: 46.8 (ref 8–20)
CALCIUM SERPL-MCNC: 9.1 MG/DL (ref 8.6–10.3)
CALCIUM SERPL-MCNC: 9.3 MG/DL (ref 8.6–10.3)
CHLORIDE SERPL-SCNC: 114 MMOL/L (ref 101–111)
CHLORIDE SERPL-SCNC: 116 MMOL/L (ref 101–111)
EOSINOPHIL # BLD AUTO: 0.1 10^3/UL (ref 0–0.6)
GLUCOSE SERPL-MCNC: 142 MG/DL (ref 70–100)
GLUCOSE SERPL-MCNC: 160 MG/DL (ref 70–100)
HCO3 SERPL-SCNC: 26 MMOL/L (ref 22–32)
HCO3 SERPL-SCNC: 26 MMOL/L (ref 22–32)
HCT VFR BLD AUTO: 34 % (ref 42–52)
HGB BLD-MCNC: 10.9 G/DL (ref 14–18)
LYMPHOCYTES # BLD AUTO: 1.5 10^3/UL (ref 1–4.8)
MCH RBC QN AUTO: 30 PG (ref 27–31)
MCHC RBC AUTO-ENTMCNC: 33 G/DL (ref 31–36)
MCV RBC AUTO: 92 FL (ref 80–94)
MONOCYTES # BLD AUTO: 1.1 10^3/UL (ref 0–0.8)
NEUTROPHILS # BLD AUTO: 9.1 10^3/UL (ref 1.5–7.7)
NRBC # BLD AUTO: 0 10^3/UL
NRBC BLD QL AUTO: 0
PLATELET # BLD AUTO: 283 10^3/UL (ref 150–450)
POTASSIUM SERPL-SCNC: 4.3 MMOL/L (ref 3.5–5)
POTASSIUM SERPL-SCNC: 4.4 MMOL/L (ref 3.5–5)
RBC # BLD AUTO: 3.63 10^6/UL (ref 4–5.4)
SODIUM SERPL-SCNC: 148 MMOL/L (ref 135–145)
SODIUM SERPL-SCNC: 148 MMOL/L (ref 135–145)
URATE SERPL-MCNC: 7.1 MG/DL (ref 4.4–7.6)
WBC # BLD AUTO: 11.9 10^3/UL (ref 3.5–10.8)

## 2019-01-08 RX ADMIN — METOPROLOL TARTRATE SCH MG: 25 TABLET, FILM COATED ORAL at 10:39

## 2019-01-08 RX ADMIN — ATORVASTATIN CALCIUM SCH MG: 10 TABLET, FILM COATED ORAL at 10:38

## 2019-01-08 RX ADMIN — NYSTATIN SCH APPLIC: 100000 POWDER TOPICAL at 10:40

## 2019-01-08 RX ADMIN — Medication SCH: at 10:40

## 2019-01-08 RX ADMIN — METOPROLOL TARTRATE SCH MG: 25 TABLET, FILM COATED ORAL at 20:50

## 2019-01-08 RX ADMIN — HEPARIN SODIUM SCH UNITS: 5000 INJECTION INTRAVENOUS; SUBCUTANEOUS at 20:54

## 2019-01-08 RX ADMIN — ASPIRIN SCH MG: 81 TABLET, COATED ORAL at 10:39

## 2019-01-08 RX ADMIN — SODIUM CHLORIDE SCH MLS/HR: 900 IRRIGANT IRRIGATION at 12:42

## 2019-01-08 RX ADMIN — NYSTATIN SCH APPLIC: 100000 POWDER TOPICAL at 20:53

## 2019-01-08 RX ADMIN — CALCIUM SCH MG: 500 TABLET ORAL at 10:39

## 2019-01-08 RX ADMIN — NYSTATIN SCH UNITS: 100000 SUSPENSION ORAL at 10:39

## 2019-01-08 RX ADMIN — MEMANTINE HYDROCHLORIDE SCH MG: 10 TABLET ORAL at 10:39

## 2019-01-08 RX ADMIN — Medication SCH UNITS: at 10:39

## 2019-01-08 RX ADMIN — NYSTATIN SCH UNITS: 100000 SUSPENSION ORAL at 16:36

## 2019-01-08 RX ADMIN — ACETAMINOPHEN PRN MG: 325 TABLET ORAL at 05:17

## 2019-01-08 RX ADMIN — DOCUSATE SODIUM SCH MG: 100 CAPSULE, LIQUID FILLED ORAL at 20:43

## 2019-01-08 RX ADMIN — SENNOSIDES SCH TAB: 8.6 TABLET, FILM COATED ORAL at 20:52

## 2019-01-08 RX ADMIN — NYSTATIN SCH APPLIC: 100000 POWDER TOPICAL at 13:14

## 2019-01-08 RX ADMIN — HEPARIN SODIUM SCH UNITS: 5000 INJECTION INTRAVENOUS; SUBCUTANEOUS at 05:17

## 2019-01-08 RX ADMIN — HEPARIN SODIUM SCH UNITS: 5000 INJECTION INTRAVENOUS; SUBCUTANEOUS at 13:14

## 2019-01-08 RX ADMIN — MEMANTINE HYDROCHLORIDE SCH MG: 10 TABLET ORAL at 20:51

## 2019-01-08 RX ADMIN — NYSTATIN SCH UNITS: 100000 SUSPENSION ORAL at 13:14

## 2019-01-08 RX ADMIN — TIOTROPIUM BROMIDE SCH: 18 CAPSULE ORAL; RESPIRATORY (INHALATION) at 07:25

## 2019-01-08 RX ADMIN — CALCIUM SCH MG: 500 TABLET ORAL at 20:48

## 2019-01-08 RX ADMIN — NYSTATIN SCH UNITS: 100000 SUSPENSION ORAL at 20:53

## 2019-01-08 RX ADMIN — SODIUM CHLORIDE SCH MLS/HR: 900 IRRIGANT IRRIGATION at 02:51

## 2019-01-08 RX ADMIN — AMLODIPINE BESYLATE SCH MG: 5 TABLET ORAL at 10:39

## 2019-01-08 NOTE — PN
Subjective


Date of Service: 01/08/19


Interval History: 





Pt s very disoriented C/o no pain , but r wrist very tender and pt refuses to 

flex it and resists passive ROM on it.


As per d/w with Pt's nurse pot appears much more awake today. started on IVF 

last night


Family History: Unchanged from Admission


Social History: Unchanged from Admission


Past Medical History: Unchanged from Admission





Objective


Active Medications: 








Acetaminophen (Tylenol Tab*)  650 mg PO Q4H PRN


   PRN Reason: PAIN/FEVER


   Last Admin: 01/08/19 05:17 Dose:  650 mg


Albuterol (Ventolin Hfa Inhaler*)  2 puff INH Q4H PRN


   PRN Reason: SHORTNESS OF BREATH


Amlodipine Besylate (Norvasc Tab*)  5 mg PO DAILY [facility]


   Last Admin: 01/08/19 10:39 Dose:  5 mg


Aspirin (Aspirin Ec Tab*)  81 mg PO DAILY [facility]


   Last Admin: 01/08/19 10:39 Dose:  81 mg


Atorvastatin Calcium (Lipitor*)  10 mg PO DAILY [facility]


   Last Admin: 01/08/19 10:38 Dose:  10 mg


Calcium Carbonate (Calcium Carbonate Tab*)  1,250 mg PO BID [facility]


   Last Admin: 01/08/19 10:39 Dose:  1,250 mg


Cholecalciferol (Vitamin D Tab*)  1,000 units PO DAILY [facility]


   Last Admin: 01/08/19 10:39 Dose:  1,000 units


Fish Oil (Fish Oil (Nf))  1,000 mg PO DAILY [facility]; Protocol


   Last Admin: 01/08/19 10:40 Dose:  Not Given


Heparin Sodium (Porcine) (Heparin Vial(*))  5,000 units SUBCUT Q8HR [facility]


   Last Admin: 01/08/19 05:17 Dose:  5,000 units


Sodium Chloride (Ns 0.9% 1000 Ml*)  1,000 mls @ 100 mls/hr IV PER RATE [facility]


   Last Admin: 01/08/19 02:51 Dose:  100 mls/hr


Loperamide HCl (Imodium Liq*)  2 mg PO DAILY PRN


   PRN Reason: LOOSE STOOLS


Melatonin (Melatonin)  9 mg PO BEDTIME [facility]


   Last Admin: 01/07/19 21:21 Dose:  9 mg


Memantine (Namenda Tab*)  10 mg PO BID [facility]


   Last Admin: 01/08/19 10:39 Dose:  10 mg


Metoprolol Tartrate (Lopressor Tab*)  25 mg PO BID [facility]


   Last Admin: 01/08/19 10:39 Dose:  25 mg


Nystatin (Nystatin Suspension*)  500,000 units PO QID [facility]


   Stop: 01/09/19 00:40


   Last Admin: 01/08/19 10:39 Dose:  500,000 units


Nystatin (Nystatin Top Powder*)  1 applic TOPICAL TID [facility]


   Last Admin: 01/08/19 10:40 Dose:  1 applic


Tiotropium Bromide (Spiriva Cap.Inh*)  1 cap INH DAILY [facility]


   Last Admin: 01/08/19 07:25 Dose:  Not Given








 Vital Signs - 8 hr











  01/08/19 01/08/19





  03:27 07:43


 


Temperature 98.0 F 97.6 F


 


Pulse Rate 96 90


 


Respiratory 18 20





Rate  


 


Blood Pressure 118/74 117/75





(mmHg)  


 


O2 Sat by Pulse 95 93





Oximetry  











Oxygen Devices in Use Now: None


Appearance: 86 yo m in nAD, oriented to self only, unable to follow commands


Eyes: No Scleral Icterus, PERRLA


Ears/Nose/Mouth/Throat: NL Teeth, Lips, Gums, Mucous Membranes Moist


Neck: NL Appearance and Movements; NL JVP, Trachea Midline


Respiratory: Symmetrical Chest Expansion and Respiratory Effort, - - crackles 

at b/l basees


Cardiovascular: NL Sounds; No Murmurs; No JVD, RRR


Abdominal: NL Sounds; No Tenderness; No Distention, No Hepatosplenomegaly


Lymphatic: No Cervical Adenopathy


Extremities: No Clubbing, Cyanosis, - - R wist with mid edema and erythema, 

tender to palpation and on passive ROM


Skin: No Nodules or Sclerosis, - - mild erythema r wrist


Neurological: NL Muscle Strength and Tone


Result Diagrams: 


 01/08/19 05:44





 01/08/19 05:44


Additional Lab and Data: 





  


Microbiology and Other Data: 


 Microbiology











 12/30/18 05:20 Nasal Screen MRSA (PCR) - Final





 Nasal    Mrsa Detected











EKG Data: 





ns no acute st t change 





Assess/Plan/Problems-Billing


Assessment: 





Mr. Denis is 85 yr old male with a history significant for mod dementia, 

prostate cancer, and HTN. Patient was in his normal state of health when he had 

a prolonged episode of syncope with bradycardia and hypotension with an episode 

of diarrhea. Patient was admitted for evaluation of syncope 








- Patient Problems


(1) Fever


Comment: 101.4 overnight 1/7/19. Resolved with tylenol 


Urine and CXR wnl


Blood cultures pending 


Right wrist is only localizing symptom 


While he has minimal swelling over the r hand, ROM is limited. will consult 

ortho and ID.


wrist Xray and uric acid level pending


 ESR>200 concerning, but WBC are lower on no antibiotics


   





(2) Bradycardia


Comment: 


- No further episodes of symptomatic bradycardia


- Donepezil discontinued and metoprolol decreased with resolution of 

bradycardia.


- No evidence of MI


- Echocardiogram shows EF 55-60% with mild AS and mild pulm htn.


   





(3) Elevated d-dimer


Current Visit: Yes   Comment: 


- Unknown cause, CTA chest negative for PE and LE doppler negative for DVT.    





(4) Lethargy


Comment: 


- improving


-likley due to prerenal state, got sedated on Risperdal, now hypernatremic. 

started on IVF on 1/8/19 AM, will recheck Na level today at noon   





(5) Pulmonary edema


Comment: 


- On Ct scan with signs of pulmonary hypertension


- Will not diurese at thie time due to no obvious signs of fluid overload.    





(6) HTN (hypertension)


Comment: 


- Controlled


- Continue decreased metoprolol, enalapril


-d/c Norvasc foe SBP in low 100's   





(7) Moderate dementia with behavioral disturbance


Comment: 


no continued aggression 


Last PRN haldol given due to agitation on 12/31 and 1/1


- started on Risperidone PO trialed but increased drowsiness and now 

discontinued.  


- [place] reports pt is alert to self only at baseline 


   





(8) DVT prophylaxis


Comment: 


- Heparin SubQ.    


Status and Disposition: 





. Case management is following. Pt may need GRACE prior to returning to [place]

## 2019-01-08 NOTE — CONS
CC:  Dr. Sarabia

 

CONSULTATION REPORT:

 

DATE OF CONSULT:

 

CHIEF COMPLAINT:  Right wrist pain.

 

HISTORY OF PRESENT ILLNESS:  Diego is an 85-year-old man with dementia, who was admitted to the [facility] several days ago.  He has developed swelling and pain in his right wrist and I was asked to con
carie for that.  There is concern because he has an elevated sed rate and CRP as well and was febrile,
 but now his fever has gone and he has not received any antibiotics.

 

PAST MEDICAL HISTORY:  He has a past medical history significant for dementia.  He is not able to pro
vide any history.

 

PHYSICAL EXAM:  On physical exam, he is an 85-year-old man in minimal distress at rest.  On exam of h
is right wrist, there is a mild degree of erythema on the ulnar aspect of the wrist.  He does not hav
e a significant effusion, but does have tenderness at his radiocarpal joint.  He can make a fist and 
extend his fingers. His neurovascular function is intact as far as I can tell.  There is not any obvi
ous fluid collection.

 

DIAGNOSTIC STUDIES/LAB DATA:  X-rays:  AP, lateral, oblique of the right wrist show severe degenerati
ve arthritis of the radiocarpal joint and thumb CMC joint with large subchondral cyst.

 

IMPRESSION:  Right wrist pain and swelling, possibly a flare of arthritis or pseudogout.

 

PLAN:  I do not feel as though there is much fluid to aspirate at this point and he is improving a li
ttle bit with oral medication.  He is no longer febrile, so I recommend observation for now and anti-
inflammatory medication.  If this does not improve the situation over the next couple of days, I will
 see him in followup and aspirate and/or inject his wrist with cortisone.

 

 [id number]/CPS #: [id number]

## 2019-01-08 NOTE — CONS
CONSULTATION REPORT:

 

DATE OF CONSULT:  01/08/19

 

REQUESTING PHYSICIAN:  Dr. Calderón.

 

CONSULTING SERVICE:  Infectious Disease.

 

REASON FOR CONSULT:  Right wrist pain and inflammation.

 

IMPRESSION:

1.  Right wrist pain, tenderness, and warmth and erythema on exam.  No fracture 
on x-ray.  Differential diagnosis in an elderly man who has been in the 
hospital for a few days, I think pseudogout is high on the list followed by gout
, less likely septic arthritis, though that is still a possibility.  There is a 
little bit more lateral erythema, so possible that it is a tenosynovitis versus 
cellulitis, again I think, less likely.

2.  Dementia.

3.  Syncope.

 

RECOMMENDATIONS:  Agree with holding antibiotics and following his wrist exam, 
Orthopedics is going to see him today.  His white count is down and his fever 
has resolved without antibiotics.  Consideration would be aspiration and if 
there are crystals or at least not an infection, he could have a corticosteroid 
injection.

 

HISTORY OF PRESENT ILLNESS:  This is an 85-year-old man in the hospital for a 
few days after an episode of syncope.  Over the last couple of days, he has had 
some right wrist pain and tenderness.  He cannot provide any history of this 
illness given his baseline mental status.  Those details are obtained instead 
from discussion with Dr. Calderón or review of the medical record.  Over the last 
couple of days, he has had increasing pain, redness, right wrist. He had had a 
fever 2 nights ago, none since.  He had a white count on 01/07/19 of 13, it is 
down to an 11 today.  CRP yesterday was 230.

 

He was admitted here with syncope, happened at [facility].  He has had cardiac 
evaluation, which has been unrevealing.  He had a wrist x-ray today, the final 
report is pending.  By my read, there is no fracture.

 

PAST MEDICAL HISTORY:

1.  Dementia.

2.  Hypertension.

3.  Hyperlipidemia.

4.  Peripheral vascular disease.

 

MEDICATIONS:

1.  Tylenol.

2.  Albuterol.

3.  Aspirin.

4.  Lipitor.

5.  Calcium.

6.  Cholecalciferol.

7.  Heparin subcutaneous injection.

8.  Loperamide as needed.

9.  Melatonin.

10.  Memantine.

11.  Metoprolol.

12.  Nystatin topical.

13.  Spiriva.

 

ALLERGIES:  No known drug allergies.

 

FAMILY HISTORY:  Unobtainable.

 

SOCIAL HISTORY:  Lives at [facility].

 

REVIEW OF SYSTEMS:  Unobtainable.

 

PHYSICAL EXAM:  Vital Signs:  Temperature is 36.4, heart rate 90, respiratory 
rate 20, blood pressure 117/75, oxygen saturation 93% on room air.  In general, 
he is awake, not in distress.  Neurologic:  He is oriented x1, answers some 
questions, does not follow commands.  HEENT:  There is no conjunctival 
hemorrhage.  Oropharynx without lesions.  Neck is supple, without mass.  Heart 
is regular rate and rhythm, without murmurs, rubs, or gallops.  Lungs are clear 
to auscultation bilaterally. Abdomen:  Soft, nontender, nondistended.  Bowel 
sounds present.  Skin:  There is no rash or splinter hemorrhage.  
Musculoskeletal:  There is no spine tenderness to palpation.  There is right 
distal forearm edema, mild erythema, warmth, tenderness about the wrist joint 
with pain with flexion and extension of the wrist.  No other joint synovitis.

 

DIAGNOSTIC STUDIES/LAB DATA:  White blood cell count 11, hemoglobin 10, 
platelets 283, creatinine is 1.1. Please see impressions and recommendations 
outlined above, which I discussed with Dr. Calderón.

 

Thank you for asking me to see Mr. Denis in consultation.

 

 [id number]/[id number] #: [id number]

MTDD

## 2019-01-09 LAB
ANION GAP SERPL CALC-SCNC: 5 MMOL/L (ref 2–11)
BASOPHILS # BLD AUTO: 0.1 10^3/UL (ref 0–0.2)
BUN SERPL-MCNC: 49 MG/DL (ref 6–24)
BUN/CREAT SERPL: 51.6 (ref 8–20)
CALCIUM SERPL-MCNC: 8.7 MG/DL (ref 8.6–10.3)
CHLORIDE SERPL-SCNC: 113 MMOL/L (ref 101–111)
EOSINOPHIL # BLD AUTO: 0.3 10^3/UL (ref 0–0.6)
GLUCOSE SERPL-MCNC: 133 MG/DL (ref 70–100)
HCO3 SERPL-SCNC: 26 MMOL/L (ref 22–32)
HCT VFR BLD AUTO: 32 % (ref 42–52)
HGB BLD-MCNC: 10.2 G/DL (ref 14–18)
LYMPHOCYTES # BLD AUTO: 1.4 10^3/UL (ref 1–4.8)
MCH RBC QN AUTO: 30 PG (ref 27–31)
MCHC RBC AUTO-ENTMCNC: 32 G/DL (ref 31–36)
MCV RBC AUTO: 93 FL (ref 80–94)
MONOCYTES # BLD AUTO: 0.8 10^3/UL (ref 0–0.8)
NEUTROPHILS # BLD AUTO: 6.4 10^3/UL (ref 1.5–7.7)
NRBC # BLD AUTO: 0 10^3/UL
NRBC BLD QL AUTO: 0
PLATELET # BLD AUTO: 285 10^3/UL (ref 150–450)
POTASSIUM SERPL-SCNC: 4.1 MMOL/L (ref 3.5–5)
RBC # BLD AUTO: 3.38 10^6/UL (ref 4–5.4)
SODIUM SERPL-SCNC: 144 MMOL/L (ref 135–145)
WBC # BLD AUTO: 9 10^3/UL (ref 3.5–10.8)

## 2019-01-09 RX ADMIN — HEPARIN SODIUM SCH UNITS: 5000 INJECTION INTRAVENOUS; SUBCUTANEOUS at 05:56

## 2019-01-09 RX ADMIN — NAPROXEN SCH MG: 250 TABLET ORAL at 23:13

## 2019-01-09 RX ADMIN — DOCUSATE SODIUM SCH MG: 100 CAPSULE, LIQUID FILLED ORAL at 08:15

## 2019-01-09 RX ADMIN — MEMANTINE HYDROCHLORIDE SCH MG: 10 TABLET ORAL at 23:16

## 2019-01-09 RX ADMIN — NYSTATIN SCH APPLIC: 100000 POWDER TOPICAL at 12:35

## 2019-01-09 RX ADMIN — METOPROLOL TARTRATE SCH MG: 25 TABLET, FILM COATED ORAL at 23:16

## 2019-01-09 RX ADMIN — NYSTATIN SCH APPLIC: 100000 POWDER TOPICAL at 08:21

## 2019-01-09 RX ADMIN — SENNOSIDES SCH TAB: 8.6 TABLET, FILM COATED ORAL at 23:08

## 2019-01-09 RX ADMIN — HEPARIN SODIUM SCH UNITS: 5000 INJECTION INTRAVENOUS; SUBCUTANEOUS at 23:18

## 2019-01-09 RX ADMIN — Medication SCH MG: at 08:15

## 2019-01-09 RX ADMIN — TIOTROPIUM BROMIDE SCH: 18 CAPSULE ORAL; RESPIRATORY (INHALATION) at 07:52

## 2019-01-09 RX ADMIN — Medication SCH UNITS: at 08:15

## 2019-01-09 RX ADMIN — NAPROXEN SCH MG: 250 TABLET ORAL at 12:33

## 2019-01-09 RX ADMIN — METOPROLOL TARTRATE SCH: 25 TABLET, FILM COATED ORAL at 08:21

## 2019-01-09 RX ADMIN — NYSTATIN SCH APPLIC: 100000 POWDER TOPICAL at 23:18

## 2019-01-09 RX ADMIN — ASPIRIN SCH MG: 81 TABLET, COATED ORAL at 08:15

## 2019-01-09 RX ADMIN — DOCUSATE SODIUM SCH MG: 100 CAPSULE, LIQUID FILLED ORAL at 23:09

## 2019-01-09 RX ADMIN — MEMANTINE HYDROCHLORIDE SCH MG: 10 TABLET ORAL at 08:15

## 2019-01-09 RX ADMIN — HEPARIN SODIUM SCH UNITS: 5000 INJECTION INTRAVENOUS; SUBCUTANEOUS at 12:33

## 2019-01-09 RX ADMIN — CALCIUM SCH MG: 500 TABLET ORAL at 23:17

## 2019-01-09 RX ADMIN — CALCIUM SCH MG: 500 TABLET ORAL at 08:15

## 2019-01-09 RX ADMIN — ATORVASTATIN CALCIUM SCH MG: 10 TABLET, FILM COATED ORAL at 08:16

## 2019-01-09 NOTE — PN
Subjective


Date of Service: 01/09/19


Interval History: 





Afebrile in the past 24H. Denies pain, but very poor historian due to dementia. 

Pulled IV last night, more awake and verbal this aM


Family History: Unchanged from Admission


Social History: Unchanged from Admission


Past Medical History: Unchanged from Admission





Objective


Active Medications: 








Acetaminophen (Tylenol Tab*)  650 mg PO Q4H PRN


   PRN Reason: PAIN/FEVER


   Last Admin: 01/08/19 05:17 Dose:  650 mg


Albuterol (Ventolin Hfa Inhaler*)  2 puff INH Q4H PRN


   PRN Reason: SHORTNESS OF BREATH


Aspirin (Aspirin Ec Tab*)  81 mg PO DAILY [facility]


   Last Admin: 01/09/19 08:15 Dose:  81 mg


Atorvastatin Calcium (Lipitor*)  10 mg PO DAILY [facility]


   Last Admin: 01/09/19 08:16 Dose:  10 mg


Calcium Carbonate (Calcium Carbonate Tab*)  1,250 mg PO BID [facility]


   Last Admin: 01/09/19 08:15 Dose:  1,250 mg


Cholecalciferol (Vitamin D Tab*)  1,000 units PO DAILY [facility]


   Last Admin: 01/09/19 08:15 Dose:  1,000 units


Docusate Sodium (Colace Cap*)  100 mg PO BID [facility]


   Last Admin: 01/09/19 08:15 Dose:  100 mg


Fish Oil (Fish Oil (Nf))  1,000 mg PO DAILY [facility]; Protocol


   Last Admin: 01/09/19 08:15 Dose:  1,000 mg


Heparin Sodium (Porcine) (Heparin Vial(*))  5,000 units SUBCUT Q8HR [facility]


   Last Admin: 01/09/19 05:56 Dose:  5,000 units


Loperamide HCl (Imodium Liq*)  2 mg PO DAILY PRN


   PRN Reason: LOOSE STOOLS


Melatonin (Melatonin)  9 mg PO BEDTIME [facility]


   Last Admin: 01/08/19 20:49 Dose:  9 mg


Memantine (Namenda Tab*)  10 mg PO BID [facility]


   Last Admin: 01/09/19 08:15 Dose:  10 mg


Metoprolol Tartrate (Lopressor Tab*)  25 mg PO BID [facility]


   Last Admin: 01/09/19 08:21 Dose:  Not Given


Nystatin (Nystatin Top Powder*)  1 applic TOPICAL TID [facility]


   Last Admin: 01/09/19 08:21 Dose:  1 applic


Senna (Senokot Tab*)  2 tab PO BEDTIME [facility]


   Last Admin: 01/08/19 20:52 Dose:  2 tab


Tiotropium Bromide (Spiriva Cap.Inh*)  1 cap INH DAILY [facility]


   Last Admin: 01/09/19 07:52 Dose:  Not Given








 Vital Signs - 8 hr











  01/09/19 01/09/19 01/09/19





  04:12 07:30 08:00


 


Temperature 98.5 F 98.4 F 


 


Pulse Rate 93 43 


 


Respiratory 18 18 18





Rate   


 


Blood Pressure 134/74 119/61 





(mmHg)   


 


O2 Sat by Pulse 94 94 





Oximetry   














  01/09/19





  08:12


 


Temperature 


 


Pulse Rate 64


 


Respiratory 





Rate 


 


Blood Pressure 





(mmHg) 


 


O2 Sat by Pulse 





Oximetry 











Oxygen Devices in Use Now: None


Appearance: 86 yo M in nAD, oriented to self onlly


Eyes: No Scleral Icterus, PERRLA


Ears/Nose/Mouth/Throat: NL Teeth, Lips, Gums, Mucous Membranes Moist


Neck: NL Appearance and Movements; NL JVP, Trachea Midline


Respiratory: Symmetrical Chest Expansion and Respiratory Effort, - - faint 

bibasiliar crackles


Cardiovascular: NL Sounds; No Murmurs; No JVD


Abdominal: NL Sounds; No Tenderness; No Distention


Lymphatic: No Cervical Adenopathy


Extremities: No Clubbing, Cyanosis, - - R wrist edema improving, still refuses 

to flex wrist


Skin: No Nodules or Sclerosis


Neurological: NL Muscle Strength and Tone


Result Diagrams: 


 01/09/19 05:20





 01/09/19 05:20


Additional Lab and Data: 





  


Microbiology and Other Data: 


 Microbiology











 12/30/18 05:20 Nasal Screen MRSA (PCR) - Final





 Nasal    Mrsa Detected











EKG Data: 





ns no acute st t change 





Assess/Plan/Problems-Billing


Assessment: 





Mr. Denis is 85 yr old male with a history significant for mod dementia, 

prostate cancer, and HTN. Patient was in his normal state of health when he had 

a prolonged episode of syncope with bradycardia and hypotension with an episode 

of diarrhea. Patient was admitted for evaluation of syncope 








- Patient Problems


(1) Fever


Comment: temp 101.4 overnight 1/7/19. Resolved with tylenol , leukocytosis 

resolved


Urine and CXR wnl


Blood cultures neg


Right wrist is only localizing symptom . appreciate ID and ortho consults. 

Suspect pseudogout. will try VIRY NSAIDS


 CRP>200 concerning, but today improved to 150. 


   





(2) Bradycardia


Comment: 


- No further episodes of symptomatic bradycardia


- Donepezil discontinued and metoprolol decreased with resolution of 

bradycardia.


- No evidence of MI


- Echocardiogram shows EF 55-60% with mild AS and mild pulm htn.


   





(3) Elevated d-dimer


Comment: 


- Unknown cause, CTA chest negative for PE and LE doppler negative for DVT.    





(4) Lethargy


Comment: 


- improving


-likley due to prerenal state, got sedated on Risperdal, then prerenal  and 

hypernatremic. started on IVF on 1/8/19 AM now resolved.   





(5) Pulmonary edema


Comment: 


- On Ct scan at admission with signs of pulmonary hypertension


- Will not diurese  due to no obvious signs of fluid overload.    





(6) HTN (hypertension)


Comment: 


- Controlled


- Continue decreased metoprolol, enalapril


-d/c'd Norvasc for SBP in low 100's   





(7) Moderate dementia with behavioral disturbance


Comment: 


no continued aggression 


Last PRN haldol given due to agitation on 12/31 and 1/1


- started on Risperidone PO trialed but increased drowsiness and now 

discontinued.  


- [place] reports pt is alert to self only at baseline 


   





(8) DVT prophylaxis


Comment: 


- Heparin SubQ.    


Status and Disposition: 





. Case management is following. Pt may need GRACE prior to returning to [place]

## 2019-01-09 NOTE — PN
Progress Note





- Progress Note


Date of Service: 01/09/19


SOAP: 


Subjective:


[]Patient seen and examined at bedside. He is more verbal today. Denies wrist 

pain.





Objective:


[]General: NAD


RUE: Erythema of the ulnar aspect of the wrist is decreased from previous, 

there is no tenderness to palpation of the wrist. No obvious effusion. Able to 

flex, extend, ulnar and radial deviate without pain





Assessment:


[]Likely pseudogout, unlikely septic wrist. 





Plan:


[]CRP and WBC trending down. Afebrile.


Continued observation, antiinflammatories, no need for additional orthopedic 

intervention at this time. 

















 Laboratory Last Values











WBC  9.0 10^3/ul (3.5-10.8)   01/09/19  05:20    


 


RBC  3.38 10^6/ul (4.00-5.40)  L  01/09/19  05:20    


 


Hgb  10.2 g/dl (14.0-18.0)  L  01/09/19  05:20    


 


Hct  32 % (42-52)  L  01/09/19  05:20    


 


MCV  93 fL (80-94)   01/09/19  05:20    


 


MCH  30 pg (27-31)   01/09/19  05:20    


 


MCHC  32 g/dl (31-36)   01/09/19  05:20    


 


RDW  15 % (10.5-15)   01/09/19  05:20    


 


Plt Count  285 10^3/ul (150-450)   01/09/19  05:20    


 


MPV  9.3 fL (7.4-10.4)   01/09/19  05:20    


 


Neut % (Auto)  70.8 %  01/09/19  05:20    


 


Lymph % (Auto)  15.2 %  01/09/19  05:20    


 


Mono % (Auto)  9.3 %  01/09/19  05:20    


 


Eos % (Auto)  3.6 %  01/09/19  05:20    


 


Baso % (Auto)  1.1 %  01/09/19  05:20    


 


Absolute Neuts (auto)  6.4 10^3/ul (1.5-7.7)   01/09/19  05:20    


 


Absolute Lymphs (auto)  1.4 10^3/ul (1.0-4.8)   01/09/19  05:20    


 


Absolute Monos (auto)  0.8 10^3/ul (0-0.8)   01/09/19  05:20    


 


Absolute Eos (auto)  0.3 10^3/ul (0-0.6)   01/09/19  05:20    


 


Absolute Basos (auto)  0.1 10^3/ul (0-0.2)   01/09/19  05:20    


 


Absolute Nucleated RBC  0 10^3/ul  01/09/19  05:20    


 


Nucleated RBC %  0   01/09/19  05:20    


 


ESR  103 mm/Hr (0-40)  H  01/07/19  07:22    


 


INR (Anticoag Therapy)  0.98  (0.77-1.02)   12/29/18  19:41    


 


APTT  25.6 seconds (26.0-36.3)  L  12/29/18  19:41    


 


D-Dimer, Quantitative  945 ng/mL (Less Than 230)  H  12/30/18  05:18    


 


Sodium  144 mmol/L (135-145)   01/09/19  05:20    


 


Potassium  4.1 mmol/L (3.5-5.0)   01/09/19  05:20    


 


Chloride  113 mmol/L (101-111)  H  01/09/19  05:20    


 


Carbon Dioxide  26 mmol/L (22-32)   01/09/19  05:20    


 


Anion Gap  5 mmol/L (2-11)   01/09/19  05:20    


 


BUN  49 mg/dL (6-24)  H  01/09/19  05:20    


 


Creatinine  0.95 mg/dL (0.67-1.17)   01/09/19  05:20    


 


Est GFR ( Amer)  91.2  (>60)   01/09/19  05:20    


 


Est GFR (Non-Af Amer)  75.3  (>60)   01/09/19  05:20    


 


BUN/Creatinine Ratio  51.6  (8-20)  H  01/09/19  05:20    


 


Glucose  133 mg/dL ()  H  01/09/19  05:20    


 


POC Glucose (mg/dL)  141 mg/dL ()  H  12/29/18  18:55    


 


Lactic Acid  1.0 mmol/L (0.5-2.0)   12/29/18  19:40    


 


Uric Acid  7.1 mg/dL (4.4-7.6)   01/08/19  12:59    


 


Calcium  8.7 mg/dL (8.6-10.3)   01/09/19  05:20    


 


Magnesium  2.2 mg/dL (1.9-2.7)   01/02/19  09:58    


 


Total Bilirubin  0.40 mg/dL (0.2-1.0)   12/29/18  19:40    


 


AST  25 U/L (13-39)   12/29/18  19:40    


 


ALT  24 U/L (7-52)   12/29/18  19:40    


 


Alkaline Phosphatase  61 U/L ()   12/29/18  19:40    


 


Total Creatine Kinase  51 U/L ()   12/29/18  19:40    


 


CK-MB (CK-2)  3.0 ng/mL (0.6-6.3)   12/29/18  19:40    


 


Troponin I  0.01 ng/mL (<0.04)   12/30/18  05:13    


 


C-Reactive Protein  158.19 mg/L (<8.01)  H  01/09/19  05:20    


 


B-Natriuretic Peptide  112 pg/mL (<=100)  H  12/29/18  19:40    


 


Total Protein  6.4 g/dL (6.4-8.9)   12/29/18  19:40    


 


Albumin  3.1 g/dL (3.2-5.2)  L  12/29/18  19:40    


 


Globulin  3.3 g/dL (2-4)   12/29/18  19:40    


 


Albumin/Globulin Ratio  0.9  (1-3)  L  12/29/18  19:40    


 


Triglycerides  94 mg/dL  12/30/18  05:13    


 


Cholesterol  122 mg/dL  12/30/18  05:13    


 


LDL Cholesterol  56 mg/dL  12/30/18  05:13    


 


HDL Cholesterol  47.2 mg/dL  12/30/18  05:13    


 


TSH  0.92 mcIU/mL (0.34-5.60)   12/30/18  05:13    


 


Thyroxine (T4)  5.47 g/dL (6.09-12.23)  L  12/29/18  19:40    


 


Urine Color  Yellow   01/07/19  14:00    


 


Urine Appearance  Cloudy   01/07/19  14:00    


 


Urine pH  5.0  (5-9)   01/07/19  14:00    


 


Ur Specific Gravity  1.023  (1.010-1.030)   01/07/19  14:00    


 


Urine Protein  Negative  (Negative)   01/07/19  14:00    


 


Urine Ketones  Negative  (Negative)   01/07/19  14:00    


 


Urine Blood  Negative  (Negative)   01/07/19  14:00    


 


Urine Nitrate  Negative  (Negative)   01/07/19  14:00    


 


Urine Bilirubin  Negative  (Negative)   01/07/19  14:00    


 


Urine Urobilinogen  Negative  (Negative)   01/07/19  14:00    


 


Ur Leukocyte Esterase  Negative  (Negative)   01/07/19  14:00    


 


Urine Glucose  Negative  (Negative)   01/07/19  14:00    








 Vital Signs











Temp  97.8 F   01/09/19 11:10


 


Pulse  85   01/09/19 11:10


 


Resp  18   01/09/19 11:10


 


BP  134/72   01/09/19 11:10


 


Pulse Ox  95   01/09/19 11:10








 Intake & Output











 01/08/19 01/09/19 01/09/19





 18:59 06:59 18:59


 


Intake Total 1117 60 300


 


Balance 1117 60 300


 


Intake:   


 


  IV Fluids 1057  


 


    NS (0.9%) 1057  


 


  Oral 60 60 300


 


Other:   


 


  Estimated Void Medium Large 


 


  # Bowel Movements  0 


 


  Estimated Stool Amount  Small 


 


  # Voids 3 2

## 2019-01-10 RX ADMIN — CALCIUM SCH MG: 500 TABLET ORAL at 09:38

## 2019-01-10 RX ADMIN — MEMANTINE HYDROCHLORIDE SCH MG: 10 TABLET ORAL at 20:24

## 2019-01-10 RX ADMIN — NYSTATIN SCH: 100000 POWDER TOPICAL at 23:25

## 2019-01-10 RX ADMIN — TIOTROPIUM BROMIDE SCH: 18 CAPSULE ORAL; RESPIRATORY (INHALATION) at 07:31

## 2019-01-10 RX ADMIN — Medication SCH MG: at 09:38

## 2019-01-10 RX ADMIN — METOPROLOL TARTRATE SCH MG: 25 TABLET, FILM COATED ORAL at 20:25

## 2019-01-10 RX ADMIN — CALCIUM SCH MG: 500 TABLET ORAL at 20:20

## 2019-01-10 RX ADMIN — METOPROLOL TARTRATE SCH MG: 25 TABLET, FILM COATED ORAL at 09:38

## 2019-01-10 RX ADMIN — NAPROXEN SCH MG: 250 TABLET ORAL at 09:38

## 2019-01-10 RX ADMIN — NYSTATIN SCH APPLIC: 100000 POWDER TOPICAL at 14:58

## 2019-01-10 RX ADMIN — NYSTATIN SCH APPLIC: 100000 POWDER TOPICAL at 09:38

## 2019-01-10 RX ADMIN — DOCUSATE SODIUM SCH: 100 CAPSULE, LIQUID FILLED ORAL at 09:39

## 2019-01-10 RX ADMIN — Medication SCH UNITS: at 09:38

## 2019-01-10 RX ADMIN — SENNOSIDES SCH TAB: 8.6 TABLET, FILM COATED ORAL at 20:21

## 2019-01-10 RX ADMIN — ATORVASTATIN CALCIUM SCH MG: 10 TABLET, FILM COATED ORAL at 09:38

## 2019-01-10 RX ADMIN — DOCUSATE SODIUM SCH MG: 100 CAPSULE, LIQUID FILLED ORAL at 20:21

## 2019-01-10 RX ADMIN — HEPARIN SODIUM SCH: 5000 INJECTION INTRAVENOUS; SUBCUTANEOUS at 14:57

## 2019-01-10 RX ADMIN — HEPARIN SODIUM SCH UNITS: 5000 INJECTION INTRAVENOUS; SUBCUTANEOUS at 06:38

## 2019-01-10 RX ADMIN — ASPIRIN SCH MG: 81 TABLET, COATED ORAL at 09:38

## 2019-01-10 RX ADMIN — MEMANTINE HYDROCHLORIDE SCH MG: 10 TABLET ORAL at 09:38

## 2019-01-10 NOTE — PN
Subjective


Date of Service: 01/10/19


Interval History: 





pt is more verbal today, occasionally able to answer appropriately to 

questions. Denies pain. Still very poor historian


Family History: Unchanged from Admission


Social History: Unchanged from Admission


Past Medical History: Unchanged from Admission





Objective


Active Medications: 








Acetaminophen (Tylenol Tab*)  650 mg PO Q4H PRN


   PRN Reason: PAIN/FEVER


   Last Admin: 01/08/19 05:17 Dose:  650 mg


Albuterol (Ventolin Hfa Inhaler*)  2 puff INH Q4H PRN


   PRN Reason: SHORTNESS OF BREATH


Aspirin (Aspirin Ec Tab*)  81 mg PO DAILY [facility]


   Last Admin: 01/10/19 09:38 Dose:  81 mg


Atorvastatin Calcium (Lipitor*)  10 mg PO DAILY [facility]


   Last Admin: 01/10/19 09:38 Dose:  10 mg


Calcium Carbonate (Calcium Carbonate Tab*)  1,250 mg PO BID [facility]


   Last Admin: 01/10/19 09:38 Dose:  1,250 mg


Cholecalciferol (Vitamin D Tab*)  1,000 units PO DAILY [facility]


   Last Admin: 01/10/19 09:38 Dose:  1,000 units


Docusate Sodium (Colace Cap*)  100 mg PO BID [facility]


   Last Admin: 01/10/19 09:39 Dose:  Not Given


Fish Oil (Fish Oil (Nf))  1,000 mg PO DAILY [facility]; Protocol


   Last Admin: 01/10/19 09:38 Dose:  1,000 mg


Heparin Sodium (Porcine) (Heparin Vial(*))  5,000 units SUBCUT Q8HR [facility]


   Last Admin: 01/10/19 06:38 Dose:  5,000 units


Loperamide HCl (Imodium Liq*)  2 mg PO DAILY PRN


   PRN Reason: LOOSE STOOLS


Melatonin (Melatonin)  9 mg PO BEDTIME [facility]


   Last Admin: 01/09/19 23:13 Dose:  9 mg


Memantine (Namenda Tab*)  10 mg PO BID [facility]


   Last Admin: 01/10/19 09:38 Dose:  10 mg


Metoprolol Tartrate (Lopressor Tab*)  25 mg PO BID [facility]


   Last Admin: 01/10/19 09:38 Dose:  25 mg


Naproxen (Naprosyn Tab*)  250 mg PO Q12H [facility]


   Stop: 01/10/19 23:59


   Last Admin: 01/10/19 09:38 Dose:  250 mg


Nystatin (Nystatin Top Powder*)  1 applic TOPICAL TID [facility]


   Last Admin: 01/10/19 09:38 Dose:  1 applic


Senna (Senokot Tab*)  2 tab PO BEDTIME [facility]


   Last Admin: 01/09/19 23:08 Dose:  2 tab


Tiotropium Bromide (Spiriva Cap.Inh*)  1 cap INH DAILY [facility]


   Last Admin: 01/10/19 07:31 Dose:  Not Given








 Vital Signs - 8 hr











  01/10/19 01/10/19 01/10/19





  03:11 07:37 07:41


 


Temperature 98.9 F 97.7 F 


 


Pulse Rate 78 78 


 


Respiratory 18 16 18





Rate   


 


Blood Pressure 118/66 124/62 





(mmHg)   


 


O2 Sat by Pulse 93 95 





Oximetry   











Oxygen Devices in Use Now: None


Appearance: 84 yo m, oriented to self


Eyes: No Scleral Icterus, PERRLA


Ears/Nose/Mouth/Throat: NL Teeth, Lips, Gums, Mucous Membranes Moist


Neck: NL Appearance and Movements; NL JVP, Trachea Midline


Respiratory: Symmetrical Chest Expansion and Respiratory Effort, Clear to 

Auscultation


Cardiovascular: NL Sounds; No Murmurs; No JVD, RRR


Abdominal: NL Sounds; No Tenderness; No Distention


Lymphatic: No Cervical Adenopathy


Extremities: No Edema, No Clubbing, Cyanosis, - - R wrist edema improved, ROM 

improved


Skin: No Rash or Ulcers, No Nodules or Sclerosis


Neurological: NL Muscle Strength and Tone


Result Diagrams: 


 01/09/19 05:20





 01/09/19 05:20


Additional Lab and Data: 





  


Microbiology and Other Data: 


 Microbiology











 12/30/18 05:20 Nasal Screen MRSA (PCR) - Final





 Nasal    Mrsa Detected











EKG Data: 





ns no acute st t change 





Assess/Plan/Problems-Billing


Assessment: 





Mr. Denis is 85 yr old male with a history significant for mod dementia, 

prostate cancer, and HTN. Patient was in his normal state of health when he had 

a prolonged episode of syncope with bradycardia and hypotension with an episode 

of diarrhea. Patient was admitted for evaluation of syncope 








- Patient Problems


(1) Fever


Comment: temp 101.4 overnight 1/7/19. Resolved with tylenol , leukocytosis 

resolved


Urine and CXR wnl


Blood cultures neg


Right wrist is only localizing symptom . appreciate ID and ortho consults. 

Suspect pseudogout. Appears to improve with [facility] Naproxen, will cont one more day





   





(2) Bradycardia


Comment: 


- No further episodes of symptomatic bradycardia


- Donepezil discontinued and metoprolol decreased with resolution of 

bradycardia.


- No evidence of MI


- Echocardiogram shows EF 55-60% with mild AS and mild pulm htn.


   





(3) Elevated d-dimer


Comment: 


- Unknown cause, CTA chest negative for PE and LE doppler negative for DVT.    





(4) Lethargy


Comment: 


- improving


-likely due to prerenal state, got sedated on Risperdal, then prerenal  and 

hypernatremic. started on IVF on 1/8/19 AM now resolved.   





(5) Pulmonary edema


Comment: 


- On Ct scan at admission with signs of pulmonary hypertension


- Will not diurese  due to no obvious signs of fluid overload.    





(6) HTN (hypertension)


Comment: 


- Controlled


- Continue decreased metoprolol, enalapril


-d/c'd Norvasc for SBP in low 100's   





(7) Moderate dementia with behavioral disturbance


Comment: Noted at the  beginning of hospital stay. As per d/w daughter pt 's 

baseline mentation is pleasant and confused.I suspect pt developed delirium at 

admission probably related to pain , that improved dramatically aftyer R wrist 

mercedes was treated.





- started on Risperidone PO trialed but increased drowsiness and now 

discontinued.  





   





(8) DVT prophylaxis


Comment: 


- Heparin SubQ.    


Status and Disposition: 





. Case management is following. Pt may need GRACE prior to returning to [place]

## 2019-01-11 RX ADMIN — HEPARIN SODIUM SCH UNITS: 5000 INJECTION INTRAVENOUS; SUBCUTANEOUS at 13:47

## 2019-01-11 RX ADMIN — METOPROLOL TARTRATE SCH MG: 25 TABLET, FILM COATED ORAL at 23:02

## 2019-01-11 RX ADMIN — DOCUSATE SODIUM SCH MG: 100 CAPSULE, LIQUID FILLED ORAL at 09:54

## 2019-01-11 RX ADMIN — NYSTATIN SCH: 100000 POWDER TOPICAL at 10:34

## 2019-01-11 RX ADMIN — MEMANTINE HYDROCHLORIDE SCH MG: 10 TABLET ORAL at 09:53

## 2019-01-11 RX ADMIN — NYSTATIN SCH APPLIC: 100000 POWDER TOPICAL at 23:02

## 2019-01-11 RX ADMIN — DOCUSATE SODIUM SCH MG: 100 CAPSULE, LIQUID FILLED ORAL at 23:01

## 2019-01-11 RX ADMIN — HEPARIN SODIUM SCH: 5000 INJECTION INTRAVENOUS; SUBCUTANEOUS at 00:23

## 2019-01-11 RX ADMIN — Medication SCH UNITS: at 09:54

## 2019-01-11 RX ADMIN — TIOTROPIUM BROMIDE SCH: 18 CAPSULE ORAL; RESPIRATORY (INHALATION) at 07:44

## 2019-01-11 RX ADMIN — CALCIUM SCH MG: 500 TABLET ORAL at 09:54

## 2019-01-11 RX ADMIN — Medication SCH MG: at 09:54

## 2019-01-11 RX ADMIN — SENNOSIDES SCH TAB: 8.6 TABLET, FILM COATED ORAL at 23:02

## 2019-01-11 RX ADMIN — METOPROLOL TARTRATE SCH MG: 25 TABLET, FILM COATED ORAL at 09:54

## 2019-01-11 RX ADMIN — ACETAMINOPHEN PRN MG: 325 TABLET ORAL at 23:02

## 2019-01-11 RX ADMIN — MEMANTINE HYDROCHLORIDE SCH MG: 10 TABLET ORAL at 23:02

## 2019-01-11 RX ADMIN — NYSTATIN SCH: 100000 POWDER TOPICAL at 13:48

## 2019-01-11 RX ADMIN — CALCIUM SCH MG: 500 TABLET ORAL at 23:01

## 2019-01-11 RX ADMIN — ATORVASTATIN CALCIUM SCH MG: 10 TABLET, FILM COATED ORAL at 09:54

## 2019-01-11 RX ADMIN — NAPROXEN SCH: 250 TABLET ORAL at 00:24

## 2019-01-11 RX ADMIN — HEPARIN SODIUM SCH UNITS: 5000 INJECTION INTRAVENOUS; SUBCUTANEOUS at 23:03

## 2019-01-11 RX ADMIN — HEPARIN SODIUM SCH UNITS: 5000 INJECTION INTRAVENOUS; SUBCUTANEOUS at 06:41

## 2019-01-11 RX ADMIN — ASPIRIN SCH MG: 81 TABLET, COATED ORAL at 09:54

## 2019-01-11 NOTE — PN
Subjective


Date of Service: 01/11/19


Interval History: 





Pt is sitting up in a chair, smiling, talking(nit really appropriate answers 

though). no complaints


Family History: Unchanged from Admission


Social History: Unchanged from Admission


Past Medical History: Unchanged from Admission





Objective


Active Medications: 








Acetaminophen (Tylenol Tab*)  650 mg PO Q4H PRN


   PRN Reason: PAIN/FEVER


   Last Admin: 01/08/19 05:17 Dose:  650 mg


Albuterol (Ventolin Hfa Inhaler*)  2 puff INH Q4H PRN


   PRN Reason: SHORTNESS OF BREATH


Aspirin (Aspirin Ec Tab*)  81 mg PO DAILY [facility]


   Last Admin: 01/11/19 09:54 Dose:  81 mg


Atorvastatin Calcium (Lipitor*)  10 mg PO DAILY [facility]


   Last Admin: 01/11/19 09:54 Dose:  10 mg


Calcium Carbonate (Calcium Carbonate Tab*)  1,250 mg PO BID [facility]


   Last Admin: 01/11/19 09:54 Dose:  1,250 mg


Cholecalciferol (Vitamin D Tab*)  1,000 units PO DAILY [facility]


   Last Admin: 01/11/19 09:54 Dose:  1,000 units


Docusate Sodium (Colace Cap*)  100 mg PO BID [facility]


   Last Admin: 01/11/19 09:54 Dose:  100 mg


Fish Oil (Fish Oil (Nf))  1,000 mg PO DAILY [facility]; Protocol


   Last Admin: 01/11/19 09:54 Dose:  1,000 mg


Heparin Sodium (Porcine) (Heparin Vial(*))  5,000 units SUBCUT Q8HR [facility]


   Last Admin: 01/11/19 06:41 Dose:  5,000 units


Loperamide HCl (Imodium Liq*)  2 mg PO DAILY PRN


   PRN Reason: LOOSE STOOLS


Melatonin (Melatonin)  9 mg PO BEDTIME [facility]


   Last Admin: 01/10/19 20:24 Dose:  9 mg


Memantine (Namenda Tab*)  10 mg PO BID [facility]


   Last Admin: 01/11/19 09:53 Dose:  10 mg


Metoprolol Tartrate (Lopressor Tab*)  25 mg PO BID [facility]


   Last Admin: 01/11/19 09:54 Dose:  25 mg


Nystatin (Nystatin Top Powder*)  1 applic TOPICAL TID [facility]


   Last Admin: 01/11/19 10:34 Dose:  Not Given


Senna (Senokot Tab*)  2 tab PO BEDTIME [facility]


   Last Admin: 01/10/19 20:21 Dose:  2 tab


Tiotropium Bromide (Spiriva Cap.Inh*)  1 cap INH DAILY [facility]


   Last Admin: 01/11/19 07:44 Dose:  Not Given








 Vital Signs - 8 hr











  01/11/19





  08:15


 


Pulse Rate 87


 


Respiratory 16





Rate 


 


Blood Pressure 129/64





(mmHg) 


 


O2 Sat by Pulse 97





Oximetry 











Oxygen Devices in Use Now: None


Appearance: 84 yo M in NAD, oriented to self only


Eyes: No Scleral Icterus, PERRLA


Ears/Nose/Mouth/Throat: NL Teeth, Lips, Gums, Mucous Membranes Moist


Neck: NL Appearance and Movements; NL JVP, Trachea Midline


Respiratory: Symmetrical Chest Expansion and Respiratory Effort, Clear to 

Auscultation


Cardiovascular: NL Sounds; No Murmurs; No JVD, RRR


Abdominal: NL Sounds; No Tenderness; No Distention


Lymphatic: No Cervical Adenopathy


Extremities: No Edema, No Clubbing, Cyanosis


Skin: No Rash or Ulcers, No Nodules or Sclerosis


Neurological: NL Muscle Strength and Tone


Result Diagrams: 


 01/09/19 05:20





 01/09/19 05:20


Additional Lab and Data: 





  


Microbiology and Other Data: 


 Microbiology











 12/30/18 05:20 Nasal Screen MRSA (PCR) - Final





 Nasal    Mrsa Detected











EKG Data: 





ns no acute st t change 





Assess/Plan/Problems-Billing


Assessment: 





Mr. Denis is 85 yr old male with a history significant for mod dementia, 

prostate cancer, and HTN. Patient was in his normal state of health when he had 

a prolonged episode of syncope with bradycardia and hypotension with an episode 

of diarrhea. Patient was admitted for evaluation of syncope 








- Patient Problems


(1) Moderate dementia with behavioral disturbance


Comment: Noted at the  beginning of hospital stay. As per d/w daughter pt 's 

baseline mentation is pleasant and confused.I suspect pt developed delirium at 

admission probably related to pain , that improved dramatically after R wrist 

mercedes was treated.


- started on Risperidone PO trial but increased drowsiness and  discontinued.  





   





(2) Lethargy


Comment: 


- resolved


-likely due to prerenal state, got sedated on Risperdal, then prerenal  and 

hypernatremic. started on IVF on 1/8/19 AM now resolved.   





(3) Fever


Comment: Resolved


temp 101.4 overnight 1/7/19. Resolved with tylenol , leukocytosis resolved


Urine and CXR wnl


Blood cultures neg


Right wrist is only localizing symptom . appreciate ID and ortho consults. 

Suspect pseudogout. Improved with VIRY Naproxen-will d/c





   





(4) Bradycardia


Comment: 


- No further episodes of symptomatic bradycardia


- Donepezil discontinued and metoprolol decreased with resolution of 

bradycardia.


- No evidence of MI


- Echocardiogram shows EF 55-60% with mild AS and mild pulm htn.


   





(5) Elevated d-dimer


Comment: 


- Unknown cause, CTA chest negative for PE and LE doppler negative for DVT.    





(6) Pulmonary edema


Comment: 


- On Ct scan at admission with signs of pulmonary hypertension


- Will not diurese  due to no obvious signs of fluid overload.    





(7) HTN (hypertension)


Comment: 


- Controlled


- Continue decreased metoprolol


-d/c'd Norvasc for SBP in low 100's   





(8) DVT prophylaxis


Comment: 


- Heparin SubQ.    


Status and Disposition: 





. Case management is following. Pt may need GRACE prior to returning to [place]

## 2019-01-12 RX ADMIN — MEMANTINE HYDROCHLORIDE SCH MG: 10 TABLET ORAL at 21:20

## 2019-01-12 RX ADMIN — CALCIUM SCH MG: 500 TABLET ORAL at 21:20

## 2019-01-12 RX ADMIN — NYSTATIN SCH: 100000 POWDER TOPICAL at 14:41

## 2019-01-12 RX ADMIN — HEPARIN SODIUM SCH: 5000 INJECTION INTRAVENOUS; SUBCUTANEOUS at 14:41

## 2019-01-12 RX ADMIN — METOPROLOL TARTRATE SCH MG: 25 TABLET, FILM COATED ORAL at 08:59

## 2019-01-12 RX ADMIN — ACETAMINOPHEN PRN MG: 325 TABLET ORAL at 21:20

## 2019-01-12 RX ADMIN — ATORVASTATIN CALCIUM SCH MG: 10 TABLET, FILM COATED ORAL at 08:55

## 2019-01-12 RX ADMIN — Medication SCH MG: at 09:02

## 2019-01-12 RX ADMIN — HEPARIN SODIUM SCH UNITS: 5000 INJECTION INTRAVENOUS; SUBCUTANEOUS at 22:45

## 2019-01-12 RX ADMIN — Medication SCH UNITS: at 08:54

## 2019-01-12 RX ADMIN — CALCIUM SCH MG: 500 TABLET ORAL at 08:58

## 2019-01-12 RX ADMIN — HEPARIN SODIUM SCH UNITS: 5000 INJECTION INTRAVENOUS; SUBCUTANEOUS at 04:56

## 2019-01-12 RX ADMIN — NYSTATIN SCH: 100000 POWDER TOPICAL at 08:58

## 2019-01-12 RX ADMIN — ASPIRIN SCH MG: 81 TABLET, COATED ORAL at 08:59

## 2019-01-12 RX ADMIN — TIOTROPIUM BROMIDE SCH: 18 CAPSULE ORAL; RESPIRATORY (INHALATION) at 07:29

## 2019-01-12 RX ADMIN — SENNOSIDES SCH TAB: 8.6 TABLET, FILM COATED ORAL at 21:20

## 2019-01-12 RX ADMIN — METOPROLOL TARTRATE SCH MG: 25 TABLET, FILM COATED ORAL at 21:20

## 2019-01-12 RX ADMIN — DOCUSATE SODIUM SCH MG: 100 CAPSULE, LIQUID FILLED ORAL at 21:20

## 2019-01-12 RX ADMIN — MEMANTINE HYDROCHLORIDE SCH MG: 10 TABLET ORAL at 08:54

## 2019-01-12 RX ADMIN — NYSTATIN SCH APPLIC: 100000 POWDER TOPICAL at 22:00

## 2019-01-12 RX ADMIN — DOCUSATE SODIUM SCH MG: 100 CAPSULE, LIQUID FILLED ORAL at 08:53

## 2019-01-12 NOTE — PN
Subjective


Date of Service: 01/12/19


Interval History: 





Pt is at his baseline in the past 3 days. Able to carry on a simple conversation

, although sometimes his answers are not appropriate


Family History: Unchanged from Admission


Social History: Unchanged from Admission


Past Medical History: Unchanged from Admission





Objective


Active Medications: 








Acetaminophen (Tylenol Tab*)  650 mg PO Q4H PRN


   PRN Reason: PAIN/FEVER


   Last Admin: 01/11/19 23:02 Dose:  650 mg


Albuterol (Ventolin Hfa Inhaler*)  2 puff INH Q4H PRN


   PRN Reason: SHORTNESS OF BREATH


Aspirin (Aspirin Ec Tab*)  81 mg PO DAILY [facility]


   Last Admin: 01/12/19 08:59 Dose:  81 mg


Atorvastatin Calcium (Lipitor*)  10 mg PO DAILY [facility]


   Last Admin: 01/12/19 08:55 Dose:  10 mg


Calcium Carbonate (Calcium Carbonate Tab*)  1,250 mg PO BID [facility]


   Last Admin: 01/12/19 08:58 Dose:  1,250 mg


Cholecalciferol (Vitamin D Tab*)  1,000 units PO DAILY [facility]


   Last Admin: 01/12/19 08:54 Dose:  1,000 units


Docusate Sodium (Colace Cap*)  100 mg PO BID [facility]


   Last Admin: 01/12/19 08:53 Dose:  100 mg


Fish Oil (Fish Oil (Nf))  1,000 mg PO DAILY [facility]; Protocol


   Last Admin: 01/12/19 09:02 Dose:  1,000 mg


Heparin Sodium (Porcine) (Heparin Vial(*))  5,000 units SUBCUT Q8HR [facility]


   Last Admin: 01/12/19 04:56 Dose:  5,000 units


Loperamide HCl (Imodium Liq*)  2 mg PO DAILY [facility]N


   PRN Reason: LOOSE STOOLS


Melatonin (Melatonin)  9 mg PO BEDTIME [facility]


   Last Admin: 01/11/19 23:02 Dose:  9 mg


Memantine (Namenda Tab*)  10 mg PO BID [facility]


   Last Admin: 01/12/19 08:54 Dose:  10 mg


Metoprolol Tartrate (Lopressor Tab*)  25 mg PO BID [facility]


   Last Admin: 01/12/19 08:59 Dose:  25 mg


Nystatin (Nystatin Top Powder*)  1 applic TOPICAL TID [facility]


   Last Admin: 01/12/19 08:58 Dose:  Not Given


Senna (Senokot Tab*)  2 tab PO BEDTIME [facility]


   Last Admin: 01/11/19 23:02 Dose:  2 tab


Tiotropium Bromide (Spiriva Cap.Inh*)  1 cap INH DAILY [facility]


   Last Admin: 01/12/19 07:29 Dose:  Not Given








 Vital Signs - 8 hr











  01/12/19 01/12/19





  03:15 07:39


 


Temperature 97.9 F 98.6 F


 


Pulse Rate 91 69


 


Respiratory 20 18





Rate  


 


Blood Pressure 117/68 109/50





(mmHg)  


 


O2 Sat by Pulse 96 96





Oximetry  











Oxygen Devices in Use Now: None


Appearance: 86 yo M oriented to self, pleasant and cooperative, but unable to 

follow commands


Eyes: No Scleral Icterus, PERRLA


Ears/Nose/Mouth/Throat: NL Teeth, Lips, Gums, Mucous Membranes Moist


Neck: NL Appearance and Movements; NL JVP, Trachea Midline


Respiratory: Symmetrical Chest Expansion and Respiratory Effort


Cardiovascular: NL Sounds; No Murmurs; No JVD, RRR


Abdominal: NL Sounds; No Tenderness; No Distention


Lymphatic: No Cervical Adenopathy


Extremities: No Clubbing, Cyanosis


Skin: No Rash or Ulcers, No Nodules or Sclerosis


Neurological: NL Muscle Strength and Tone


Result Diagrams: 


 01/09/19 05:20





 01/09/19 05:20


Additional Lab and Data: 





  


Microbiology and Other Data: 


 Microbiology











 12/30/18 05:20 Nasal Screen MRSA (PCR) - Final





 Nasal    Mrsa Detected











EKG Data: 





ns no acute st t change 





Assess/Plan/Problems-Billing


Assessment: 





Mr. Denis is 85 yr old male with a history significant for mod dementia, 

prostate cancer, and HTN. Patient was in his normal state of health when he had 

a prolonged episode of syncope with bradycardia and hypotension with an episode 

of diarrhea. Patient was admitted for evaluation of syncope 








- Patient Problems


(1) Moderate dementia with behavioral disturbance


Comment: Noted at the  beginning of hospital stay. As per d/w daughter pt 's 

baseline mentation is pleasant and confused.I suspect pt developed delirium at 

admission probably related to pain , that improved dramatically after R wrist 

mercedes was treated.


- started on Risperidone PO trial but increased drowsiness and  discontinued.  





   





(2) Lethargy


Comment: 


- resolved


-likely due to prerenal state, got sedated on Risperdal, then prerenal  and 

hypernatremic. started on IVF on 1/8/19 AM now resolved.   





(3) Fever


Comment: Resolved


temp 101.4 overnight 1/7/19. Resolved with tylenol , leukocytosis resolved


Urine and CXR wnl


Blood cultures neg


Right wrist is only localizing symptom . appreciate ID and ortho consults. 

Suspect pseudogout. Improved with VIRY Naproxen x 2 days, now off NSAIDS





   





(4) Bradycardia


Comment: 


- No further episodes of symptomatic bradycardia


- Donepezil discontinued and metoprolol decreased with resolution of 

bradycardia.


- No evidence of MI


- Echocardiogram shows EF 55-60% with mild AS and mild pulm htn.


   





(5) Elevated d-dimer


Comment: 


- Unknown cause, CTA chest negative for PE and LE doppler negative for DVT.    





(6) Pulmonary edema


Comment: 


- On Ct scan at admission with signs of pulmonary hypertension


- Will not diurese  due to no obvious signs of fluid overload.    





(7) HTN (hypertension)


Comment: 


- Controlled


- Continue decreased metoprolol


-d/c'd Norvasc for SBP in low 100's   





(8) DVT prophylaxis


Comment: 


- Heparin SubQ.    


Status and Disposition: 





. Case management is following. Medically ready for d/c but awaiting STR 

disposition

## 2019-01-13 LAB
ANION GAP SERPL CALC-SCNC: 6 MMOL/L (ref 2–11)
BUN SERPL-MCNC: 39 MG/DL (ref 6–24)
BUN/CREAT SERPL: 33.1 (ref 8–20)
CALCIUM SERPL-MCNC: 9.2 MG/DL (ref 8.6–10.3)
CHLORIDE SERPL-SCNC: 111 MMOL/L (ref 101–111)
GLUCOSE SERPL-MCNC: 108 MG/DL (ref 70–100)
HCO3 SERPL-SCNC: 27 MMOL/L (ref 22–32)
POTASSIUM SERPL-SCNC: 4.2 MMOL/L (ref 3.5–5)
SODIUM SERPL-SCNC: 144 MMOL/L (ref 135–145)

## 2019-01-13 RX ADMIN — SENNOSIDES SCH TAB: 8.6 TABLET, FILM COATED ORAL at 20:43

## 2019-01-13 RX ADMIN — ASPIRIN SCH MG: 81 TABLET, COATED ORAL at 09:18

## 2019-01-13 RX ADMIN — Medication SCH MG: at 09:22

## 2019-01-13 RX ADMIN — HEPARIN SODIUM SCH UNITS: 5000 INJECTION INTRAVENOUS; SUBCUTANEOUS at 14:43

## 2019-01-13 RX ADMIN — DOCUSATE SODIUM SCH: 100 CAPSULE, LIQUID FILLED ORAL at 20:57

## 2019-01-13 RX ADMIN — NYSTATIN SCH: 100000 POWDER TOPICAL at 15:11

## 2019-01-13 RX ADMIN — MEMANTINE HYDROCHLORIDE SCH MG: 10 TABLET ORAL at 20:43

## 2019-01-13 RX ADMIN — CALCIUM SCH MG: 500 TABLET ORAL at 20:43

## 2019-01-13 RX ADMIN — MEMANTINE HYDROCHLORIDE SCH MG: 10 TABLET ORAL at 09:18

## 2019-01-13 RX ADMIN — HEPARIN SODIUM SCH UNITS: 5000 INJECTION INTRAVENOUS; SUBCUTANEOUS at 20:44

## 2019-01-13 RX ADMIN — TIOTROPIUM BROMIDE SCH CAP: 18 CAPSULE ORAL; RESPIRATORY (INHALATION) at 08:03

## 2019-01-13 RX ADMIN — DOCUSATE SODIUM SCH MG: 100 CAPSULE, LIQUID FILLED ORAL at 20:42

## 2019-01-13 RX ADMIN — NYSTATIN SCH: 100000 POWDER TOPICAL at 09:26

## 2019-01-13 RX ADMIN — METOPROLOL TARTRATE SCH MG: 25 TABLET, FILM COATED ORAL at 20:43

## 2019-01-13 RX ADMIN — SODIUM CHLORIDE SCH MLS/HR: 900 IRRIGANT IRRIGATION at 09:38

## 2019-01-13 RX ADMIN — METOPROLOL TARTRATE SCH MG: 25 TABLET, FILM COATED ORAL at 09:18

## 2019-01-13 RX ADMIN — HEPARIN SODIUM SCH UNITS: 5000 INJECTION INTRAVENOUS; SUBCUTANEOUS at 05:35

## 2019-01-13 RX ADMIN — NYSTATIN SCH APPLIC: 100000 POWDER TOPICAL at 21:06

## 2019-01-13 RX ADMIN — Medication SCH UNITS: at 09:18

## 2019-01-13 RX ADMIN — DOCUSATE SODIUM SCH MG: 100 CAPSULE, LIQUID FILLED ORAL at 09:18

## 2019-01-13 RX ADMIN — ATORVASTATIN CALCIUM SCH MG: 10 TABLET, FILM COATED ORAL at 09:18

## 2019-01-13 RX ADMIN — SODIUM CHLORIDE SCH MLS/HR: 900 IRRIGANT IRRIGATION at 23:35

## 2019-01-13 RX ADMIN — CALCIUM SCH MG: 500 TABLET ORAL at 09:18

## 2019-01-13 NOTE — PN
Subjective


Date of Service: 01/13/19


Interval History: 





Pt's manation is unchanged in the past 3 days.pleasant, not able to follow 

commands and only a few words/answers are appropriate. Pt has no complaints


Family History: Unchanged from Admission


Social History: Unchanged from Admission


Past Medical History: Unchanged from Admission





Objective


Active Medications: 








Acetaminophen (Tylenol Tab*)  650 mg PO Q4H PRN


   PRN Reason: PAIN/FEVER


   Last Admin: 01/12/19 21:20 Dose:  650 mg


Albuterol (Ventolin Hfa Inhaler*)  2 puff INH Q4H PRN


   PRN Reason: SHORTNESS OF BREATH


Aspirin (Aspirin Ec Tab*)  81 mg PO DAILY [facility]


   Last Admin: 01/13/19 09:18 Dose:  81 mg


Atorvastatin Calcium (Lipitor*)  10 mg PO DAILY [facility]


   Last Admin: 01/13/19 09:18 Dose:  10 mg


Calcium Carbonate (Calcium Carbonate Tab*)  1,250 mg PO BID [facility]


   Last Admin: 01/13/19 09:18 Dose:  1,250 mg


Cholecalciferol (Vitamin D Tab*)  1,000 units PO DAILY [facility]


   Last Admin: 01/13/19 09:18 Dose:  1,000 units


Docusate Sodium (Colace Cap*)  100 mg PO BID [facility]


   Last Admin: 01/13/19 09:18 Dose:  100 mg


Fish Oil (Fish Oil (Nf))  1,000 mg PO DAILY [facility]; Protocol


   Last Admin: 01/13/19 09:22 Dose:  1,000 mg


Heparin Sodium (Porcine) (Heparin Vial(*))  5,000 units SUBCUT Q8HR [facility]


   Last Admin: 01/13/19 05:35 Dose:  5,000 units


Sodium Chloride (Ns 0.9% 1000 Ml*)  1,000 mls @ 75 mls/hr IV PER RATE [facility]


   Last Admin: 01/13/19 09:38 Dose:  75 mls/hr


Loperamide HCl (Imodium Liq*)  2 mg PO DAILY [facility]


   PRN Reason: LOOSE STOOLS


Melatonin (Melatonin)  9 mg PO BEDTIME [facility]


   Last Admin: 01/12/19 21:20 Dose:  9 mg


Memantine (Namenda Tab*)  10 mg PO BID [facility]


   Last Admin: 01/13/19 09:18 Dose:  10 mg


Metoprolol Tartrate (Lopressor Tab*)  25 mg PO BID [facility]


   Last Admin: 01/13/19 09:18 Dose:  25 mg


Naproxen (Naprosyn Tab*)  250 mg PO Q12H PRN


   PRN Reason: PAIN


Nystatin (Nystatin Top Powder*)  1 applic TOPICAL TID [facility]


   Last Admin: 01/13/19 09:26 Dose:  Not Given


Senna (Senokot Tab*)  2 tab PO BEDTIME [facility]


   Last Admin: 01/12/19 21:20 Dose:  2 tab


Tiotropium Bromide (Spiriva Cap.Inh*)  1 cap INH DAILY [facility]


   Last Admin: 01/13/19 08:03 Dose:  1 cap








 Vital Signs - 8 hr











  01/13/19 01/13/19 01/13/19





  07:41 08:06 09:39


 


Temperature 97.4 F  


 


Pulse Rate 77 55 


 


Respiratory 22 16 18





Rate   


 


Blood Pressure 126/68  





(mmHg)   


 


O2 Sat by Pulse 96 96 





Oximetry   











Oxygen Devices in Use Now: None


Appearance: 84 yo M in NAD, AAOx1


Eyes: No Scleral Icterus, PERRLA


Ears/Nose/Mouth/Throat: NL Teeth, Lips, Gums, Mucous Membranes Moist


Neck: NL Appearance and Movements; NL JVP, Trachea Midline


Respiratory: Symmetrical Chest Expansion and Respiratory Effort, Clear to 

Auscultation


Cardiovascular: NL Sounds; No Murmurs; No JVD, RRR


Abdominal: NL Sounds; No Tenderness; No Distention


Lymphatic: No Cervical Adenopathy


Extremities: No Edema, No Clubbing, Cyanosis


Skin: No Rash or Ulcers, No Nodules or Sclerosis


Neurological: NL Muscle Strength and Tone


Result Diagrams: 


 01/09/19 05:20





 01/13/19 05:46


Additional Lab and Data: 





  


Microbiology and Other Data: 


 Microbiology











 12/30/18 05:20 Nasal Screen MRSA (PCR) - Final





 Nasal    Mrsa Detected











EKG Data: 





ns no acute st t change 





Assess/Plan/Problems-Billing


Assessment: 





Mr. Denis is 85 yr old male with a history significant for mod dementia, 

prostate cancer, and HTN. Patient was in his normal state of health when he had 

a prolonged episode of syncope with bradycardia and hypotension with an episode 

of diarrhea. Patient was admitted for evaluation of syncope 








- Patient Problems


(1) Creatinine elevation


Comment: pt is unable to feel himself and I suspect he is a little 

dehydrated.Will start gentle IVF   





(2) Moderate dementia with behavioral disturbance


Comment: Noted at the  beginning of hospital stay. As per d/w daughter pt 's 

baseline mentation is pleasant and confused.I suspect pt developed delirium at 

admission probably related to pain , that improved dramatically after R wrist 

mercedes was treated.


- started on Risperidone PO trial but increased drowsiness and  discontinued.  





   





(3) Lethargy


Comment: 


- resolved


-likely due to prerenal state, got sedated on Risperdal, then prerenal  and 

hypernatremic. started on IVF on 1/8/19 AM now resolved.   





(4) Fever


Comment: Resolved


temp 101.4 overnight 1/7/19. Resolved with tylenol , leukocytosis resolved


Urine and CXR wnl


Blood cultures neg


Right wrist is only localizing symptom . appreciate ID and ortho consults. 

Suspect pseudogout. Improved with VIRY Naproxen x 2 days, now on Naproxen prn





   





(5) Bradycardia


Comment: 


- No further episodes of symptomatic bradycardia


- Donepezil discontinued and metoprolol decreased with resolution of 

bradycardia.


- No evidence of MI


- Echocardiogram shows EF 55-60% with mild AS and mild pulm htn.


   





(6) Elevated d-dimer


Comment: 


- Unknown cause, CTA chest negative for PE and LE doppler negative for DVT.    





(7) Pulmonary edema


Comment: 


- On Ct scan at admission with signs of pulmonary hypertension


- Will not diurese  due to no obvious signs of fluid overload.    





(8) HTN (hypertension)


Comment: 


- Controlled


- Continue decreased metoprolol


-d/c'd Norvasc for SBP in low 100's   





(9) DVT prophylaxis


Comment: 


- Heparin SubQ.    


Status and Disposition: 





. Case management is following. Medically ready for d/c but awaiting STR 

disposition

## 2019-01-14 LAB
ANION GAP SERPL CALC-SCNC: 6 MMOL/L (ref 2–11)
BUN SERPL-MCNC: 30 MG/DL (ref 6–24)
BUN/CREAT SERPL: 28 (ref 8–20)
CALCIUM SERPL-MCNC: 9.1 MG/DL (ref 8.6–10.3)
CHLORIDE SERPL-SCNC: 112 MMOL/L (ref 101–111)
GLUCOSE SERPL-MCNC: 152 MG/DL (ref 70–100)
HCO3 SERPL-SCNC: 25 MMOL/L (ref 22–32)
POTASSIUM SERPL-SCNC: 4 MMOL/L (ref 3.5–5)
SODIUM SERPL-SCNC: 143 MMOL/L (ref 135–145)

## 2019-01-14 RX ADMIN — SODIUM CHLORIDE SCH MLS/HR: 900 IRRIGANT IRRIGATION at 13:23

## 2019-01-14 RX ADMIN — MEMANTINE HYDROCHLORIDE SCH MG: 10 TABLET ORAL at 08:38

## 2019-01-14 RX ADMIN — CALCIUM SCH MG: 500 TABLET ORAL at 08:39

## 2019-01-14 RX ADMIN — NYSTATIN SCH APPLIC: 100000 POWDER TOPICAL at 22:01

## 2019-01-14 RX ADMIN — Medication SCH UNITS: at 08:39

## 2019-01-14 RX ADMIN — METOPROLOL TARTRATE SCH MG: 25 TABLET, FILM COATED ORAL at 22:05

## 2019-01-14 RX ADMIN — TIOTROPIUM BROMIDE SCH: 18 CAPSULE ORAL; RESPIRATORY (INHALATION) at 08:14

## 2019-01-14 RX ADMIN — HEPARIN SODIUM SCH UNITS: 5000 INJECTION INTRAVENOUS; SUBCUTANEOUS at 05:29

## 2019-01-14 RX ADMIN — MEMANTINE HYDROCHLORIDE SCH MG: 10 TABLET ORAL at 22:04

## 2019-01-14 RX ADMIN — ATORVASTATIN CALCIUM SCH MG: 10 TABLET, FILM COATED ORAL at 08:39

## 2019-01-14 RX ADMIN — Medication SCH: at 08:37

## 2019-01-14 RX ADMIN — ASPIRIN SCH MG: 81 TABLET, COATED ORAL at 08:39

## 2019-01-14 RX ADMIN — METOPROLOL TARTRATE SCH MG: 25 TABLET, FILM COATED ORAL at 08:39

## 2019-01-14 RX ADMIN — DOCUSATE SODIUM SCH: 100 CAPSULE, LIQUID FILLED ORAL at 08:36

## 2019-01-14 RX ADMIN — SENNOSIDES SCH TAB: 8.6 TABLET, FILM COATED ORAL at 22:03

## 2019-01-14 RX ADMIN — HEPARIN SODIUM SCH UNITS: 5000 INJECTION INTRAVENOUS; SUBCUTANEOUS at 13:24

## 2019-01-14 RX ADMIN — DOCUSATE SODIUM SCH MG: 100 CAPSULE, LIQUID FILLED ORAL at 22:03

## 2019-01-14 RX ADMIN — NYSTATIN SCH APPLIC: 100000 POWDER TOPICAL at 08:46

## 2019-01-14 RX ADMIN — CALCIUM SCH MG: 500 TABLET ORAL at 22:01

## 2019-01-14 RX ADMIN — HEPARIN SODIUM SCH: 5000 INJECTION INTRAVENOUS; SUBCUTANEOUS at 22:10

## 2019-01-14 RX ADMIN — NYSTATIN SCH: 100000 POWDER TOPICAL at 13:33

## 2019-01-14 NOTE — PN
Subjective


Date of Service: 01/14/19


Interval History: 


Pt is at his baseline.Denies pain.Very disoriented, unable to follow most of 

commands.


Seen with daughter in the room





Family History: Unchanged from Admission


Social History: Unchanged from Admission


Past Medical History: Unchanged from Admission





Objective


Active Medications: 








Acetaminophen (Tylenol Tab*)  650 mg PO Q4H PRN


   PRN Reason: PAIN/FEVER


   Last Admin: 01/12/19 21:20 Dose:  650 mg


Albuterol (Ventolin Hfa Inhaler*)  2 puff INH Q4H PRN


   PRN Reason: SHORTNESS OF BREATH


Aspirin (Aspirin Ec Tab*)  81 mg PO DAILY [facility]


   Last Admin: 01/14/19 08:39 Dose:  81 mg


Atorvastatin Calcium (Lipitor*)  10 mg PO DAILY [facility]


   Last Admin: 01/14/19 08:39 Dose:  10 mg


Calcium Carbonate (Calcium Carbonate Tab*)  1,250 mg PO BID [facility]


   Last Admin: 01/14/19 08:39 Dose:  1,250 mg


Cholecalciferol (Vitamin D Tab*)  1,000 units PO DAILY [facility]


   Last Admin: 01/14/19 08:39 Dose:  1,000 units


Docusate Sodium (Colace Cap*)  100 mg PO BID [facility]


   Last Admin: 01/14/19 08:36 Dose:  Not Given


Fish Oil (Fish Oil (Nf))  1,000 mg PO DAILY [facility]; Protocol


   Last Admin: 01/14/19 08:37 Dose:  Not Given


Heparin Sodium (Porcine) (Heparin Vial(*))  5,000 units SUBCUT Q8HR [facility]


   Last Admin: 01/14/19 13:24 Dose:  5,000 units


Loperamide HCl (Imodium Liq*)  2 mg PO DAILY [facility]


   PRN Reason: LOOSE STOOLS


Melatonin (Melatonin)  9 mg PO BEDTIME [facility]


   Last Admin: 01/13/19 20:43 Dose:  9 mg


Memantine (Namenda Tab*)  10 mg PO BID [facility]


   Last Admin: 01/14/19 08:38 Dose:  10 mg


Metoprolol Tartrate (Lopressor Tab*)  25 mg PO BID [facility]


   Last Admin: 01/14/19 08:39 Dose:  25 mg


Naproxen (Naprosyn Tab*)  250 mg PO Q12H PRN


   PRN Reason: PAIN


Nystatin (Nystatin Top Powder*)  1 applic TOPICAL TID [facility]


   Last Admin: 01/14/19 13:33 Dose:  Not Given


Senna (Senokot Tab*)  2 tab PO BEDTIME [facility]


   Last Admin: 01/13/19 20:43 Dose:  2 tab


Tiotropium Bromide (Spiriva Cap.Inh*)  1 cap INH DAILY [facility]


   Last Admin: 01/14/19 08:14 Dose:  Not Given








 Vital Signs - 8 hr











  01/14/19 01/14/19 01/14/19





  07:36 08:50 11:19


 


Temperature 97.5 F  98.1 F


 


Pulse Rate 80  80


 


Respiratory 16 16 16





Rate   


 


Blood Pressure 124/64  112/71





(mmHg)   


 


O2 Sat by Pulse 95  96





Oximetry   











Oxygen Devices in Use Now: None


Appearance: 86 yo M in nAD, , oriented to self only, speaking mostly word salad

, but able to read a newspaper today.


Eyes: No Scleral Icterus, PERRLA


Ears/Nose/Mouth/Throat: NL Teeth, Lips, Gums, Mucous Membranes Moist


Neck: NL Appearance and Movements; NL JVP, Trachea Midline


Respiratory: Symmetrical Chest Expansion and Respiratory Effort, Clear to 

Auscultation


Cardiovascular: NL Sounds; No Murmurs; No JVD, No Edema


Abdominal: NL Sounds; No Tenderness; No Distention


Lymphatic: No Cervical Adenopathy


Extremities: No Clubbing, Cyanosis


Skin: No Rash or Ulcers, No Nodules or Sclerosis


Neurological: NL Muscle Strength and Tone


Result Diagrams: 


 01/09/19 05:20





 01/14/19 09:20


Additional Lab and Data: 





  


Microbiology and Other Data: 


 Microbiology











 12/30/18 05:20 Nasal Screen MRSA (PCR) - Final





 Nasal    Mrsa Detected











EKG Data: 





ns no acute st t change 





Assess/Plan/Problems-Billing


Assessment: 





Mr. Denis is 85 yr old male with a history significant for mod dementia, 

prostate cancer, and HTN. Patient was in his normal state of health when he had 

a prolonged episode of syncope with bradycardia and hypotension with an episode 

of diarrhea. Patient was admitted for evaluation of syncope 








- Patient Problems


(1) Creatinine elevation


Comment: pt is unable to feel himself daughter thinks that he would not want to 

be fed. Will ask palliative care consult if we can withdraw pt's feeding at 

daughter's request   





(2) Moderate dementia with behavioral disturbance


Comment: Noted at the  beginning of hospital stay. As per d/w daughter pt 's 

baseline mentation is pleasant and confused.I suspect pt developed delirium at 

admission probably related to pain , that improved dramatically after R wrist 

mercedes was treated.


- started on Risperidone PO trial but increased drowsiness and  discontinued.  





   





(3) Lethargy


Comment: 


- resolved


-likely due to prerenal state, got sedated on Risperdal, then prerenal  and 

hypernatremic. started on IVF on 1/8/19 AM now resolved.   





(4) Fever


Comment: Resolved


temp 101.4 overnight 1/7/19. Resolved with tylenol , leukocytosis resolved


Urine and CXR wnl


Blood cultures neg


Right wrist is only localizing symptom . appreciate ID and ortho consults. 

Suspect pseudogout. Improved with VIRY Naproxen x 2 days, now on Naproxen prn





   





(5) Bradycardia


Comment: 


- No further episodes of symptomatic bradycardia


- Donepezil discontinued and metoprolol decreased with resolution of 

bradycardia.


- No evidence of MI


- Echocardiogram shows EF 55-60% with mild AS and mild pulm htn.


   





(6) Elevated d-dimer


Comment: 


- Unknown cause, CTA chest negative for PE and LE doppler negative for DVT.    





(7) Pulmonary edema


Comment: 


- On Ct scan at admission with signs of pulmonary hypertension


- Will not diurese  due to no obvious signs of fluid overload.    





(8) HTN (hypertension)


Comment: 


- Controlled


- Continue decreased metoprolol


-d/c'd Norvasc for SBP in low 100's   





(9) DVT prophylaxis


Comment: 


- Heparin SubQ.    


Status and Disposition: 





. Case management is following. Medically ready for d/c but awaiting STR 

disposition

## 2019-01-15 RX ADMIN — ATORVASTATIN CALCIUM SCH MG: 10 TABLET, FILM COATED ORAL at 08:54

## 2019-01-15 RX ADMIN — DOCUSATE SODIUM SCH: 100 CAPSULE, LIQUID FILLED ORAL at 20:29

## 2019-01-15 RX ADMIN — METOPROLOL TARTRATE SCH MG: 25 TABLET, FILM COATED ORAL at 08:53

## 2019-01-15 RX ADMIN — MEMANTINE HYDROCHLORIDE SCH MG: 10 TABLET ORAL at 20:34

## 2019-01-15 RX ADMIN — ASPIRIN SCH MG: 81 TABLET, COATED ORAL at 08:54

## 2019-01-15 RX ADMIN — Medication SCH: at 08:54

## 2019-01-15 RX ADMIN — CALCIUM SCH MG: 500 TABLET ORAL at 08:54

## 2019-01-15 RX ADMIN — NYSTATIN SCH: 100000 POWDER TOPICAL at 14:22

## 2019-01-15 RX ADMIN — DOCUSATE SODIUM SCH: 100 CAPSULE, LIQUID FILLED ORAL at 08:54

## 2019-01-15 RX ADMIN — SENNOSIDES SCH TAB: 8.6 TABLET, FILM COATED ORAL at 20:39

## 2019-01-15 RX ADMIN — TIOTROPIUM BROMIDE SCH CAP: 18 CAPSULE ORAL; RESPIRATORY (INHALATION) at 08:00

## 2019-01-15 RX ADMIN — NYSTATIN SCH APPLIC: 100000 POWDER TOPICAL at 20:29

## 2019-01-15 RX ADMIN — HEPARIN SODIUM SCH UNITS: 5000 INJECTION INTRAVENOUS; SUBCUTANEOUS at 05:46

## 2019-01-15 RX ADMIN — HEPARIN SODIUM SCH UNITS: 5000 INJECTION INTRAVENOUS; SUBCUTANEOUS at 20:41

## 2019-01-15 RX ADMIN — HEPARIN SODIUM SCH: 5000 INJECTION INTRAVENOUS; SUBCUTANEOUS at 14:22

## 2019-01-15 RX ADMIN — CALCIUM SCH MG: 500 TABLET ORAL at 20:34

## 2019-01-15 RX ADMIN — MEMANTINE HYDROCHLORIDE SCH MG: 10 TABLET ORAL at 08:54

## 2019-01-15 RX ADMIN — METOPROLOL TARTRATE SCH MG: 25 TABLET, FILM COATED ORAL at 20:34

## 2019-01-15 RX ADMIN — NYSTATIN SCH APPLIC: 100000 POWDER TOPICAL at 08:54

## 2019-01-15 RX ADMIN — Medication SCH UNITS: at 08:54

## 2019-01-15 NOTE — PN
Progress Note





- Progress Note


Date of Service: 01/14/19


SOAP: 


Subjective:


[]Patient seen and examined OOB in chair, have been following him loosely while 

in house. He has no pain of his right wrist. 





Objective:


[]General: NAD


RUE: Right wrist is without erythema or edema. He is using the right hand 

normally without any hesitation. Nontender to palpation of the wrist. Flexion, 

extension, ulnar deviation and radial deviation intact without pain. 





Assessment:


[]pseudogout





Plan:


[]No concern for septic wrist at this time. Please alert orthopedics with any 

concerns.

## 2019-01-15 NOTE — PN
Subjective


Date of Service: 01/15/19


Interval History: 





Pt's mental status is unchanged, still pleasant and not able to follow commands


Family History: Unchanged from Admission


Social History: Unchanged from Admission


Past Medical History: Unchanged from Admission





Objective


Active Medications: 








Acetaminophen (Tylenol Tab*)  650 mg PO Q4H PRN


   PRN Reason: PAIN/FEVER


   Last Admin: 01/12/19 21:20 Dose:  650 mg


Albuterol (Ventolin Hfa Inhaler*)  2 puff INH Q4H PRN


   PRN Reason: SHORTNESS OF BREATH


Aspirin (Aspirin Ec Tab*)  81 mg PO DAILY [facility]


   Last Admin: 01/15/19 08:54 Dose:  81 mg


Atorvastatin Calcium (Lipitor*)  10 mg PO DAILY [facility]


   Last Admin: 01/15/19 08:54 Dose:  10 mg


Calcium Carbonate (Calcium Carbonate Tab*)  1,250 mg PO BID [facility]


   Last Admin: 01/15/19 08:54 Dose:  1,250 mg


Cholecalciferol (Vitamin D Tab*)  1,000 units PO DAILY [facility]


   Last Admin: 01/15/19 08:54 Dose:  1,000 units


Docusate Sodium (Colace Cap*)  100 mg PO BID [facility]


   Last Admin: 01/15/19 08:54 Dose:  Not Given


Heparin Sodium (Porcine) (Heparin Vial(*))  5,000 units SUBCUT Q8HR [facility]


   Last Admin: 01/15/19 05:46 Dose:  5,000 units


Loperamide HCl (Imodium Liq*)  2 mg PO DAILY PRN


   PRN Reason: LOOSE STOOLS


Melatonin (Melatonin)  9 mg PO BEDTIME [facility]


   Last Admin: 01/14/19 22:05 Dose:  9 mg


Memantine (Namenda Tab*)  10 mg PO BID [facility]


   Last Admin: 01/15/19 08:54 Dose:  10 mg


Metoprolol Tartrate (Lopressor Tab*)  25 mg PO BID [facility]


   Last Admin: 01/15/19 08:53 Dose:  25 mg


Naproxen (Naprosyn Tab*)  250 mg PO Q12H PRN


   PRN Reason: PAIN


Nystatin (Nystatin Top Powder*)  1 applic TOPICAL TID [facility]


   Last Admin: 01/15/19 08:54 Dose:  1 applic


Senna (Senokot Tab*)  2 tab PO BEDTIME [facility]


   Last Admin: 01/14/19 22:03 Dose:  2 tab


Tiotropium Bromide (Spiriva Cap.Inh*)  1 cap INH DAILY [facility]


   Last Admin: 01/15/19 08:00 Dose:  1 cap








 Vital Signs - 8 hr











  01/15/19 01/15/19





  07:39 09:14


 


Temperature 97.5 F 


 


Pulse Rate 82 


 


Respiratory 16 16





Rate  


 


Blood Pressure 124/70 





(mmHg)  


 


O2 Sat by Pulse 94 





Oximetry  











Oxygen Devices in Use Now: None


Appearance: 84 yo M in nAD, oriented to self only


Eyes: No Scleral Icterus, PERRLA


Ears/Nose/Mouth/Throat: NL Teeth, Lips, Gums, Mucous Membranes Moist


Neck: NL Appearance and Movements; NL JVP, Trachea Midline


Respiratory: Symmetrical Chest Expansion and Respiratory Effort, Clear to 

Auscultation


Cardiovascular: NL Sounds; No Murmurs; No JVD


Abdominal: NL Sounds; No Tenderness; No Distention


Lymphatic: No Cervical Adenopathy


Extremities: No Edema


Skin: No Rash or Ulcers, No Nodules or Sclerosis


Neurological: NL Muscle Strength and Tone


Result Diagrams: 


 01/09/19 05:20





 01/14/19 09:20


Additional Lab and Data: 





  


Microbiology and Other Data: 


 Microbiology











 12/30/18 05:20 Nasal Screen MRSA (PCR) - Final





 Nasal    Mrsa Detected











EKG Data: 





ns no acute st t change 





Assess/Plan/Problems-Billing


Assessment: 





Mr. Denis is 85 yr old male with a history significant for mod dementia, 

prostate cancer, and HTN. Patient was in his normal state of health when he had 

a prolonged episode of syncope with bradycardia and hypotension with an episode 

of diarrhea. Patient was admitted for evaluation of syncope 








- Patient Problems


(1) Creatinine elevation


Comment: pt is unable to feel himself daughter thinks that he would not want to 

be fed. awaiting palliative care consult if we can withdraw pt's feeding at 

daughter's request   





(2) Moderate dementia with behavioral disturbance


Comment: Noted at the  beginning of hospital stay. As per d/w daughter pt 's 

baseline mentation is pleasant and confused.I suspect pt developed delirium at 

admission probably related to pain , that improved dramatically after R wrist 

mercedes was treated.


- started on Risperidone PO trial but increased drowsiness and  discontinued.  





   





(3) Lethargy


Comment: 


- resolved


-likely due to prerenal state, got sedated on Risperdal, then prerenal  and 

hypernatremic. started on IVF on 1/8/19 AM now resolved.   





(4) Fever


Comment: Resolved


temp 101.4 overnight 1/7/19. Resolved with tylenol , leukocytosis resolved


Urine and CXR wnl


Blood cultures neg


Right wrist is only localizing symptom . appreciate ID and ortho consults. 

Suspect pseudogout. Improved with VIRY Naproxen x 2 days, now on Naproxen prn





   





(5) Bradycardia


Comment: 


- No further episodes of symptomatic bradycardia


- Donepezil discontinued and metoprolol decreased with resolution of 

bradycardia.


- No evidence of MI


- Echocardiogram shows EF 55-60% with mild AS and mild pulm htn.


   





(6) Elevated d-dimer


Comment: 


- Unknown cause, CTA chest negative for PE and LE doppler negative for DVT.    





(7) Pulmonary edema


Comment: 


- On Ct scan at admission with signs of pulmonary hypertension


- Will not diurese  due to no obvious signs of fluid overload.    





(8) HTN (hypertension)


Comment: 


- Controlled


- Continue decreased metoprolol


-d/c'd Norvasc for SBP in low 100's   





(9) DVT prophylaxis


Comment: 


- Heparin SubQ.    


Status and Disposition: 





. Case management is following. Medically ready for d/c but awaiting STR 

disposition

## 2019-01-15 NOTE — CONSULT
Palliative / Hospice Consult


Ordering Provider: Jillian Calderón - PCP-Lisseth





- Subjective


Code Status: DNR


Advance Directives Location: In Chart


MOLST Part A Completed: Yes - completed with HCP


MOLST Part E Completed:: Yes - completed with HCP





- History or Present Illness


History or Present Illness: 





84 yo male resident of [facility] had an episode of 

syncope and was brought to the ER where he had low bp and increase heart rate. 

His PMH is significant for advanced dementia, valve replacement in 2014, HTN, 

pulmonary HTN, PVD, hyperlipidemia, IBS, anemia, h/o rheumatic fever, CAD with 

stents in LAD and circumflex, prostate cancer. Since at hospital he has had an 

echo which showed EF 55-60%, CXR showed pulmonary edema, ekg showed prolonged 

MO interval, venous doppler was negative, CT brain showed chronic microvascular 

densities and CT chest showed L elevated hemidiaphragm, pulmonary HTN and 

pulmonary edema. Pt is oriented to self only all history is from step daughter 

and medical records. He has had several episodes of combative behavior when he 

first arrived. He is incontinent of bladder and bowel.  He was just starting to 

use a walker before he was hospitalized he can use it here with help but not on 

his own. He is not self feeding or dressing. He does talk but not in a 

meaningful way. KPS 30% and PPS 30% FAST score 7c. Most recently he was in the 

ER with syncope on 12/2/18 and discharged. He had been undergoing an outpatient 

work up with his PCP. Step daughter says he has made his wishes clear and doesn'

t want to be intubated or have CPR. 


Lab Values: 


 Laboratory Last Values











WBC  9.0 10^3/ul (3.5-10.8)   01/09/19  05:20    


 


RBC  3.38 10^6/ul (4.00-5.40)  L  01/09/19  05:20    


 


Hgb  10.2 g/dl (14.0-18.0)  L  01/09/19  05:20    


 


Hct  32 % (42-52)  L  01/09/19  05:20    


 


MCV  93 fL (80-94)   01/09/19  05:20    


 


MCH  30 pg (27-31)   01/09/19  05:20    


 


MCHC  32 g/dl (31-36)   01/09/19  05:20    


 


RDW  15 % (10.5-15)   01/09/19  05:20    


 


Plt Count  285 10^3/ul (150-450)   01/09/19  05:20    


 


MPV  9.3 fL (7.4-10.4)   01/09/19  05:20    


 


Neut % (Auto)  70.8 %  01/09/19  05:20    


 


Lymph % (Auto)  15.2 %  01/09/19  05:20    


 


Mono % (Auto)  9.3 %  01/09/19  05:20    


 


Eos % (Auto)  3.6 %  01/09/19  05:20    


 


Baso % (Auto)  1.1 %  01/09/19  05:20    


 


Absolute Neuts (auto)  6.4 10^3/ul (1.5-7.7)   01/09/19  05:20    


 


Absolute Lymphs (auto)  1.4 10^3/ul (1.0-4.8)   01/09/19  05:20    


 


Absolute Monos (auto)  0.8 10^3/ul (0-0.8)   01/09/19  05:20    


 


Absolute Eos (auto)  0.3 10^3/ul (0-0.6)   01/09/19  05:20    


 


Absolute Basos (auto)  0.1 10^3/ul (0-0.2)   01/09/19  05:20    


 


Absolute Nucleated RBC  0 10^3/ul  01/09/19  05:20    


 


Nucleated RBC %  0   01/09/19  05:20    


 


ESR  103 mm/Hr (0-40)  H  01/07/19  07:22    


 


INR (Anticoag Therapy)  0.98  (0.77-1.02)   12/29/18  19:41    


 


APTT  25.6 seconds (26.0-36.3)  L  12/29/18  19:41    


 


D-Dimer, Quantitative  945 ng/mL (Less Than 230)  H  12/30/18  05:18    


 


Sodium  143 mmol/L (135-145)   01/14/19  09:20    


 


Potassium  4.0 mmol/L (3.5-5.0)   01/14/19  09:20    


 


Chloride  112 mmol/L (101-111)  H  01/14/19  09:20    


 


Carbon Dioxide  25 mmol/L (22-32)   01/14/19  09:20    


 


Anion Gap  6 mmol/L (2-11)   01/14/19  09:20    


 


BUN  30 mg/dL (6-24)  H  01/14/19  09:20    


 


Creatinine  1.07 mg/dL (0.67-1.17)   01/14/19  09:20    


 


Est GFR ( Amer)  79.5  (>60)   01/14/19  09:20    


 


Est GFR (Non-Af Amer)  65.7  (>60)   01/14/19  09:20    


 


BUN/Creatinine Ratio  28.0  (8-20)  H  01/14/19  09:20    


 


Glucose  152 mg/dL ()  H  01/14/19  09:20    


 


POC Glucose (mg/dL)  141 mg/dL ()  H  12/29/18  18:55    


 


Lactic Acid  1.0 mmol/L (0.5-2.0)   12/29/18  19:40    


 


Uric Acid  7.1 mg/dL (4.4-7.6)   01/08/19  12:59    


 


Calcium  9.1 mg/dL (8.6-10.3)   01/14/19  09:20    


 


Magnesium  2.2 mg/dL (1.9-2.7)   01/02/19  09:58    


 


Total Bilirubin  0.40 mg/dL (0.2-1.0)   12/29/18  19:40    


 


AST  25 U/L (13-39)   12/29/18  19:40    


 


ALT  24 U/L (7-52)   12/29/18  19:40    


 


Alkaline Phosphatase  61 U/L ()   12/29/18  19:40    


 


Total Creatine Kinase  51 U/L ()   12/29/18  19:40    


 


CK-MB (CK-2)  3.0 ng/mL (0.6-6.3)   12/29/18  19:40    


 


Troponin I  0.01 ng/mL (<0.04)   12/30/18  05:13    


 


C-Reactive Protein  158.19 mg/L (<8.01)  H  01/09/19  05:20    


 


B-Natriuretic Peptide  112 pg/mL (<=100)  H  12/29/18  19:40    


 


Total Protein  6.4 g/dL (6.4-8.9)   12/29/18  19:40    


 


Albumin  3.1 g/dL (3.2-5.2)  L  12/29/18  19:40    


 


Globulin  3.3 g/dL (2-4)   12/29/18  19:40    


 


Albumin/Globulin Ratio  0.9  (1-3)  L  12/29/18  19:40    


 


Triglycerides  94 mg/dL  12/30/18  05:13    


 


Cholesterol  122 mg/dL  12/30/18  05:13    


 


LDL Cholesterol  56 mg/dL  12/30/18  05:13    


 


HDL Cholesterol  47.2 mg/dL  12/30/18  05:13    


 


TSH  0.92 mcIU/mL (0.34-5.60)   12/30/18  05:13    


 


Thyroxine (T4)  5.47 g/dL (6.09-12.23)  L  12/29/18  19:40    


 


Urine Color  Yellow   01/07/19  14:00    


 


Urine Appearance  Cloudy   01/07/19  14:00    


 


Urine pH  5.0  (5-9)   01/07/19  14:00    


 


Ur Specific Gravity  1.023  (1.010-1.030)   01/07/19  14:00    


 


Urine Protein  Negative  (Negative)   01/07/19  14:00    


 


Urine Ketones  Negative  (Negative)   01/07/19  14:00    


 


Urine Blood  Negative  (Negative)   01/07/19  14:00    


 


Urine Nitrate  Negative  (Negative)   01/07/19  14:00    


 


Urine Bilirubin  Negative  (Negative)   01/07/19  14:00    


 


Urine Urobilinogen  Negative  (Negative)   01/07/19  14:00    


 


Ur Leukocyte Esterase  Negative  (Negative)   01/07/19  14:00    


 


Urine Glucose  Negative  (Negative)   01/07/19  14:00    














- Objective


Active Medications: 








Acetaminophen (Tylenol Tab*)  650 mg PO Q4H PRN


   PRN Reason: PAIN/FEVER


   Last Admin: 01/12/19 21:20 Dose:  650 mg


Albuterol (Ventolin Hfa Inhaler*)  2 puff INH Q4H PRN


   PRN Reason: SHORTNESS OF BREATH


Aspirin (Aspirin Ec Tab*)  81 mg PO DAILY [facility]


   Last Admin: 01/15/19 08:54 Dose:  81 mg


Atorvastatin Calcium (Lipitor*)  10 mg PO DAILY [facility]


   Last Admin: 01/15/19 08:54 Dose:  10 mg


Calcium Carbonate (Calcium Carbonate Tab*)  1,250 mg PO BID [facility]


   Last Admin: 01/15/19 08:54 Dose:  1,250 mg


Cholecalciferol (Vitamin D Tab*)  1,000 units PO DAILY [facility]


   Last Admin: 01/15/19 08:54 Dose:  1,000 units


Docusate Sodium (Colace Cap*)  100 mg PO BID [facility]


   Last Admin: 01/15/19 08:54 Dose:  Not Given


Heparin Sodium (Porcine) (Heparin Vial(*))  5,000 units SUBCUT Q8HR [facility]


   Last Admin: 01/15/19 14:22 Dose:  Not Given


Loperamide HCl (Imodium Liq*)  2 mg PO DAILY PRN


   PRN Reason: LOOSE STOOLS


Melatonin (Melatonin)  9 mg PO BEDTIME [facility]


   Last Admin: 01/14/19 22:05 Dose:  9 mg


Memantine (Namenda Tab*)  10 mg PO BID [facility]


   Last Admin: 01/15/19 08:54 Dose:  10 mg


Metoprolol Tartrate (Lopressor Tab*)  25 mg PO BID [facility]


   Last Admin: 01/15/19 08:53 Dose:  25 mg


Naproxen (Naprosyn Tab*)  250 mg PO Q12H PRN


   PRN Reason: PAIN


Nystatin (Nystatin Top Powder*)  1 applic TOPICAL TID [facility]


   Last Admin: 01/15/19 14:22 Dose:  Not Given


Senna (Senokot Tab*)  2 tab PO BEDTIME [facility]


   Last Admin: 01/14/19 22:03 Dose:  2 tab


Tiotropium Bromide (Spiriva Cap.Inh*)  1 cap INH DAILY [facility]


   Last Admin: 01/15/19 08:00 Dose:  1 cap








Vital Signs: 


Vital Signs:











Temp Pulse Resp BP Pulse Ox


 


 97.5 F   82   16   124/70   94 


 


 01/15/19 07:39  01/15/19 07:39  01/15/19 09:14  01/15/19 07:39  01/15/19 07:39











Patient Weight: 


 





Weight                           90.9 kg








Intake and Output: 


 Intake & Output











 01/13/19 01/14/19 01/15/19 01/16/19





 06:59 06:59 06:59 06:59


 


Intake Total 340 1380 1110 220


 


Balance 340 1380 1110 220


 


Intake:    


 


  IV Fluids  980  


 


    NS (0.9%)  980  


 


  Oral  220


 


Other:    


 


  Estimated Void Medium Small Large Medium


 


  # Bowel Movements 0 0 1 0


 


  Estimated Stool Amount Medium  Small 


 


  # Voids 0 2 0 1





 





ADLs: Meal  Record                                         Start:  12/29/18 21:

34


Freq:   DAILY@0900,1400,1800                               Status: Active      

  


Protocol:                                                                      

  


 Created      12/29/18 21:34  System  (Rec: 12/29/18 21:34  System  IMG-C73)


 Document     12/30/18 09:00  [id number]  (Rec: 12/30/18 09:42  ZPH0743  TELE-M14)


 Document     12/30/18 14:00  GZE3585  (Rec: 12/30/18 15:11  KDG2617  TELE-M14)


 Document     12/30/18 18:00  JJO2554  (Rec: 12/30/18 18:07  ZMJ7860  TELE-M14)


 Document     12/31/18 09:00  SKQ7456  (Rec: 12/31/18 10:03  PNH7880  TELE-M14)


 Document     12/31/18 14:00  SZI5068  (Rec: 12/31/18 14:02  HYN5989  TELE-M14)


 Document     12/31/18 17:47  NUG3953  (Rec: 12/31/18 17:47  DQU5909  TELE-M15)


 Document     01/01/19 09:00  VJE1559  (Rec: 01/01/19 10:17  UBZ3304  TELE-M15)


 Document     01/01/19 13:31  TSU6005  (Rec: 01/01/19 13:35  ZNG1665  TELE-M15)


 Document     01/01/19 18:00  UEJ4961  (Rec: 01/01/19 21:21  PGC4978  TELE-C11)


 Document     01/02/19 09:00  LRW4523  (Rec: 01/02/19 10:49  LRI3760  TELE-C08)


 Document     01/02/19 13:44  RLK5534  (Rec: 01/02/19 13:44  ZSP7996  TELE-C08)


 Document     01/02/19 17:36  ITK8751  (Rec: 01/02/19 17:36  KMS9601  TELE-C10)


 Document     01/03/19 09:00  YZJ1013  (Rec: 01/03/19 10:38  HEZ3840  MED-C09)


 Document     01/03/19 14:00  UAT5832  (Rec: 01/03/19 15:52  ROI4157  MED-C09)


 Document     01/03/19 18:00  IZW7857  (Rec: 01/03/19 18:39  ESV8783  MED-C16)


 Document     01/04/19 09:00  SLV3082  (Rec: 01/04/19 09:44  ODO0712  MED-M16)


 Document     01/04/19 14:00  LXJ8243  (Rec: 01/04/19 14:51  ZLV0574  MED-C09)


 Document     01/04/19 18:00  IOQ9608  (Rec: 01/04/19 18:19  BZL2239  MED-C12)


 Document     01/05/19 09:00  KHZ8704  (Rec: 01/05/19 09:03  UJX3747  MED-C09)


 Document     01/05/19 13:29  PCH9016  (Rec: 01/05/19 13:30  THM1504  MED-C09)


 Document     01/05/19 18:00  WQM5726  (Rec: 01/05/19 18:11  BXE6179  MED-C11)


 Document     01/06/19 09:00  CAP9877  (Rec: 01/06/19 10:35  EQE0065  MED-C11)


 Document     01/06/19 13:32  PSA7675  (Rec: 01/06/19 13:37  BFY4357  MED-C11)


 Document     01/06/19 18:00  FQT2945  (Rec: 01/06/19 18:28  CBM2447  MED-C14)


 Document     01/07/19 09:00  XSD4653  (Rec: 01/07/19 09:33  UMR8801  MED-C09)


 Document     01/07/19 14:00  OVY4819  (Rec: 01/07/19 14:34  CRN0084  MED-C11)


 Document     01/07/19 23:43  HJI7386  (Rec: 01/07/19 23:44  RSN4290  MED-C07)


 Document     01/08/19 09:00  SIM9804  (Rec: 01/08/19 10:07  DGB1535  MED-C09)


 Document     01/08/19 14:00  GIA5632  (Rec: 01/08/19 14:42  CFG5148  MED-C09)


 Document     01/08/19 18:00  PPO7483  (Rec: 01/08/19 19:37  REG2893  MED-C11)


 Document     01/09/19 08:58  AZW5856  (Rec: 01/09/19 08:59  BOK9562  MED-C09)


 Document     01/09/19 14:00  CUG8417  (Rec: 01/09/19 14:06  GHH8748  MED-C09)


 Document     01/09/19 17:58  FAD3923  (Rec: 01/09/19 17:58  DVP8431  MED-C09)


 Document     01/10/19 09:00  VHN9529  (Rec: 01/10/19 09:05  QEQ3858  MED-C09)


 Document     01/10/19 12:48  FKJ3517  (Rec: 01/10/19 12:48  MSV9911  MED-C05)


 Document     01/10/19 17:35  VJV2016  (Rec: 01/10/19 17:35  MBZ5364  MED-C05)


 Document     01/11/19 09:00  RQJ3643  (Rec: 01/11/19 10:38  WFZ5635  MED-C11)


 Document     01/11/19 14:00  BVR8295  (Rec: 01/11/19 15:13  ABQ2951  MED-C11)


 Document     01/11/19 18:00  OFG2104  (Rec: 01/11/19 23:43  OHF2759  MED-C02)


 Document     01/12/19 09:00  TBA6359  (Rec: 01/12/19 10:05  WPW8827  MED-C11)


 Document     01/12/19 14:00  VLF2364  (Rec: 01/12/19 15:51  KXU5240  MED-C11)


 Document     01/12/19 18:00  KAF8635  (Rec: 01/12/19 18:23  IAP5233  MED-C11)


 Document     01/13/19 09:00  LXL9575  (Rec: 01/13/19 10:32  HKJ8586  MED-C11)


 Document     01/13/19 14:00  LYK3368  (Rec: 01/13/19 16:23  BKL8546  MED-C11)


 Document     01/13/19 18:00  DTP7798  (Rec: 01/13/19 18:55  YVR2428  MED-C11)


 Document     01/14/19 09:00  ISS5732  (Rec: 01/14/19 10:59  VVN6870  MED-C13)


 Document     01/14/19 14:00  VQW3020  (Rec: 01/14/19 14:08  JDM4714  MED-C13)


 Document     01/14/19 18:00  GFF2843  (Rec: 01/14/19 21:59  RHI7127  MED-C07)


 Document     01/15/19 08:51  RGG2979  (Rec: 01/15/19 08:51  PWW3576  MED-C11)


 Document     01/15/19 13:49  HOU4341  (Rec: 01/15/19 13:51  HJF3163  MED-C11)


Intake and Output                                          Start:  12/29/18 18:

52


Freq:                                                      Status: Active      

  


Protocol:                                                                      

  


 Created      12/29/18 18:52  System  (Rec: 12/29/18 18:52  System  EDRM-C09)


Intake and Output                                          Start:  12/29/18 21:

34


Freq:   DAILY@0600,1400,2200                               Status: Active      

  


Protocol:                                                                      

  


 Created      12/29/18 21:34  System  (Rec: 12/29/18 21:34  System  IMG-C73)


 Document     12/29/18 22:00  RQS6416  (Rec: 12/29/18 22:29  NUM2471  TELE-C11)


 Document     12/30/18 05:59  HVB0970  (Rec: 12/30/18 06:02  UBU9839  TELE-C34)


 Document     12/30/18 14:00  ZJI9533  (Rec: 12/30/18 15:11  TCK2989  TELE-M14)


 Document     12/30/18 22:00  SJF3802  (Rec: 12/30/18 22:16  HLR5643  TELE-M14)


 Document     12/31/18 06:00  ZSB1844  (Rec: 12/31/18 06:14  WTO5720  TELE-M14)


 Document     12/31/18 14:00  CIW6636  (Rec: 12/31/18 14:02  GWL6601  TELE-M14)


 Document     01/01/19 05:25  XVC6348  (Rec: 01/01/19 05:27  TFR1139  TELE-M15)


 Document     01/01/19 14:00  WIT7555  (Rec: 01/01/19 14:10  OBR8061  TELE-M15)


 Document     01/01/19 22:00  BVP5678  (Rec: 01/01/19 22:29  PVZ0748  TELE-C11)


 Document     01/02/19 06:00  GYF9095  (Rec: 01/02/19 06:51  GCE8671  TELE-C33)


 Document     01/02/19 10:49  ZDR7461  (Rec: 01/02/19 10:49  NFJ4960  TELE-C08)


 Document     01/02/19 13:44  KWI2587  (Rec: 01/02/19 13:44  PVD7555  TELE-C08)


 Document     01/02/19 21:42  PZF6868  (Rec: 01/02/19 21:42  YVA1833  TELE-C10)


 Document     01/03/19 06:00  IGZ7699  (Rec: 01/03/19 06:18  OFC5856  MED-C14)


 Document     01/03/19 13:50  KHY4006  (Rec: 01/03/19 13:53  BAR1770  MED-C11)


 Document     01/03/19 22:00  RJZ4403  (Rec: 01/03/19 22:50  KGS1239  MED-C16)


 Document     01/04/19 04:59  PIS7540  (Rec: 01/04/19 05:19  LMQ4617  MED-C11)


 Document     01/04/19 14:00  FKQ6734  (Rec: 01/04/19 14:51  QSI7282  MED-C09)


 Document     01/04/19 22:00  PUV3879  (Rec: 01/04/19 22:43  FCQ3853  MED-C09)


 Document     01/05/19 06:00  DLY0644  (Rec: 01/05/19 07:02  ZTZ4916  MED-C09)


 Document     01/05/19 13:29  VNI1269  (Rec: 01/05/19 13:30  KIQ8593  MED-C09)


 Document     01/05/19 22:00  TBT8129  (Rec: 01/05/19 22:22  WCF9721  MED-C09)


 Document     01/06/19 06:00  AXF5899  (Rec: 01/06/19 06:40  TNS1078  MED-C09)


 Document     01/06/19 13:32  EQQ9246  (Rec: 01/06/19 13:37  NWV5709  MED-C11)


 Document     01/06/19 21:56  EQJ5409  (Rec: 01/06/19 21:57  GNX1968  MED-C13)


 Document     01/07/19 04:49  GKO6908  (Rec: 01/07/19 04:49  WVQ5227  MED-C13)


 Document     01/07/19 14:00  OZA9348  (Rec: 01/07/19 14:34  DUE8900  MED-C11)


 Document     01/07/19 22:00  TZH3012  (Rec: 01/08/19 02:43  BEL0073  MED-M11)


 Document     01/08/19 06:00  AAV2620  (Rec: 01/08/19 06:30  JDR2412  MED-C13)


 Document     01/08/19 14:00  VQA8086  (Rec: 01/08/19 14:42  BXE2963  MED-C09)


 Document     01/08/19 22:00  CRX6358  (Rec: 01/08/19 23:55  AAO9850  MED-C13)


 Document     01/09/19 06:00  NDR0785  (Rec: 01/09/19 06:44  UBY3506  MED-C25)


 Document     01/09/19 06:00  YAW3026  (Rec: 01/09/19 06:26  HNE6828  MED-C13)


 Document     01/09/19 14:00  YVZ8426  (Rec: 01/09/19 14:06  PXH4725  MED-C09)


 Document     01/09/19 22:00  VRX3846  (Rec: 01/10/19 00:45  EZQ5931  MED-C11)


 Document     01/10/19 05:33  GRD2860  (Rec: 01/10/19 05:34  DTB6123  MED-C11)


 Document     01/10/19 12:21  JAA1686  (Rec: 01/10/19 12:21  IEQ9401  MED-C05)


 Document     01/10/19 21:55  FPE1315  (Rec: 01/10/19 21:56  YGT5166  MED-C16)


 Document     01/11/19 05:40  UBA1596  (Rec: 01/11/19 05:42  AXI7840  MED-C16)


 Document     01/11/19 14:00  UJS2166  (Rec: 01/11/19 15:13  UKN9895  MED-C11)


 Document     01/11/19 22:00  WZM8487  (Rec: 01/11/19 23:46  ADL4604  MED-C02)


 Document     01/12/19 06:00  HJX4727  (Rec: 01/12/19 06:21  PEV6465  MED-C02)


 Document     01/12/19 14:00  DED6921  (Rec: 01/12/19 15:51  EVQ8513  MED-C11)


 Document     01/12/19 22:00  QTL7535  (Rec: 01/12/19 22:46  WTD7253  MED-C05)


 Document     01/13/19 05:25  YGL0890  (Rec: 01/13/19 05:26  IXP7618  MED-C05)


 Document     01/13/19 14:00  QEH3252  (Rec: 01/13/19 16:23  UBA0998  MED-C11)


 Document     01/13/19 22:00  SFU8508  (Rec: 01/13/19 22:22  NDL0066  MED-C11)


 Document     01/14/19 05:13  UOT2986  (Rec: 01/14/19 05:14  MCC5616  MED-C11)


 Document     01/14/19 14:00  QWX8278  (Rec: 01/14/19 14:09  XYR7425  MED-C13)


 Document     01/14/19 22:00  ZOE9587  (Rec: 01/14/19 22:13  IPG4585  MED-C07)


 Document     01/15/19 05:20  YIB9433  (Rec: 01/15/19 05:21  MAA9093  MED-C05)


 Document     01/15/19 13:49  VKE8440  (Rec: 01/15/19 13:51  DRG1119  MED-C11)








Eyes: No Scleral Icterus, PERRLA


Ears/Nose/Mouth/Throat: NL Teeth, Lips, Gums, Mucous Membranes Moist


Neck: NL Appearance and Movements; NL JVP, Trachea Midline


Cardiovascular: NL Sounds; No Murmurs; No JVD


Respiratory: Clear to Auscultation - oriented to self only


Abdominal: NL Sounds; No Tenderness; No Distention


Extremities: No Edema


Neurological: NL Muscle Strength and Tone





- Assessment


Assessment: 





84 yo male with advanced dementia and syncopal event





- Plan


Consult Plan (MU): Hospice


Plan: 





Long discussion with step daughter who completed the MOLST form no 

interventions and preferring comfort care. She also was interested in hospice. 

I think he should qualify with advanced dementia diagnosis. She doesn't want to 

continue with the syncopal work up. She does want him to be fed. He can't self 

feed. Her preference is the hospice residence but is also interested in hospice 

at SNF. Left message for Dr Montanez (PCP) about hospice referral. Step daughter 

has noticed a decline over the last several weeks. He is doing less ADLs. Case 

manager to send referral to Hospice.





- Time On Unit


Date of Evaluation: 01/15/19


Hospice Consult Time in: 02:00


Hospice Consult Time Out: 03:30


Hospice Consult Time Total: 90


> 50% of Time Spend In Counseling or Coordinating Care: Yes

## 2019-01-16 VITALS — SYSTOLIC BLOOD PRESSURE: 141 MMHG | DIASTOLIC BLOOD PRESSURE: 67 MMHG

## 2019-01-16 RX ADMIN — DOCUSATE SODIUM SCH: 100 CAPSULE, LIQUID FILLED ORAL at 10:18

## 2019-01-16 RX ADMIN — ASPIRIN SCH MG: 81 TABLET, COATED ORAL at 09:52

## 2019-01-16 RX ADMIN — DOCUSATE SODIUM SCH MG: 100 CAPSULE, LIQUID FILLED ORAL at 09:56

## 2019-01-16 RX ADMIN — Medication SCH UNITS: at 09:56

## 2019-01-16 RX ADMIN — HEPARIN SODIUM SCH: 5000 INJECTION INTRAVENOUS; SUBCUTANEOUS at 05:19

## 2019-01-16 RX ADMIN — NYSTATIN SCH APPLIC: 100000 POWDER TOPICAL at 10:18

## 2019-01-16 RX ADMIN — METOPROLOL TARTRATE SCH MG: 25 TABLET, FILM COATED ORAL at 09:52

## 2019-01-16 RX ADMIN — TIOTROPIUM BROMIDE SCH CAP: 18 CAPSULE ORAL; RESPIRATORY (INHALATION) at 07:17

## 2019-01-16 RX ADMIN — CALCIUM SCH MG: 500 TABLET ORAL at 09:56

## 2019-01-16 RX ADMIN — ATORVASTATIN CALCIUM SCH MG: 10 TABLET, FILM COATED ORAL at 09:52

## 2019-01-16 RX ADMIN — MEMANTINE HYDROCHLORIDE SCH MG: 10 TABLET ORAL at 09:52

## 2019-01-16 NOTE — TRS
DATE OF ADMISSION:  12/30/2018.

 

DATE OF TRANSFER:  01/16/2019.

 

HISTORY OF PRESENT ILLNESS AND HOSPITAL COURSE:  This 85-year-old man was 
admitted from the skilled nursing facility because of unresponsive episode.  
His systolic blood pressure was 70, his heart rate was 40.  He an elevated D-
dimer, but the CTA did not show any pulmonary embolus.  It was felt after 
evaluation here that he had a vasovagal episode.  He has had some behavioral 
disturbance and agitation.  He was given some antipsychotic medications.  
Risperidone made him very sleepy and this was discontinued.  He is currently 
being maintained without any antipsychotic medications.  His Donepezil was 
stopped.  Memantine is continued for the time being.

 

He has a swallowing evaluation which he passed.

 

The family is interested in hospice care.  Calorie counts might be considered.  
I would also consider discussing with the healthcare proxy discontinuing his 
Simvastatin and Memantine.

 

FINAL DIAGNOSIS:  Dementia.

 

DISCHARGE MEDICATIONS:

 

1.  Albuterol inhaler two puffs every 4 hours prn.

2.  Nystatin topical powder to affected areas t.i.d.

3.  Senna two at bedtime.

4.  Tiotropium one daily.

5.  Acetaminophen 650 mg every 4 hours prn.

6.  Metoprolol Tartrate 25 mg b.i.d.

7.  Simvastatin 20 mg daily.

8.  Vitamin D 1,000 units daily.

9.  Aspirin 81 mg daily.

10. Umeclidinium Vilanterol 62.5 one inhalation daily.

 

CONDITION ON DISCHARGE:  Stable.

 

DISPOSITION ON DISCHARGE:  Transfer to [facility].

 

 [id number] #: [id number]

PETRA

## 2019-01-16 NOTE — PN
Subjective


Date of Service: 01/16/19


Interval History: 





Patient likely not able to make his needs known.  He offers no c/o; however his 

communication skills are poor. 


Family History: Unchanged from Admission


Social History: Unchanged from Admission


Past Medical History: Unchanged from Admission





Objective


Active Medications: 








Acetaminophen (Tylenol Tab*)  650 mg PO Q4H PRN


   PRN Reason: PAIN/FEVER


   Last Admin: 01/12/19 21:20 Dose:  650 mg


Albuterol (Ventolin Hfa Inhaler*)  2 puff INH Q4H PRN


   PRN Reason: SHORTNESS OF BREATH


Aspirin (Aspirin Ec Tab*)  81 mg PO DAILY [facility]


   Last Admin: 01/15/19 08:54 Dose:  81 mg


Atorvastatin Calcium (Lipitor*)  10 mg PO DAILY [facility]


   Last Admin: 01/15/19 08:54 Dose:  10 mg


Calcium Carbonate (Calcium Carbonate Tab*)  1,250 mg PO BID [facility]


   Last Admin: 01/15/19 20:34 Dose:  1,250 mg


Cholecalciferol (Vitamin D Tab*)  1,000 units PO DAILY [facility]


   Last Admin: 01/15/19 08:54 Dose:  1,000 units


Docusate Sodium (Colace Cap*)  100 mg PO BID [facility]


   Last Admin: 01/15/19 20:29 Dose:  Not Given


Heparin Sodium (Porcine) (Heparin Vial(*))  5,000 units SUBCUT Q8HR [facility]


   Last Admin: 01/16/19 05:19 Dose:  Not Given


Loperamide HCl (Imodium Liq*)  2 mg PO DAILY PRN


   PRN Reason: LOOSE STOOLS


Memantine (Namenda Tab*)  10 mg PO BID [facility]


   Last Admin: 01/15/19 20:34 Dose:  10 mg


Metoprolol Tartrate (Lopressor Tab*)  25 mg PO BID [facility]


   Last Admin: 01/15/19 20:34 Dose:  25 mg


Naproxen (Naprosyn Tab*)  250 mg PO Q12H PRN


   PRN Reason: PAIN


Nystatin (Nystatin Top Powder*)  1 applic TOPICAL TID [facility]


   Last Admin: 01/15/19 20:29 Dose:  1 applic


Senna (Senokot Tab*)  2 tab PO BEDTIME [facility]


   Last Admin: 01/15/19 20:39 Dose:  2 tab


Tiotropium Bromide (Spiriva Cap.Inh*)  1 cap INH DAILY [facility]


   Last Admin: 01/16/19 07:17 Dose:  1 cap








 Vital Signs - 8 hr











  01/16/19 01/16/19





  07:25 07:44


 


Temperature  98.0 F


 


Pulse Rate 80 


 


Respiratory  18





Rate  


 


Blood Pressure  141/67





(mmHg)  


 


O2 Sat by Pulse 96 





Oximetry  











Oxygen Devices in Use Now: None


Appearance: Alert, partly up in bed.  Neutral affect.  Looks comfortable.


Eyes: No Scleral Icterus


Neurological: - - Sociable.  Unable to respond to any questions appropriately, 

no coherent sentences.  No tremor. 


Result Diagrams: 


 01/09/19 05:20





 01/14/19 09:20


Additional Lab and Data: 





  


Microbiology and Other Data: 


 Microbiology











 12/30/18 05:20 Nasal Screen MRSA (PCR) - Final





 Nasal    Mrsa Detected











EKG Data: 





ns no acute st t change 





Assess/Plan/Problems-Billing


Assessment: 





Mr. Denis is 85 yr old male with a history significant for mod dementia, 

prostate cancer, and HTN. Patient was in his normal state of health when he had 

a prolonged episode of syncope with bradycardia and hypotension with an episode 

of diarrhea. Patient was admitted for evaluation of syncope 








- Patient Problems


(1) Moderate dementia with behavioral disturbance


Current Visit: Yes   Status: Chronic   Code(s): F03.91 - UNSPECIFIED DEMENTIA 

WITH BEHAVIORAL DISTURBANCE   SNOMED Code(s): 93829971


   Comment: It was suspected that the pt developed delirium at admission 

probably related to pain , that improved dramatically after R wrist mercedes was 

treated.


- started on Risperidone PO trial but increased drowsiness and discontinued.  

Calorie counts ordered. 





   





(2) HTN (hypertension)


Current Visit: Yes   Status: Chronic   Code(s): I10 - ESSENTIAL (PRIMARY) 

HYPERTENSION   SNOMED Code(s): 47296993


   Comment: 


- Controlled


- Continue metoprolol


-d/c'd Norvasc for SBP in low 100's   


Status and Disposition: 





. Case management is following. Medically ready for d/c but awaiting STR 

disposition

## 2019-01-16 NOTE — PN
Progress Note





- Progress Note


Date of Service: 01/16/19


Note: 





Time spent on discharge including exam of patient, discussion with CM, nurse, 

review of EMR and preparation of discharge documents 45 minutes.
